# Patient Record
Sex: FEMALE | Race: WHITE | NOT HISPANIC OR LATINO | Employment: FULL TIME | URBAN - METROPOLITAN AREA
[De-identification: names, ages, dates, MRNs, and addresses within clinical notes are randomized per-mention and may not be internally consistent; named-entity substitution may affect disease eponyms.]

---

## 2020-08-11 ENCOUNTER — TRANSCRIBE ORDERS (OUTPATIENT)
Dept: ADMINISTRATIVE | Facility: HOSPITAL | Age: 35
End: 2020-08-11

## 2020-08-11 DIAGNOSIS — Z31.41 FERTILITY TESTING: Primary | ICD-10-CM

## 2021-01-27 ENCOUNTER — OFFICE VISIT (OUTPATIENT)
Dept: URGENT CARE | Facility: CLINIC | Age: 36
End: 2021-01-27
Payer: COMMERCIAL

## 2021-01-27 VITALS
OXYGEN SATURATION: 98 % | HEART RATE: 85 BPM | TEMPERATURE: 98 F | HEIGHT: 67 IN | BODY MASS INDEX: 33.59 KG/M2 | WEIGHT: 214 LBS

## 2021-01-27 DIAGNOSIS — R53.83 FATIGUE, UNSPECIFIED TYPE: Primary | ICD-10-CM

## 2021-01-27 DIAGNOSIS — Z20.822 CLOSE EXPOSURE TO COVID-19 VIRUS: ICD-10-CM

## 2021-01-27 PROCEDURE — U0003 INFECTIOUS AGENT DETECTION BY NUCLEIC ACID (DNA OR RNA); SEVERE ACUTE RESPIRATORY SYNDROME CORONAVIRUS 2 (SARS-COV-2) (CORONAVIRUS DISEASE [COVID-19]), AMPLIFIED PROBE TECHNIQUE, MAKING USE OF HIGH THROUGHPUT TECHNOLOGIES AS DESCRIBED BY CMS-2020-01-R: HCPCS | Performed by: PHYSICIAN ASSISTANT

## 2021-01-27 PROCEDURE — G0382 LEV 3 HOSP TYPE B ED VISIT: HCPCS | Performed by: PHYSICIAN ASSISTANT

## 2021-01-27 PROCEDURE — U0005 INFEC AGEN DETEC AMPLI PROBE: HCPCS | Performed by: PHYSICIAN ASSISTANT

## 2021-01-27 RX ORDER — DIPHENHYDRAMINE HYDROCHLORIDE 25 MG/1
25 CAPSULE ORAL DAILY
COMMUNITY

## 2021-01-27 RX ORDER — SERTRALINE HYDROCHLORIDE 100 MG/1
125 TABLET, FILM COATED ORAL DAILY
COMMUNITY

## 2021-01-27 RX ORDER — ASPIRIN 81 MG/1
162 TABLET, CHEWABLE ORAL DAILY
COMMUNITY
End: 2021-05-07 | Stop reason: HOSPADM

## 2021-01-27 RX ORDER — OMEGA-3S/DHA/EPA/FISH OIL/D3 300MG-1000
CAPSULE ORAL DAILY
COMMUNITY

## 2021-01-27 NOTE — PATIENT INSTRUCTIONS

## 2021-01-27 NOTE — PROGRESS NOTES
[email or web address]        NAME: Lupe Louis is a 37 y o  female  : 1985    MRN: [id number]  DATE: 2021  TIME: 1:28 PM    Assessment and Plan   Fatigue, unspecified type [R53 83]  1  Fatigue, unspecified type  Novel Coronavirus (Covid-19),PCR [facility] - Office Collection   2  Close exposure to COVID-19 virus  Novel Coronavirus (Covid-19),PCR [facility] - Office Collection         Patient Instructions       Follow up with PCP in 3-5 days  Proceed to  ER if symptoms worsen  Chief Complaint     Chief Complaint   Patient presents with    Fatigue    COVID-19      tested postive 2 days ago          History of Present Illness       Patient presents with fatigue, however, she is pregnant  Her  tested positive for COVID-19 and she is here for testing  Review of Systems   Review of Systems   Constitutional: Positive for fatigue  Negative for chills and fever  Respiratory: Positive for cough  Negative for shortness of breath  Gastrointestinal: Negative for diarrhea, nausea and vomiting  Musculoskeletal: Negative for myalgias  Neurological: Positive for headaches  Negative for dizziness  Current Medications       Current Outpatient Medications:     aspirin 81 mg chewable tablet, Chew 162 mg daily, Disp: , Rfl:     cholecalciferol (VITAMIN D3) 400 units tablet, Take by mouth daily, Disp: , Rfl:     sertraline (ZOLOFT) 100 mg tablet, Take 100 mg by mouth daily, Disp: , Rfl:     Pyridoxine HCl (vitamin B-6) 25 MG tablet, Take 25 mg by mouth daily, Disp: , Rfl:     Current Allergies     Allergies as of 2021    (No Known Allergies)            The following portions of the patient's history were reviewed and updated as appropriate: allergies, current medications, past family history, past medical history, past social history, past surgical history and problem list      History reviewed  No pertinent past medical history  History reviewed   No pertinent surgical history  No family history on file  Medications have been verified  Objective   Pulse 85   Temp 98 °F (36 7 °C)   Ht 5' 7" (1 702 m)   Wt 97 1 kg (214 lb)   SpO2 98%   BMI 33 52 kg/m²   No LMP recorded  Patient is pregnant  Physical Exam     Physical Exam  Vitals signs and nursing note reviewed  Constitutional:       Appearance: Normal appearance  HENT:      Head: Normocephalic and atraumatic  Cardiovascular:      Rate and Rhythm: Normal rate and regular rhythm  Heart sounds: Normal heart sounds  Pulmonary:      Effort: Pulmonary effort is normal       Breath sounds: Normal breath sounds  Skin:     General: Skin is warm  Neurological:      Mental Status: She is alert

## 2021-01-28 LAB — SARS-COV-2 RNA RESP QL NAA+PROBE: POSITIVE

## 2021-02-05 ENCOUNTER — TELEMEDICINE (OUTPATIENT)
Dept: PERINATAL CARE | Facility: OTHER | Age: 36
End: 2021-02-05
Payer: COMMERCIAL

## 2021-02-05 ENCOUNTER — TRANSCRIBE ORDERS (OUTPATIENT)
Dept: PERINATAL CARE | Facility: CLINIC | Age: 36
End: 2021-02-05

## 2021-02-05 DIAGNOSIS — O98.512 COVID-19 AFFECTING PREGNANCY IN SECOND TRIMESTER: Primary | ICD-10-CM

## 2021-02-05 DIAGNOSIS — Z3A.26 26 WEEKS GESTATION OF PREGNANCY: ICD-10-CM

## 2021-02-05 DIAGNOSIS — O09.899 SUPERVISION OF OTHER HIGH RISK PREGNANCIES, UNSPECIFIED TRIMESTER: ICD-10-CM

## 2021-02-05 DIAGNOSIS — O09.899 SUPERVISION OF OTHER HIGH RISK PREGNANCIES, UNSPECIFIED TRIMESTER: Primary | ICD-10-CM

## 2021-02-05 DIAGNOSIS — U07.1 COVID-19 AFFECTING PREGNANCY IN SECOND TRIMESTER: Primary | ICD-10-CM

## 2021-02-05 PROCEDURE — 99204 OFFICE O/P NEW MOD 45 MIN: CPT | Performed by: OBSTETRICS & GYNECOLOGY

## 2021-02-05 NOTE — PROGRESS NOTES
Virtual Regular Visit      Assessment/Plan:    Problem List Items Addressed This Visit        Other    COVID-19 affecting pregnancy in second trimester - Primary     COVID-19 in Pregnancy Telemedicine Consult:    Current gestational age: 34w1d    Narrative of symptoms and time course:  Note her father currently intubated in ICU in hospital in [address] - offered emotional support at this difficult time   developed anosmia; she called her OB and was sent for testing; her test collected 21 resulted as positive   developed a wet cough (foamy) and fevers (3 days), maintained SaO2, he is now fever free x2 days, feels much better  Alexandra: no fevers (occ "hot flashes" but no fevers), exhaustion, chest congestion; no mucus, some discomfort on deep inspiration, better though with Vicks vaporub, occasionally takes Tylenol for pain  Poor appetite, not thirsty  It is hard to tell when symptoms developed, probably around 21  Any symptoms today warranting ED-referral? no    Checklist for discontinuation of transmission-based precautions:  1  Has it been 10 days since symptoms first appeared?  no  2  Has it been at least 24h since last fever (off antipyretics)? No fevers  3  Are symptoms improving? yes    Therefore, tentative date of end of quarantine/isolation: 10 days from 21 is 21 return to in-person care  Need to reschedule any ultrasounds?  unclear; I do not have OB records to review; however would advise a third trimester growth assessment  General information on SARS-CoV-2 infection in pregnancy reviewed:  1  The virus is novel and clinical course in pregnancy outcomes are incompletely known  2  There are no known associated fetal malformations; detailed evaluation of fetal anatomy is recommended if not already done  3  Recent SHANTELL publications () suggest no increased risk of  birth or stillbirth    There is however theoretical increased risk of higher rates of low birth weight,  delivery, and  intensive care unit (NICU) admission  4  Third trimester fetal growth assessment is advised  5  The majority of mothers infected experience a mild clinical course  Symptoms often include cough, fever, congestion, ageusia, anosmia, diarrhea, congestion, rhinorrhea and in severe cases may also include pneumonia and respiratory failure  There is potential for worsening symptoms on days 5-10 of infection, and chest pain and/or dyspnea in particular are concerning for worsening disease  6  Recent MMWR data (20) demonstrates an increased risk of severe illness (ICU admission, invasive ventilation, ECMO, and death)  7  Vertical transmission of COVID-19 may be possible, but appears infrequent   infection of the  is also possible  We reviewed option for [facility] ASPIRE registry and she was referred to website: [email or web address]  [facility] edu/    Any further COVID-related MFM followup needed? no       At the conclusion of today's encounter, all questions were answered to her satisfaction  The total time spent on this new patient on the encounter date was 47 minutes  This time included previsit service time of  5 minutes, face-to-face service time of  37 minutes, and post-service time of 5 minutes  This time included preparing to see the patient, obtaining and/or reviewing separately obtained history from her, performing a medically appropriate examination and/or evaluation, counseling and educating her; documenting clinical information in the electronic or other health record  Thank you very much for this kind referral and please do not hesitate to contact me with any further questions or concerns                       Other Visit Diagnoses     Supervision of other high risk pregnancies, unspecified trimester        26 weeks gestation of pregnancy               Reason for visit is   Chief Complaint   Patient presents with   [employer] Virtual Regular Visit        Encounter provider Efren Rodas MD    Provider located at [address] [address]      Recent Visits  No visits were found meeting these conditions  Showing recent visits within past 7 days and meeting all other requirements     Today's Visits  Date Type Provider Dept   21 Telemedicine Efren Rodas MD Dorothy Langston   Showing today's visits and meeting all other requirements     Future Appointments  No visits were found meeting these conditions  Showing future appointments within next 150 days and meeting all other requirements        The patient was identified by name and date of birth  Shola Hernandez was informed that this is a telemedicine visit and that the visit is being conducted through [employer] and patient was informed that this is a secure, HIPAA-compliant platform  She agrees to proceed     My office door was closed  No one else was in the room  She acknowledged consent and understanding of privacy and security of the video platform  The patient has agreed to participate and understands they can discontinue the visit at any time  Patient is aware this is a billable service  Subjective  Shola Hernandez is a 37 y o  female  [facility] patient who is currently COVID-positive  HPI     History reviewed  No pertinent past medical history  Past Surgical History:   Procedure Laterality Date    WISDOM TOOTH EXTRACTION         Current Outpatient Medications   Medication Sig Dispense Refill    aspirin 81 mg chewable tablet Chew 162 mg daily      cholecalciferol (VITAMIN D3) 400 units tablet Take by mouth daily      Pyridoxine HCl (vitamin B-6) 25 MG tablet Take 25 mg by mouth daily      sertraline (ZOLOFT) 100 mg tablet Take 125 mg by mouth daily        No current facility-administered medications for this visit           No Known Allergies    Historical Information   OB History    Para Term  AB Living   1             SAB TAB Ectopic Multiple Live Births                  # Outcome Date GA Lbr Jerman/2nd Weight Sex Delivery Anes PTL Lv   1 Current              Gynecologic history: No LMP recorded  Patient is pregnant  Past Medical History Pertinent Negatives:   Diagnosis Date Noted    Deep vein thrombosis ([facility] ) 2021    Gestational diabetes 2021    Hypertension 2021    Pulmonary embolism ([facility] ) 2021      Past Surgical History Pertinent Negatives:   Procedure Date Noted     SECTION 2021      Social History   Social History     Tobacco Use    Smoking status: Never Smoker    Smokeless tobacco: Never Used   Substance Use Topics    Alcohol use: Not Currently    Drug use: Never      Occupation: [employer]  (work from home)  Spouse/Partner Name: Ashleigh Ramsey; Age 29  Family History   Problem Relation Age of Onset    Hypertension Mother        Review of Systems   Constitutional: Positive for appetite change and fatigue  Negative for chills and fever  HENT: Negative for congestion  Respiratory: Negative for cough  Cardiovascular: Negative for chest pain, palpitations and leg swelling  Genitourinary: Negative for vaginal bleeding  No LOF, no contractions; normal fetal movement  Expecting a boy  Musculoskeletal:        Having hard time comfortably wearing her bra also sleeping on left side; had a sharp left sided pain earlier today which resolved  Video Exam    Home SaO2: 96%, Pulse 103  Physical Exam  Constitutional:       General: She is not in acute distress  Appearance: Normal appearance  She is not ill-appearing  HENT:      Head: Normocephalic  Nose: No congestion  Eyes:      Extraocular Movements: Extraocular movements intact        Comments: Bilateral proptosis vs exophthalmos   Pulmonary:      Effort: Pulmonary effort is normal  No respiratory distress  Abdominal:      Comments: Gravid abdomen   Neurological:      Mental Status: She is alert and oriented to person, place, and time  VIRTUAL VISIT DISCLAIMER    Helen Terri acknowledges that she has consented to an online visit or consultation  She understands that the online visit is based solely on information provided by her, and that, in the absence of a face-to-face physical evaluation by the physician, the diagnosis she receives is both limited and provisional in terms of accuracy and completeness  This is not intended to replace a full medical face-to-face evaluation by the physician  Helen Terri understands and accepts these terms

## 2021-02-05 NOTE — LETTER
February 5, 2021     Prabhakar Cardenas DO  [address] [facility] [place]    Patient: Kajal Turner   YOB: 1985   Date of Visit: 2/5/2021       Dear Dr Yuliya Mei: Thank you for referring Kajal Turner to me for evaluation  Below are my notes for this consultation  We did a consult for her COVID  Would advise TFTs (her eyes appear proptotic) though she denies any history of thyroid conditions  If you have questions, please do not hesitate to call me  Sincerely,        Tim Burns MD        CC: No Recipients  Tim Burns MD  2/5/2021  6:15 PM  Sign when Signing Visit    Virtual Regular Visit      Assessment/Plan:    Problem List Items Addressed This Visit        Other    COVID-19 affecting pregnancy in second trimester - Primary     COVID-19 in Pregnancy Telemedicine Consult:    Current gestational age: 34w1d    Narrative of symptoms and time course:  Note her father currently intubated in ICU in hospital in [address] [place] [address] - offered emotional support at this difficult time   developed anosmia; she called her OB and was sent for testing; her test collected 1/27/21 resulted as positive   developed a wet cough (foamy) and fevers (3 days), maintained SaO2, he is now fever free x2 days, feels much better  Alexandra: no fevers (occ "hot flashes" but no fevers), exhaustion, chest congestion; no mucus, some discomfort on deep inspiration, better though with Vicks vaporub, occasionally takes Tylenol for pain  Poor appetite, not thirsty  It is hard to tell when symptoms developed, probably around 2/1/21  Any symptoms today warranting ED-referral? no    Checklist for discontinuation of transmission-based precautions:  1  Has it been 10 days since symptoms first appeared?  no  2  Has it been at least 24h since last fever (off antipyretics)? No fevers  3  Are symptoms improving?      yes    Therefore, tentative date of end of quarantine/isolation: 10 days from 21 is 21 return to in-person care  Need to reschedule any ultrasounds?  unclear; I do not have OB records to review; however would advise a third trimester growth assessment  General information on SARS-CoV-2 infection in pregnancy reviewed:  1  The virus is novel and clinical course in pregnancy outcomes are incompletely known  2  There are no known associated fetal malformations; detailed evaluation of fetal anatomy is recommended if not already done  3  Recent SHANTELL publications () suggest no increased risk of  birth or stillbirth  There is however theoretical increased risk of higher rates of low birth weight,  delivery, and  intensive care unit (NICU) admission  4  Third trimester fetal growth assessment is advised  5  The majority of mothers infected experience a mild clinical course  Symptoms often include cough, fever, congestion, ageusia, anosmia, diarrhea, congestion, rhinorrhea and in severe cases may also include pneumonia and respiratory failure  There is potential for worsening symptoms on days 5-10 of infection, and chest pain and/or dyspnea in particular are concerning for worsening disease  6  Recent MMWR data (20) demonstrates an increased risk of severe illness (ICU admission, invasive ventilation, ECMO, and death)  7  Vertical transmission of COVID-19 may be possible, but appears infrequent   infection of the  is also possible  We reviewed option for [facility] ASPIRE registry and she was referred to website: [email or web address]  [facility] edu/    Any further COVID-related MFM followup needed? no       At the conclusion of today's encounter, all questions were answered to her satisfaction  The total time spent on this new patient on the encounter date was 47 minutes  This time included previsit service time of  5 minutes, face-to-face service time of  37 minutes, and post-service time of 5 minutes    This time included preparing to see the patient, obtaining and/or reviewing separately obtained history from her, performing a medically appropriate examination and/or evaluation, counseling and educating her; documenting clinical information in the electronic or other health record  Thank you very much for this kind referral and please do not hesitate to contact me with any further questions or concerns  Other Visit Diagnoses     Supervision of other high risk pregnancies, unspecified trimester        26 weeks gestation of pregnancy               Reason for visit is   Chief Complaint   Patient presents with    Virtual Regular Visit        Encounter provider Zina Wolfe MD    Provider located at [address] [id number]      Recent Visits  No visits were found meeting these conditions  Showing recent visits within past 7 days and meeting all other requirements     Today's Visits  Date Type Provider Dept   21 Telemedicine Zina Wolfe MD [facility]   Showing today's visits and meeting all other requirements     Future Appointments  No visits were found meeting these conditions  Showing future appointments within next 150 days and meeting all other requirements        The patient was identified by name and date of birth  Marjorie Awad was informed that this is a telemedicine visit and that the visit is being conducted through [employer] and patient was informed that this is a secure, HIPAA-compliant platform  She agrees to proceed     My office door was closed  No one else was in the room  She acknowledged consent and understanding of privacy and security of the video platform  The patient has agreed to participate and understands they can discontinue the visit at any time  Patient is aware this is a billable service       Subjective  Marjorie Awad is a 37 y o  female  [facility] patient who is currently COVID-positive  HPI     History reviewed  No pertinent past medical history  Past Surgical History:   Procedure Laterality Date    WISDOM TOOTH EXTRACTION         Current Outpatient Medications   Medication Sig Dispense Refill    aspirin 81 mg chewable tablet Chew 162 mg daily      cholecalciferol (VITAMIN D3) 400 units tablet Take by mouth daily      Pyridoxine HCl (vitamin B-6) 25 MG tablet Take 25 mg by mouth daily      sertraline (ZOLOFT) 100 mg tablet Take 125 mg by mouth daily        No current facility-administered medications for this visit  No Known Allergies    Historical Information   OB History    Para Term  AB Living   1             SAB TAB Ectopic Multiple Live Births                  # Outcome Date GA Lbr Jerman/2nd Weight Sex Delivery Anes PTL Lv   1 Current              Gynecologic history: No LMP recorded  Patient is pregnant  Past Medical History Pertinent Negatives:   Diagnosis Date Noted    Deep vein thrombosis ([facility] ) 2021    Gestational diabetes 2021    Hypertension 2021    Pulmonary embolism ([facility] ) 2021      Past Surgical History Pertinent Negatives:   Procedure Date Noted     SECTION 2021      Social History   Social History     Tobacco Use    Smoking status: Never Smoker    Smokeless tobacco: Never Used   Substance Use Topics    Alcohol use: Not Currently    Drug use: Never      Occupation: [employer]  (work from home)  Spouse/Partner Name: Genesis العلي; Age 29  Family History   Problem Relation Age of Onset    Hypertension Mother        Review of Systems   Constitutional: Positive for appetite change and fatigue  Negative for chills and fever  HENT: Negative for congestion  Respiratory: Negative for cough  Cardiovascular: Negative for chest pain, palpitations and leg swelling  Genitourinary: Negative for vaginal bleeding          No LOF, no contractions; normal fetal movement  Expecting a boy  Musculoskeletal:        Having hard time comfortably wearing her bra also sleeping on left side; had a sharp left sided pain earlier today which resolved  Video Exam    Home SaO2: 96%, Pulse 103  Physical Exam  Constitutional:       General: She is not in acute distress  Appearance: Normal appearance  She is not ill-appearing  HENT:      Head: Normocephalic  Nose: No congestion  Eyes:      Extraocular Movements: Extraocular movements intact  Comments: Bilateral proptosis vs exophthalmos   Pulmonary:      Effort: Pulmonary effort is normal  No respiratory distress  Abdominal:      Comments: Gravid abdomen   Neurological:      Mental Status: She is alert and oriented to person, place, and time  VIRTUAL VISIT DISCLAIMER    Bayronravin Boom acknowledges that she has consented to an online visit or consultation  She understands that the online visit is based solely on information provided by her, and that, in the absence of a face-to-face physical evaluation by the physician, the diagnosis she receives is both limited and provisional in terms of accuracy and completeness  This is not intended to replace a full medical face-to-face evaluation by the physician  Rashmi Nur understands and accepts these terms

## 2021-02-05 NOTE — ASSESSMENT & PLAN NOTE
Addended by: WANDA VAUGHN on: 6/22/2017 09:25 AM     Modules accepted: Orders     COVID-19 in Pregnancy Telemedicine Consult:    Current gestational age: 34w1d    Narrative of symptoms and time course:  Note her father currently intubated in ICU in hospital in [address] - offered emotional support at this difficult time   developed anosmia; she called her OB and was sent for testing; her test collected 21 resulted as positive   developed a wet cough (foamy) and fevers (3 days), maintained SaO2, he is now fever free x2 days, feels much better  Alexandra: no fevers (occ "hot flashes" but no fevers), exhaustion, chest congestion; no mucus, some discomfort on deep inspiration, better though with Vicks vaporub, occasionally takes Tylenol for pain  Poor appetite, not thirsty  It is hard to tell when symptoms developed, probably around 21  Any symptoms today warranting ED-referral? no    Checklist for discontinuation of transmission-based precautions:  1  Has it been 10 days since symptoms first appeared?  no  2  Has it been at least 24h since last fever (off antipyretics)? No fevers  3  Are symptoms improving? yes    Therefore, tentative date of end of quarantine/isolation: 10 days from 21 is 21 return to in-person care  Need to reschedule any ultrasounds?  unclear; I do not have OB records to review; however would advise a third trimester growth assessment  General information on SARS-CoV-2 infection in pregnancy reviewed:  1  The virus is novel and clinical course in pregnancy outcomes are incompletely known  2  There are no known associated fetal malformations; detailed evaluation of fetal anatomy is recommended if not already done  3  Recent SHANTELL publications (90) suggest no increased risk of  birth or stillbirth  There is however theoretical increased risk of higher rates of low birth weight,  delivery, and  intensive care unit (NICU) admission  4  Third trimester fetal growth assessment is advised  5   The majority of mothers infected experience a mild clinical course  Symptoms often include cough, fever, congestion, ageusia, anosmia, diarrhea, congestion, rhinorrhea and in severe cases may also include pneumonia and respiratory failure  There is potential for worsening symptoms on days 5-10 of infection, and chest pain and/or dyspnea in particular are concerning for worsening disease  6  Recent MMWR data (20) demonstrates an increased risk of severe illness (ICU admission, invasive ventilation, ECMO, and death)  7  Vertical transmission of COVID-19 may be possible, but appears infrequent   infection of the  is also possible  We reviewed option for [facility] ASPIRE registry and she was referred to website: [email or web address]  [facility]/    Any further COVID-related MFM followup needed? no       At the conclusion of today's encounter, all questions were answered to her satisfaction  The total time spent on this new patient on the encounter date was 47 minutes  This time included previsit service time of  5 minutes, face-to-face service time of  37 minutes, and post-service time of 5 minutes  This time included preparing to see the patient, obtaining and/or reviewing separately obtained history from her, performing a medically appropriate examination and/or evaluation, counseling and educating her; documenting clinical information in the electronic or other health record  Thank you very much for this kind referral and please do not hesitate to contact me with any further questions or concerns

## 2021-02-05 NOTE — PATIENT INSTRUCTIONS
From the CDC:  (http://Instant AV/  html#seek-medical-attention)  When to seek emergency medical attention  Look for emergency warning signs* for COVID-19  If someone is showing any of these signs, seek emergency medical care immediately:  · Trouble breathing  · Persistent pain or pressure in the chest  · New confusion  · Inability to wake or stay awake  · Bluish lips or face  *This list is not all possible symptoms  Please call your medical provider for any other symptoms that are severe or concerning to you  Call 911 or call ahead to your local emergency facility: Notify the  that you are seeking care for someone who has or may have COVID-19  Here is the page with comprehensive instructions on what to do if you are positive:  Search[facility]isoners de  html    There are additional resources from the [address] at  aspx      For other public health guidance, please review CDC guidance at www cdc gov:   1  Mask and quarantine now and follow specific CDC guidance  2  Consult CDC website and local health department regarding close contacts  3  Defer in-person non-urgent (routine) care until discontinuation of transmission based precautions  In interim, use telehealth (if possible) for non-urgent visits  a  Between now and discontinuation of transmission-based precautions, come masked to triage or ED if urgent/emergent, and call before coming if possible  4  If any chest pain, shortness of breath, difficulty breathing, or other serious medical concerns, go to the ED, masked  5  Notify your PCP for remainder of non-obstetric coronavirus specific issues  Here is the contact for the [address] [place] [address] Ave registry website: [email or web address]  [facility]/  Please consider enrolling

## 2021-03-10 ENCOUNTER — TELEPHONE (OUTPATIENT)
Dept: SURGICAL ONCOLOGY | Facility: CLINIC | Age: 36
End: 2021-03-10

## 2021-03-19 ENCOUNTER — CONSULT (OUTPATIENT)
Dept: SURGICAL ONCOLOGY | Facility: CLINIC | Age: 36
End: 2021-03-19
Payer: COMMERCIAL

## 2021-03-19 VITALS
HEIGHT: 67 IN | BODY MASS INDEX: 34.06 KG/M2 | SYSTOLIC BLOOD PRESSURE: 140 MMHG | RESPIRATION RATE: 16 BRPM | HEART RATE: 84 BPM | WEIGHT: 217 LBS | DIASTOLIC BLOOD PRESSURE: 80 MMHG | TEMPERATURE: 97.7 F

## 2021-03-19 DIAGNOSIS — R23.4 BREAST SKIN CHANGES: Primary | ICD-10-CM

## 2021-03-19 PROCEDURE — 99204 OFFICE O/P NEW MOD 45 MIN: CPT | Performed by: NURSE PRACTITIONER

## 2021-03-19 NOTE — PROGRESS NOTES
Surgical Oncology Follow Up       [facility]  [facility] [address]    Charlie Linda  1985  [id number]      Chief Complaint   Patient presents with    Consult       Assessment/Plan:  1  Breast skin changes  - Wear a supportive bra (sports bra) around the clock  - Follow up in 1-2 weeks for another clinical exam  - Call with worsening sympsoms    Discussion/Summary:   Patient is a 82-year-old female that presents today with concerns of bilateral breast skin changes  She is currently 32 weeks pregnant  She noticed these changes of approximately 1 month ago  On today's exam, she does have bilateral pendulous breasts with  skin thickening and mild breast edema primarily at the medial aspects of the breast   I do not appreciate any dominant masses or worrisome findings  I suspect the skin changes are secondary to edema related to hormonal changes of the breasts as well as the size of the breasts and lack of support  She states that she has not been wearing a supportive bra but did start wearing a new bra yesterday  I recommended wearing a supportive bra such as a sports bra around the clock  I will plan to see her back in 1-2 weeks to ensure improvement of her symptoms  I do not believe breast imaging or a punch biopsy is warranted at this time  I reviewed that the likelihood that we are dealing with a bilateral inflammatory malignancy is very low  Patient and her  are in agreement with this plan  All of her questions were answered today  History of Present Illness:     -Interval History:  Patient is a 82-year-old female that presents to the office today for consultation regarding bilateral breast skin changes  She is currently 32 weeks pregnant with her 1st pregnancy  She states that after becoming pregnant she had appreciated breast enlargement    She states that she wore a DD cup size prior to becoming pregnant and quickly needed triple D cup size  She now believes she is a F cup  She states that about 1 month ago she began to notice enlarged pores and skin thickening /swelling of her bilateral breasts, primarily in the medial aspects bilaterally  She does not appreciate any breast lumps  She also appreciates some mild pink discoloration which she needs to come and go  She has attributed this to the bra she was wearing  She states that yesterday she began wearing a different bra  Her gynecologist referred her to us for a clinical exam and recommendations  The patient has never had any breast imaging or biopsies  She has no personal or known family history of cancer  She states that her  paternal aunt had a questionable diagnosis which involved the axillary region but patient is unsure if this was malignant diagnosis  She is a nonsmoker and nondrinker  Menarche age 17, this is her 1st pregnancy  She has used birth control pills in the past and has never used hormone replacement therapy  Referring: Marty Hamman, DO    Review of Systems:  Review of Systems   Constitutional: Negative for activity change, appetite change, chills, fatigue, fever and unexpected weight change  Respiratory: Negative for cough and shortness of breath  Cardiovascular: Negative for chest pain  Gastrointestinal: Negative for abdominal pain, constipation, diarrhea, nausea and vomiting  Genitourinary:        32 weeks pregnant   Musculoskeletal: Negative for arthralgias, back pain, gait problem and myalgias  Skin: Negative for color change and rash  Neurological: Negative for dizziness and headaches  Hematological: Negative for adenopathy  Psychiatric/Behavioral: Negative for agitation and confusion  All other systems reviewed and are negative  Patient Active Problem List   Diagnosis    COVID-19 affecting pregnancy in second trimester    Breast skin changes     History reviewed   No pertinent past medical history    Past Surgical History:   Procedure Laterality Date    WISDOM TOOTH EXTRACTION       Family History   Problem Relation Age of Onset    Hypertension Mother      Social History     Socioeconomic History    Marital status: /Civil Union     Spouse name: Not on file    Number of children: Not on file    Years of education: Not on file    Highest education level: Not on file   Occupational History    Not on file   Social Needs    Financial resource strain: Not on file    Food insecurity     Worry: Not on file     Inability: Not on file   [employer] needs     Medical: Not on file     Non-medical: Not on file   Tobacco Use    Smoking status: Never Smoker    Smokeless tobacco: Never Used   Substance and Sexual Activity    Alcohol use: Not Currently    Drug use: Never    Sexual activity: Not on file   Lifestyle    Physical activity     Days per week: Not on file     Minutes per session: Not on file    Stress: Not on file   Relationships    Social connections     Talks on phone: Not on file     Gets together: Not on file     Attends Tenriism service: Not on file     Active member of club or organization: Not on file     Attends meetings of clubs or organizations: Not on file     Relationship status: Not on file    Intimate partner violence     Fear of current or ex partner: Not on file     Emotionally abused: Not on file     Physically abused: Not on file     Forced sexual activity: Not on file   Other Topics Concern    Not on file   Social History Narrative    Not on file       Current Outpatient Medications:     aspirin 81 mg chewable tablet, Chew 162 mg daily, Disp: , Rfl:     cholecalciferol (VITAMIN D3) 400 units tablet, Take by mouth daily, Disp: , Rfl:     Ferrous Sulfate (SLOW FE PO), Take by mouth, Disp: , Rfl:     Pyridoxine HCl (vitamin B-6) 25 MG tablet, Take 25 mg by mouth daily, Disp: , Rfl:     sertraline (ZOLOFT) 100 mg tablet, Take 125 mg by mouth daily , Disp: , Rfl:   No Known Allergies  Vitals:    03/19/21 0904   BP: 140/80   Pulse: 84   Resp: 16   Temp: 97 7 °F (36 5 °C)       Physical Exam  Constitutional:       Appearance: Normal appearance  HENT:      Head: Normocephalic and atraumatic  Pulmonary:      Effort: Pulmonary effort is normal    Chest:      Breasts:         Right: Swelling and skin change present  No bleeding, inverted nipple, mass, nipple discharge or tenderness  Left: Swelling and skin change present  No bleeding, inverted nipple, mass, nipple discharge or tenderness  Comments: Bilateral pendulous breasts with skin thickening/mild breast edema primarily at the medial aspect of each breast, left slightly greater than right  The pores are prominent on the medial aspects of the breasts  There is a very mild pink discoloration, likely vascular/edematous changes  No masses or suspicious lesions  Dense, nodular tissue noted bilaterally  Genitourinary:     Uterus: Enlarged (pregnant)  Musculoskeletal: Normal range of motion  Lymphadenopathy:      Upper Body:      Right upper body: No supraclavicular or axillary adenopathy  Left upper body: No supraclavicular or axillary adenopathy  Skin:     General: Skin is warm and dry  Neurological:      General: No focal deficit present  Mental Status: She is alert and oriented to person, place, and time  Psychiatric:         Mood and Affect: Mood normal            Advance Care Planning/Advance Directives:  Discussed disease status and treatment goals with the patient

## 2021-03-29 ENCOUNTER — OFFICE VISIT (OUTPATIENT)
Dept: SURGICAL ONCOLOGY | Facility: CLINIC | Age: 36
End: 2021-03-29
Payer: COMMERCIAL

## 2021-03-29 VITALS
DIASTOLIC BLOOD PRESSURE: 80 MMHG | RESPIRATION RATE: 16 BRPM | HEIGHT: 67 IN | TEMPERATURE: 98.1 F | SYSTOLIC BLOOD PRESSURE: 126 MMHG | WEIGHT: 217 LBS | BODY MASS INDEX: 34.06 KG/M2 | HEART RATE: 82 BPM

## 2021-03-29 DIAGNOSIS — R23.4 BREAST SKIN CHANGES: Primary | ICD-10-CM

## 2021-03-29 PROCEDURE — 99213 OFFICE O/P EST LOW 20 MIN: CPT | Performed by: NURSE PRACTITIONER

## 2021-03-29 NOTE — PROGRESS NOTES
Surgical Oncology Follow Up       [facility]  [facility] [address]    William Padron  1985  [id number]      Chief Complaint   Patient presents with    Follow-up     Pt is here for 2 week f/u        Assessment/Plan:  1  Breast skin changes  - Continue to wear a supportive bra and call with worsening symptoms  - 4 week f/u visit    Discussion/Summary:   Patient is a 22-year-old female that presents today for a follow-up visit for bilateral breast skin changes  She is currently 34 weeks pregnant  I had recommended wearing a supportive sports bra and patient feels this has helped with some of the breast edema  On today's exam, she continues to have breast edema, left greater than right  This does not appear suspicious and there are no masses noted on her exam  Again, the breast edema is likely related to hormonal changes and the size of the breasts  Patient also examined by Dr Carlyn Montilla today  I am recommending another short term f/u and instructed her to continue to wear a supportive bra  I instructed her to call with any changes on self exam  She and her  are in agreement with this plan  All of their questions were answered today  History of Present Illness:     -Interval History:  Patient has been wearing a sports bra and feels that some of the edema has improved  She notices no new breast lumps or worsening skin changes  She is now 34 weeks pregnant  Review of Systems:  Review of Systems   Constitutional: Negative for chills and fever  Respiratory: Negative for cough and shortness of breath  Cardiovascular: Negative for chest pain  Skin: Negative for color change and wound  Hematological: Negative for adenopathy  Patient Active Problem List   Diagnosis    COVID-19 affecting pregnancy in second trimester    Breast skin changes     History reviewed  No pertinent past medical history    Past Surgical History:   Procedure Laterality Date    WISDOM TOOTH EXTRACTION       Family History   Problem Relation Age of Onset    Hypertension Mother      Social History     Socioeconomic History    Marital status: /Civil Union     Spouse name: Not on file    Number of children: Not on file    Years of education: Not on file    Highest education level: Not on file   Occupational History    Not on file   Social Needs    Financial resource strain: Not on file    Food insecurity     Worry: Not on file     Inability: Not on file   [employer] needs     Medical: Not on file     Non-medical: Not on file   Tobacco Use    Smoking status: Never Smoker    Smokeless tobacco: Never Used   Substance and Sexual Activity    Alcohol use: Not Currently    Drug use: Never    Sexual activity: Not on file   Lifestyle    Physical activity     Days per week: Not on file     Minutes per session: Not on file    Stress: Not on file   Relationships    Social connections     Talks on phone: Not on file     Gets together: Not on file     Attends Christianity service: Not on file     Active member of club or organization: Not on file     Attends meetings of clubs or organizations: Not on file     Relationship status: Not on file    Intimate partner violence     Fear of current or ex partner: Not on file     Emotionally abused: Not on file     Physically abused: Not on file     Forced sexual activity: Not on file   Other Topics Concern    Not on file   Social History Narrative    Not on file       Current Outpatient Medications:     aspirin 81 mg chewable tablet, Chew 162 mg daily, Disp: , Rfl:     cholecalciferol (VITAMIN D3) 400 units tablet, Take by mouth daily, Disp: , Rfl:     Ferrous Sulfate (SLOW FE PO), Take by mouth, Disp: , Rfl:     Pyridoxine HCl (vitamin B-6) 25 MG tablet, Take 25 mg by mouth daily, Disp: , Rfl:     sertraline (ZOLOFT) 100 mg tablet, Take 125 mg by mouth daily , Disp: , Rfl:   No Known Allergies  Vitals:    03/29/21 0839   BP: 126/80   Pulse: 82   Resp: 16   Temp: 98 1 °F (36 7 °C)       Physical Exam  Constitutional:       Appearance: Normal appearance  HENT:      Head: Normocephalic and atraumatic  Pulmonary:      Effort: Pulmonary effort is normal    Chest:      Breasts:         Right: Swelling and skin change present  No bleeding, inverted nipple, mass, nipple discharge or tenderness  Left: Swelling (L>R) and skin change present  No bleeding, inverted nipple, mass, nipple discharge or tenderness  Comments: Dense, nodular tissue noted bilaterally  There is persistent bilateral skin thickening/edema, primarily of the left medial aspect without erythema, masses  Lymphadenopathy:      Upper Body:      Right upper body: No supraclavicular or axillary adenopathy  Left upper body: No supraclavicular or axillary adenopathy  Skin:     General: Skin is warm  Findings: No bruising, erythema or lesion  Neurological:      General: No focal deficit present  Mental Status: She is alert and oriented to person, place, and time  Psychiatric:         Mood and Affect: Mood normal            Advance Care Planning/Advance Directives:  Discussed disease status and treatment goals with the patient

## 2021-04-21 ENCOUNTER — LAB REQUISITION (OUTPATIENT)
Dept: LAB | Facility: HOSPITAL | Age: 36
End: 2021-04-21
Payer: COMMERCIAL

## 2021-04-21 DIAGNOSIS — O09.93 SUPERVISION OF HIGH RISK PREGNANCY, UNSPECIFIED, THIRD TRIMESTER: ICD-10-CM

## 2021-04-21 PROCEDURE — 87150 DNA/RNA AMPLIFIED PROBE: CPT | Performed by: OBSTETRICS & GYNECOLOGY

## 2021-04-23 LAB — GP B STREP DNA SPEC QL NAA+PROBE: NEGATIVE

## 2021-04-29 ENCOUNTER — TELEPHONE (OUTPATIENT)
Dept: SURGICAL ONCOLOGY | Facility: CLINIC | Age: 36
End: 2021-04-29

## 2021-04-29 NOTE — TELEPHONE ENCOUNTER
Pt called to reschedule appointment with Raymond Orozco due to her pregnancy term being close to its end  She scheduled out for November, and will call if she has any issues in the interim

## 2021-05-05 ENCOUNTER — HOSPITAL ENCOUNTER (INPATIENT)
Facility: HOSPITAL | Age: 36
LOS: 2 days | Discharge: HOME | End: 2021-05-07
Attending: OBSTETRICS & GYNECOLOGY | Admitting: OBSTETRICS & GYNECOLOGY
Payer: COMMERCIAL

## 2021-05-05 PROBLEM — Z3A.39 39 WEEKS GESTATION OF PREGNANCY: Status: ACTIVE | Noted: 2021-05-05

## 2021-05-05 PROBLEM — O99.210 MATERNAL OBESITY AFFECTING PREGNANCY, ANTEPARTUM: Status: ACTIVE | Noted: 2021-05-05

## 2021-05-05 LAB
ABO GROUP BLD: NORMAL
BASE EXCESS BLDCOA CALC-SCNC: -7 MMOL/L (ref 3–11)
BASE EXCESS BLDCOV CALC-SCNC: -5.3 MMOL/L (ref 1–9)
BLD GP AB SCN SERPL QL: NEGATIVE
ERYTHROCYTE [DISTWIDTH] IN BLOOD BY AUTOMATED COUNT: 13.1 % (ref 11.6–15.1)
HCO3 BLDCOA-SCNC: 22.7 MMOL/L (ref 17.3–27.3)
HCO3 BLDCOV-SCNC: 19.5 MMOL/L (ref 12.2–28.6)
HCT VFR BLD AUTO: 36.4 % (ref 34.8–46.1)
HGB BLD-MCNC: 11.9 G/DL (ref 11.5–15.4)
MCH RBC QN AUTO: 30.9 PG (ref 26.8–34.3)
MCHC RBC AUTO-ENTMCNC: 32.7 G/DL (ref 31.4–37.4)
MCV RBC AUTO: 95 FL (ref 82–98)
O2 CT VFR BLDCOA CALC: 10.7 ML/DL
OXYHGB MFR BLDCOA: 46.5 %
OXYHGB MFR BLDCOV: 67.3 %
PCO2 BLDCOA: 63 MM[HG] (ref 30–60)
PCO2 BLDCOV: 36 MM HG (ref 27–43)
PH BLDCOA: 7.17 [PH] (ref 7.23–7.43)
PH BLDCOV: 7.35 [PH] (ref 7.19–7.49)
PLATELET # BLD AUTO: 311 THOUSANDS/UL (ref 149–390)
PMV BLD AUTO: 10.8 FL (ref 8.9–12.7)
PO2 BLDCOA: 24.7 MM HG (ref 5–25)
PO2 BLDCOV: 27.3 MM HG (ref 15–45)
RBC # BLD AUTO: 3.85 MILLION/UL (ref 3.81–5.12)
RH BLD: POSITIVE
SAO2 % BLDCOV: 15.1 ML/DL
SPECIMEN EXPIRATION DATE: NORMAL
WBC # BLD AUTO: 10.98 THOUSAND/UL (ref 4.31–10.16)

## 2021-05-05 PROCEDURE — 85027 COMPLETE CBC AUTOMATED: CPT | Performed by: OBSTETRICS & GYNECOLOGY

## 2021-05-05 PROCEDURE — 82805 BLOOD GASES W/O2 SATURATION: CPT | Performed by: OBSTETRICS & GYNECOLOGY

## 2021-05-05 PROCEDURE — NC001 PR NO CHARGE: Performed by: OBSTETRICS & GYNECOLOGY

## 2021-05-05 PROCEDURE — 4A1HXCZ MONITORING OF PRODUCTS OF CONCEPTION, CARDIAC RATE, EXTERNAL APPROACH: ICD-10-PCS | Performed by: OBSTETRICS & GYNECOLOGY

## 2021-05-05 PROCEDURE — 86850 RBC ANTIBODY SCREEN: CPT | Performed by: OBSTETRICS & GYNECOLOGY

## 2021-05-05 PROCEDURE — 86901 BLOOD TYPING SEROLOGIC RH(D): CPT | Performed by: OBSTETRICS & GYNECOLOGY

## 2021-05-05 PROCEDURE — 86900 BLOOD TYPING SEROLOGIC ABO: CPT | Performed by: OBSTETRICS & GYNECOLOGY

## 2021-05-05 PROCEDURE — 86592 SYPHILIS TEST NON-TREP QUAL: CPT | Performed by: OBSTETRICS & GYNECOLOGY

## 2021-05-05 PROCEDURE — 10907ZC DRAINAGE OF AMNIOTIC FLUID, THERAPEUTIC FROM PRODUCTS OF CONCEPTION, VIA NATURAL OR ARTIFICIAL OPENING: ICD-10-PCS | Performed by: OBSTETRICS & GYNECOLOGY

## 2021-05-05 PROCEDURE — 0KQM0ZZ REPAIR PERINEUM MUSCLE, OPEN APPROACH: ICD-10-PCS | Performed by: OBSTETRICS & GYNECOLOGY

## 2021-05-05 PROCEDURE — 99203 OFFICE O/P NEW LOW 30 MIN: CPT

## 2021-05-05 RX ORDER — DOCUSATE SODIUM 100 MG/1
100 CAPSULE, LIQUID FILLED ORAL 2 TIMES DAILY
Status: DISCONTINUED | OUTPATIENT
Start: 2021-05-06 | End: 2021-05-07 | Stop reason: HOSPADM

## 2021-05-05 RX ORDER — DIAPER,BRIEF,INFANT-TODD,DISP
1 EACH MISCELLANEOUS 4 TIMES DAILY PRN
Status: DISCONTINUED | OUTPATIENT
Start: 2021-05-05 | End: 2021-05-07 | Stop reason: HOSPADM

## 2021-05-05 RX ORDER — OXYTOCIN/RINGER'S LACTATE 30/500 ML
1-30 PLASTIC BAG, INJECTION (ML) INTRAVENOUS
Status: DISCONTINUED | OUTPATIENT
Start: 2021-05-05 | End: 2021-05-05

## 2021-05-05 RX ORDER — ACETAMINOPHEN 325 MG/1
650 TABLET ORAL EVERY 6 HOURS PRN
Status: DISCONTINUED | OUTPATIENT
Start: 2021-05-05 | End: 2021-05-06 | Stop reason: SDUPTHER

## 2021-05-05 RX ORDER — BUTORPHANOL TARTRATE 1 MG/ML
1 INJECTION, SOLUTION INTRAMUSCULAR; INTRAVENOUS
Status: DISCONTINUED | OUTPATIENT
Start: 2021-05-05 | End: 2021-05-06

## 2021-05-05 RX ORDER — METHYLERGONOVINE MALEATE 0.2 MG/ML
INJECTION INTRAVENOUS
Status: DISCONTINUED
Start: 2021-05-05 | End: 2021-05-05 | Stop reason: WASHOUT

## 2021-05-05 RX ORDER — CARBOPROST TROMETHAMINE 250 UG/ML
INJECTION, SOLUTION INTRAMUSCULAR
Status: DISCONTINUED
Start: 2021-05-05 | End: 2021-05-05 | Stop reason: WASHOUT

## 2021-05-05 RX ORDER — IBUPROFEN 600 MG/1
600 TABLET ORAL EVERY 6 HOURS PRN
Status: DISCONTINUED | OUTPATIENT
Start: 2021-05-05 | End: 2021-05-07 | Stop reason: HOSPADM

## 2021-05-05 RX ORDER — ROPIVACAINE HYDROCHLORIDE 2 MG/ML
INJECTION, SOLUTION EPIDURAL; INFILTRATION; PERINEURAL
Status: DISPENSED
Start: 2021-05-05 | End: 2021-05-06

## 2021-05-05 RX ORDER — SODIUM CHLORIDE, SODIUM LACTATE, POTASSIUM CHLORIDE, CALCIUM CHLORIDE 600; 310; 30; 20 MG/100ML; MG/100ML; MG/100ML; MG/100ML
125 INJECTION, SOLUTION INTRAVENOUS CONTINUOUS
Status: DISCONTINUED | OUTPATIENT
Start: 2021-05-05 | End: 2021-05-05

## 2021-05-05 RX ORDER — BUPIVACAINE HYDROCHLORIDE 2.5 MG/ML
INJECTION, SOLUTION EPIDURAL; INFILTRATION; INTRACAUDAL
Status: COMPLETED
Start: 2021-05-05 | End: 2021-05-05

## 2021-05-05 RX ORDER — DIPHENHYDRAMINE HCL 25 MG
25 TABLET ORAL EVERY 6 HOURS PRN
Status: DISCONTINUED | OUTPATIENT
Start: 2021-05-05 | End: 2021-05-07 | Stop reason: HOSPADM

## 2021-05-05 RX ORDER — ACETAMINOPHEN 325 MG/1
650 TABLET ORAL EVERY 4 HOURS PRN
Status: DISCONTINUED | OUTPATIENT
Start: 2021-05-05 | End: 2021-05-07 | Stop reason: HOSPADM

## 2021-05-05 RX ORDER — ONDANSETRON 2 MG/ML
4 INJECTION INTRAMUSCULAR; INTRAVENOUS EVERY 6 HOURS PRN
Status: DISCONTINUED | OUTPATIENT
Start: 2021-05-05 | End: 2021-05-05

## 2021-05-05 RX ORDER — ONDANSETRON 2 MG/ML
4 INJECTION INTRAMUSCULAR; INTRAVENOUS EVERY 8 HOURS PRN
Status: DISCONTINUED | OUTPATIENT
Start: 2021-05-05 | End: 2021-05-07 | Stop reason: HOSPADM

## 2021-05-05 RX ORDER — CALCIUM CARBONATE 200(500)MG
1000 TABLET,CHEWABLE ORAL DAILY PRN
Status: DISCONTINUED | OUTPATIENT
Start: 2021-05-05 | End: 2021-05-07 | Stop reason: HOSPADM

## 2021-05-05 RX ADMIN — Medication 2 MILLI-UNITS/MIN: at 15:34

## 2021-05-05 RX ADMIN — WITCH HAZEL 1 PAD: 500 SOLUTION RECTAL; TOPICAL at 23:10

## 2021-05-05 RX ADMIN — HYDROCORTISONE 1 APPLICATION: 1 CREAM TOPICAL at 23:10

## 2021-05-05 RX ADMIN — BUPIVACAINE HYDROCHLORIDE 30 ML: 2.5 INJECTION, SOLUTION EPIDURAL; INFILTRATION; INTRACAUDAL; PERINEURAL at 21:14

## 2021-05-05 RX ADMIN — ACETAMINOPHEN 650 MG: 325 TABLET ORAL at 15:47

## 2021-05-05 RX ADMIN — BENZOCAINE AND LEVOMENTHOL: 200; 5 SPRAY TOPICAL at 23:10

## 2021-05-05 RX ADMIN — Medication 62.5 MILLI-UNITS/MIN: at 21:42

## 2021-05-05 RX ADMIN — SODIUM CHLORIDE, SODIUM LACTATE, POTASSIUM CHLORIDE, AND CALCIUM CHLORIDE 125 ML/HR: .6; .31; .03; .02 INJECTION, SOLUTION INTRAVENOUS at 15:30

## 2021-05-05 RX ADMIN — SERTRALINE HYDROCHLORIDE 125 MG: 100 TABLET ORAL at 23:09

## 2021-05-05 RX ADMIN — IBUPROFEN 600 MG: 600 TABLET ORAL at 23:55

## 2021-05-05 RX ADMIN — SODIUM CHLORIDE, SODIUM LACTATE, POTASSIUM CHLORIDE, AND CALCIUM CHLORIDE 999 ML/HR: .6; .31; .03; .02 INJECTION, SOLUTION INTRAVENOUS at 19:58

## 2021-05-05 RX ADMIN — BUTORPHANOL TARTRATE 1 MG: 1 INJECTION, SOLUTION INTRAMUSCULAR; INTRAVENOUS at 21:38

## 2021-05-05 NOTE — OB LABOR/OXYTOCIN SAFETY PROGRESS
Oxytocin Safety Progress Check Note - Lena Polk 31 y o  female MRN: [id number]    Unit/Bed#: L&D 324-01 Encounter: [id number]    Dose (poncho-units/min) Oxytocin: 6 poncho-units/min  Contraction Frequency (minutes): 1 5-3  Contraction Quality: Moderate  Tachysystole: No   Cervical Dilation: 5        Cervical Effacement: 70  Fetal Station: -2  Baseline Rate: 140 bpm     FHR Category: Category I               Vital Signs:   Vitals:    05/05/21 1905   BP: 118/63   Pulse: 71   Resp: 18   Temp: 98 7 °F (37 1 °C)   SpO2: 97%           Notes/comments:      Cervix 5/80/-2, AROM'd for large amount of clear fluids  FHT Category I  Patient requesting epidural  Will continue zaire Paiz MD 5/5/2021 7:34 PM

## 2021-05-05 NOTE — PLAN OF CARE
Problem: PAIN - ADULT  Goal: Verbalizes/displays adequate comfort level or baseline comfort level  Description: Interventions:  - Encourage patient to monitor pain and request assistance  - Assess pain using appropriate pain scale  - Administer analgesics based on type and severity of pain and evaluate response  - Implement non-pharmacological measures as appropriate and evaluate response  - Consider cultural and social influences on pain and pain management  - Notify physician/advanced practitioner if interventions unsuccessful or patient reports new pain  Outcome: Progressing     Problem: INFECTION - ADULT  Goal: Absence or prevention of progression during hospitalization  Description: INTERVENTIONS:  - Assess and monitor for signs and symptoms of infection  - Monitor lab/diagnostic results  - Monitor all insertion sites, i e  indwelling lines, tubes, and drains  - Monitor endotracheal if appropriate and nasal secretions for changes in amount and color  - [place] appropriate cooling/warming therapies per order  - Administer medications as ordered  - Instruct and encourage patient and family to use good hand hygiene technique  - Identify and instruct in appropriate isolation precautions for identified infection/condition  Outcome: Progressing  Goal: Absence of fever/infection during neutropenic period  Description: INTERVENTIONS:  - Monitor WBC    Outcome: Progressing     Problem: SAFETY ADULT  Goal: Patient will remain free of falls  Description: INTERVENTIONS:  - Assess patient frequently for physical needs  -  Identify cognitive and physical deficits and behaviors that affect risk of falls    -  [place] fall precautions as indicated by assessment   - Educate patient/family on patient safety including physical limitations  - Instruct patient to call for assistance with activity based on assessment  - Modify environment to reduce risk of injury  - Consider OT/PT consult to assist with strengthening/mobility  Outcome: Progressing  Goal: Maintain or return to baseline ADL function  Description: INTERVENTIONS:  -  Assess patient's ability to carry out ADLs; assess patient's baseline for ADL function and identify physical deficits which impact ability to perform ADLs (bathing, care of mouth/teeth, toileting, grooming, dressing, etc )  - Assess/evaluate cause of self-care deficits   - Assess range of motion  - Assess patient's mobility; develop plan if impaired  - Assess patient's need for assistive devices and provide as appropriate  - Encourage maximum independence but intervene and supervise when necessary  - Involve family in performance of ADLs  - Assess for home care needs following discharge   - Consider OT consult to assist with ADL evaluation and planning for discharge  - Provide patient education as appropriate  Outcome: Progressing  Goal: Maintain or return mobility status to optimal level  Description: INTERVENTIONS:  - Assess patient's baseline mobility status (ambulation, transfers, stairs, etc )    - Identify cognitive and physical deficits and behaviors that affect mobility  - Identify mobility aids required to assist with transfers and/or ambulation (gait belt, sit-to-stand, lift, walker, cane, etc )  - [place] fall precautions as indicated by assessment  - Record patient progress and toleration of activity level on Mobility SBAR; progress patient to next Phase/Stage  - Instruct patient to call for assistance with activity based on assessment  - Consider rehabilitation consult to assist with strengthening/weightbearing, etc   Outcome: Progressing     Problem: Knowledge Deficit  Goal: Patient/family/caregiver demonstrates understanding of disease process, treatment plan, medications, and discharge instructions  Description: Complete learning assessment and assess knowledge base    Interventions:  - Provide teaching at level of understanding  - Provide teaching via preferred learning methods  Outcome: Progressing  Goal: Verbalizes understanding of labor plan  Description: Assess patient/family/caregiver's baseline knowledge level and ability to understand information  Provide education via patient/family/caregiver's preferred learning method at appropriate level of understanding  1  Provide teaching at level of understanding  2  Provide teaching via preferred learning method(s)  Outcome: Progressing     Problem: DISCHARGE PLANNING  Goal: Discharge to home or other facility with appropriate resources  Description: INTERVENTIONS:  - Identify barriers to discharge w/patient and caregiver  - Arrange for needed discharge resources and transportation as appropriate  - Identify discharge learning needs (meds, wound care, etc )  - Arrange for interpretive services to assist at discharge as needed  - Refer to Case Management Department for coordinating discharge planning if the patient needs post-hospital services based on physician/advanced practitioner order or complex needs related to functional status, cognitive ability, or social support system  Outcome: Progressing     Problem: Labor & Delivery  Goal: Manages discomfort  Description: Assess and monitor for signs and symptoms of discomfort  Assess patient's pain level regularly and per hospital policy  Administer medications as ordered  Support use of nonpharmacological methods to help control pain such as distraction, imagery, relaxation, and application of heat and cold  Collaborate with interdisciplinary team and patient to determine appropriate pain management plan  1  Include patient in decisions related to comfort  2  Offer non-pharmacological pain management interventions  3  Report ineffective pain management to physician  Outcome: Progressing  Goal: Patient vital signs are stable  Description: 1  Assess vital signs - vaginal delivery    Outcome: Progressing

## 2021-05-05 NOTE — H&P
H&P Exam - Obstetrics   Farrukh Kaiser 31 y o  female MRN: [id number]  Unit/Bed#: L&D 324-01 Encounter: [id number]    ASSESSMENT:  34yo  at 39w0d weeks gestation who is being admitted for early labor  EFW: 6 5VJM  Cephalic presentation confirmed by transabdominal ultrasound    PLAN:  1) Pregnancy at 39w0d  Admit  Follow up CBC, RPR, Blood Type  Start with Pitocin augmentation  GBS negtaive status  Analgesia and/or epidural at patient request  Anticipate     2) Anxiety/depression   Continue Zoloft 125mg daily    Plan of care discussed with Dr Gabriele Garza  This patient will be an INPATIENT  and I certify the anticipated length of stay is >2 Midnights  History of Present Illness     Chief Complaint: "I'm already 5cm dilated"    HPI:  Farrukh Kaiser is a 31 y o   female with an SHAWN of 2021, by Other Basis at 39w0d weeks gestation who presents for early labor  Patient was noted to be 5cm on SVE at [facility] today  Contractions: yes  Loss of fluid: no  Vaginal bleeding: no  Fetal movement: normal    ROS otherwise negative  She is a [facility] patient  PREGNANCY COMPLICATIONS:   1) Obesity BMI 33    OB History    Para Term  AB Living   1             SAB TAB Ectopic Multiple Live Births                  # Outcome Date GA Lbr Jerman/2nd Weight Sex Delivery Anes PTL Lv   1 Current              Review of Systems   Constitutional: Negative for chills and fever  HENT: Negative for ear pain and sore throat  Eyes: Negative for pain and visual disturbance  Respiratory: Negative for cough and shortness of breath  Cardiovascular: Negative for chest pain and palpitations  Gastrointestinal: Negative for abdominal pain and vomiting  Genitourinary: Negative for dysuria and hematuria  Musculoskeletal: Negative for arthralgias and back pain  Skin: Negative for color change and rash  Neurological: Negative for seizures and syncope     All other systems reviewed and are negative  Historical Information   Past Medical History:   Diagnosis Date    Anemia     Anxiety      Past Surgical History:   Procedure Laterality Date    WISDOM TOOTH EXTRACTION       Social History   Social History     Substance and Sexual Activity   Alcohol Use Not Currently     Social History     Substance and Sexual Activity   Drug Use Never     Social History     Tobacco Use   Smoking Status Never Smoker   Smokeless Tobacco Never Used     Family History: non-contributory    Meds/Allergies      Medications Prior to Admission   Medication    aspirin 81 mg chewable tablet    cholecalciferol (VITAMIN D3) 400 units tablet    Ferrous Sulfate (SLOW FE PO)    Pyridoxine HCl (vitamin B-6) 25 MG tablet    sertraline (ZOLOFT) 100 mg tablet      No Known Allergies    OBJECTIVE:    Vitals: Blood pressure 135/74, pulse 88, temperature 98 9 °F (37 2 °C), temperature source Oral, resp  rate 18, height 5' 7" (1 702 m), weight 98 4 kg (217 lb), SpO2 98 %  Body mass index is 33 99 kg/m²  Physical Exam  Vitals signs reviewed  Constitutional:       General: She is not in acute distress  Appearance: She is well-developed  She is not diaphoretic  HENT:      Head: Normocephalic and atraumatic  Cardiovascular:      Rate and Rhythm: Normal rate and regular rhythm  Heart sounds: Normal heart sounds  No murmur  No friction rub  No gallop  Pulmonary:      Effort: Pulmonary effort is normal  No respiratory distress  Breath sounds: Normal breath sounds  No wheezing or rales  Abdominal:      Palpations: Abdomen is soft  Tenderness: There is no abdominal tenderness  There is no guarding or rebound  Musculoskeletal: Normal range of motion  Skin:     General: Skin is warm and dry  Neurological:      Mental Status: She is alert and oriented to person, place, and time     Psychiatric:         Mood and Affect: Mood normal          Behavior: Behavior normal          Cervix: 5/70/-2     Fetal heart rate:   Baseline Rate: 135 bpm  Variability: Moderate 6-25 bpm  Accelerations: 15 x 15 or greater  Decelerations: None  Erin:   Contraction Frequency (minutes): 2-5  Contraction Duration (seconds):   Contraction Quality: Mild, Moderate  GBS: negative  Prenatal Labs: I have personally reviewed pertinent reports        Invasive Devices     None                     Angelica Greenberg MD  5/5/2021  2:48 PM

## 2021-05-06 LAB
ABO GROUP BLD: NORMAL
ERYTHROCYTE [DISTWIDTH] IN BLOOD BY AUTOMATED COUNT: 13.1 % (ref 11.6–15.1)
HCT VFR BLD AUTO: 28.3 % (ref 34.8–46.1)
HGB BLD-MCNC: 9.5 G/DL (ref 11.5–15.4)
MCH RBC QN AUTO: 31 PG (ref 26.8–34.3)
MCHC RBC AUTO-ENTMCNC: 33.6 G/DL (ref 31.4–37.4)
MCV RBC AUTO: 93 FL (ref 82–98)
PLATELET # BLD AUTO: 264 THOUSANDS/UL (ref 149–390)
PMV BLD AUTO: 10.2 FL (ref 8.9–12.7)
RBC # BLD AUTO: 3.06 MILLION/UL (ref 3.81–5.12)
RH BLD: POSITIVE
RPR SER QL: NORMAL
WBC # BLD AUTO: 15.17 THOUSAND/UL (ref 4.31–10.16)

## 2021-05-06 PROCEDURE — NC001 PR NO CHARGE: Performed by: OBSTETRICS & GYNECOLOGY

## 2021-05-06 PROCEDURE — 85027 COMPLETE CBC AUTOMATED: CPT | Performed by: OBSTETRICS & GYNECOLOGY

## 2021-05-06 RX ORDER — OXYTOCIN/RINGER'S LACTATE 30/500 ML
62.5 PLASTIC BAG, INJECTION (ML) INTRAVENOUS
Status: ACTIVE | OUTPATIENT
Start: 2021-05-06 | End: 2021-05-06

## 2021-05-06 RX ORDER — BUTORPHANOL TARTRATE 1 MG/ML
1 INJECTION, SOLUTION INTRAMUSCULAR; INTRAVENOUS
Status: DISCONTINUED | OUTPATIENT
Start: 2021-05-06 | End: 2021-05-07 | Stop reason: HOSPADM

## 2021-05-06 RX ADMIN — SERTRALINE HYDROCHLORIDE 125 MG: 100 TABLET ORAL at 08:36

## 2021-05-06 RX ADMIN — IBUPROFEN 600 MG: 600 TABLET ORAL at 06:13

## 2021-05-06 RX ADMIN — DOCUSATE SODIUM 100 MG: 100 CAPSULE ORAL at 18:36

## 2021-05-06 RX ADMIN — DOCUSATE SODIUM 100 MG: 100 CAPSULE ORAL at 08:36

## 2021-05-06 RX ADMIN — ACETAMINOPHEN 650 MG: 325 TABLET ORAL at 08:36

## 2021-05-06 RX ADMIN — IBUPROFEN 600 MG: 600 TABLET ORAL at 19:25

## 2021-05-06 RX ADMIN — ACETAMINOPHEN 650 MG: 325 TABLET ORAL at 02:08

## 2021-05-06 RX ADMIN — IBUPROFEN 600 MG: 600 TABLET ORAL at 13:20

## 2021-05-06 NOTE — PROGRESS NOTES
Progress Note - OB/GYN   Terry Seay 31 y o  female MRN: [id number]  Unit/Bed#: L&D 306-01 Encounter: [id number]    Terry Seay is a patient of [facility]    Subjective/Objective     Chief Complaint: Postpartum State        Subjective:   Patient is PPD# 1  Patient had a PPH at time of delivery  QBL was 1107cc due to deep sulcal  Lac  She received an extra bag of pitocin  Starting Hgb was 11 9 and dropped to 9 5  VSS  Bleeding is stable  She is recovering well and is stable  Pain: no  Tolerating Oral Intake: yes  Voiding: yes  Flatus: yes  Bowel Movement: no  Ambulating: yes  Breastfeeding: Breastfeeding  Chest Pain: no  Shortness of Breath: no  Leg Pain/Discomfort: no  Lochia: moderate    Objective:   Vitals:   /69 (BP Location: Left arm)   Pulse 86   Temp 98 2 °F (36 8 °C) (Oral)   Resp 18   Ht 5' 7" (1 702 m)   Wt 98 4 kg (217 lb)   SpO2 99%   Breastfeeding Yes   BMI 33 99 kg/m²   Body mass index is 33 99 kg/m²    I/O       05/04 0701 - 05/05 0700 05/05 0701 - 05/06 0700    I V  (mL/kg)  1785 1 (18 1)    Total Intake(mL/kg)  1785 1 (18 1)    Urine (mL/kg/hr)  375    Blood  1107    Total Output  1482    Net  +303 1          Unmeasured Urine Occurrence  2 x        Lab Results   Component Value Date    WBC 15 17 (H) 05/06/2021    HGB 9 5 (L) 05/06/2021    HCT 28 3 (L) 05/06/2021    MCV 93 05/06/2021     05/06/2021       Meds/Allergies   Current Facility-Administered Medications   Medication Dose Route Frequency    acetaminophen (TYLENOL) tablet 650 mg  650 mg Oral Q6H PRN    acetaminophen (TYLENOL) tablet 650 mg  650 mg Oral Q4H PRN    benzocaine-menthol-lanolin-aloe (DERMOPLAST) 20-0 5 % topical spray   Topical 4x Daily PRN    butorphanol (STADOL) injection 1 mg  1 mg Intravenous Q3H PRN    calcium carbonate (TUMS) chewable tablet 1,000 mg  1,000 mg Oral Daily PRN    diphenhydrAMINE (BENADRYL) tablet 25 mg  25 mg Oral Q6H PRN    docusate sodium (COLACE) capsule 100 mg  100 mg Oral BID  hydrocortisone 1 % cream 1 application  1 application Topical 4x Daily PRN    ibuprofen (MOTRIN) tablet 600 mg  600 mg Oral Q6H PRN    ondansetron (ZOFRAN) injection 4 mg  4 mg Intravenous Q8H PRN    oxytocin (PITOCIN) 30 Units in lactated ringers 500 mL infusion  62 5 poncho-units/min Intravenous Titrated    ROPivacaine (NAROPIN) 0 2 % epidural infusion **ADS Override Pull**        sertraline (ZOLOFT) tablet 125 mg  125 mg Oral Daily    witch hazel-glycerin (TUCKS) topical pad 1 pad  1 pad Topical Q2H PRN       Physical Exam:  General: in no apparent distress  Cardiovascular: Cor RRR  Lungs: clear to auscultation bilaterally  Abdomen: abdomen is soft without significant tenderness, masses, organomegaly or guarding  Fundus: Firm, 1 cm below the umbilicus  Lower extremeties: nontender    Assessment:  Post partum day #1 s/p , stable, and doing well    Plan:    PPD# 1  - Continue routine post partum care   - Encourage ambulation   - Encourage breastfeeding  - Continue current meds     Postpartum Hemorrhage    - QBL 1107, Hgb 11 9-->9 5   - Extra bag of pit    - Fundus is firm, bleeding is stable    - Continue to monitor bleeding     Acute Blood Loss Anemia    - Hgb 11 8--> 9 5   - VSS    - Asymptomatic      Anxiety/Depression    - Zoloft 125mg daily     Disposition    - Anticipate discharge home [address] Rufino Graves MD  OB/GYN PGY-1  2021   5:59 AM

## 2021-05-06 NOTE — PLAN OF CARE
Problem: PAIN - ADULT  Goal: Verbalizes/displays adequate comfort level or baseline comfort level  Description: Interventions:  - Encourage patient to monitor pain and request assistance  - Assess pain using appropriate pain scale  - Administer analgesics based on type and severity of pain and evaluate response  - Implement non-pharmacological measures as appropriate and evaluate response  - Consider cultural and social influences on pain and pain management  - Notify physician/advanced practitioner if interventions unsuccessful or patient reports new pain  Outcome: Progressing     Problem: INFECTION - ADULT  Goal: Absence or prevention of progression during hospitalization  Description: INTERVENTIONS:  - Assess and monitor for signs and symptoms of infection  - Monitor lab/diagnostic results  - Monitor all insertion sites, i e  indwelling lines, tubes, and drains  - Monitor endotracheal if appropriate and nasal secretions for changes in amount and color  - [place] appropriate cooling/warming therapies per order  - Administer medications as ordered  - Instruct and encourage patient and family to use good hand hygiene technique  - Identify and instruct in appropriate isolation precautions for identified infection/condition  Outcome: Progressing  Goal: Absence of fever/infection during neutropenic period  Description: INTERVENTIONS:  - Monitor WBC    Outcome: Progressing     Problem: SAFETY ADULT  Goal: Patient will remain free of falls  Description: INTERVENTIONS:  - Assess patient frequently for physical needs  -  Identify cognitive and physical deficits and behaviors that affect risk of falls    -  [place] fall precautions as indicated by assessment   - Educate patient/family on patient safety including physical limitations  - Instruct patient to call for assistance with activity based on assessment  - Modify environment to reduce risk of injury  - Consider OT/PT consult to assist with strengthening/mobility  Outcome: Progressing  Goal: Maintain or return to baseline ADL function  Description: INTERVENTIONS:  -  Assess patient's ability to carry out ADLs; assess patient's baseline for ADL function and identify physical deficits which impact ability to perform ADLs (bathing, care of mouth/teeth, toileting, grooming, dressing, etc )  - Assess/evaluate cause of self-care deficits   - Assess range of motion  - Assess patient's mobility; develop plan if impaired  - Assess patient's need for assistive devices and provide as appropriate  - Encourage maximum independence but intervene and supervise when necessary  - Involve family in performance of ADLs  - Assess for home care needs following discharge   - Consider OT consult to assist with ADL evaluation and planning for discharge  - Provide patient education as appropriate  Outcome: Progressing  Goal: Maintain or return mobility status to optimal level  Description: INTERVENTIONS:  - Assess patient's baseline mobility status (ambulation, transfers, stairs, etc )    - Identify cognitive and physical deficits and behaviors that affect mobility  - Identify mobility aids required to assist with transfers and/or ambulation (gait belt, sit-to-stand, lift, walker, cane, etc )  - [place] fall precautions as indicated by assessment  - Record patient progress and toleration of activity level on Mobility SBAR; progress patient to next Phase/Stage  - Instruct patient to call for assistance with activity based on assessment  - Consider rehabilitation consult to assist with strengthening/weightbearing, etc   Outcome: Progressing     Problem: DISCHARGE PLANNING  Goal: Discharge to home or other facility with appropriate resources  Description: INTERVENTIONS:  - Identify barriers to discharge w/patient and caregiver  - Arrange for needed discharge resources and transportation as appropriate  - Identify discharge learning needs (meds, wound care, etc )  - Arrange for interpretive services to assist at discharge as needed  - Refer to Case Management Department for coordinating discharge planning if the patient needs post-hospital services based on physician/advanced practitioner order or complex needs related to functional status, cognitive ability, or social support system  Outcome: Progressing     Problem: POSTPARTUM  Goal: Experiences normal postpartum course  Description: INTERVENTIONS:  - Monitor maternal vital signs  - Assess uterine involution and lochia  Outcome: Progressing  Goal: Appropriate maternal -  bonding  Description: INTERVENTIONS:  - Identify family support  - Assess for appropriate maternal/infant bonding   -Encourage maternal/infant bonding opportunities  - Referral to  or  as needed  Outcome: Progressing  Goal: Establishment of infant feeding pattern  Description: INTERVENTIONS:  - Assess breast/bottle feeding  - Refer to lactation as needed  Outcome: Progressing  Goal: Incision(s), wounds(s) or drain site(s) healing without S/S of infection  Description: INTERVENTIONS  - Assess and document risk factors for skin impairment   - Assess and document dressing, incision, wound bed, drain sites and surrounding tissue  - Consider nutrition services referral as needed  - Oral mucous membranes remain intact  - Provide patient/ family education  Outcome: Progressing     Problem: Potential for Falls  Goal: Patient will remain free of falls  Description: INTERVENTIONS:  - Assess patient frequently for physical needs  -  Identify cognitive and physical deficits and behaviors that affect risk of falls    -  [place] fall precautions as indicated by assessment   - Educate patient/family on patient safety including physical limitations  - Instruct patient to call for assistance with activity based on assessment  - Modify environment to reduce risk of injury  - Consider OT/PT consult to assist with strengthening/mobility  Outcome: Progressing

## 2021-05-06 NOTE — L&D DELIVERY NOTE
Vaginal Delivery Summary - OB/GYN   Maday Ramon 31 y o  female MRN: [id number]  Unit/Bed#: L&D 324-01 Encounter: [id number]          Predelivery Diagnosis:  1  Pregnancy at 39w1d  2  Anxiety/depression  3  Obesity BMI 33    Postdelivery Diagnosis:  1  Same as above  2  Delivery of term   3  Postpartum hemorrhage    Procedure: Spontaneous Vaginal Delivery, repair of left sulcal and second-degree perineal lacerations    Attending: Dr Karina Sterling    Assistant: Dr Eloisa Epps     Anesthesia: None     QBL: 1107 cc  Admission H 9  Admission platelets: [phone]    Complications: none apparent    Specimens: cord blood, arterial and venous cord blood gasses, placenta to storage    Findings:   1  Viable male at 2103, with APGARS of 8 and 9 at 1 and 5 minutes respectively,  2  Spontaneous delivery of intact placenta   3  Left sulcal, second degree perineal, and right labial lacerations    4  Blood gases:    Umbilical Cord Venous Blood Gas:  Results from last 7 days   Lab Units 21   PH COV  7 351   PCO2 COV mm HG 36 0   HCO3 COV mmol/L 19 5   BASE EXC COV mmol/L -5 3*   O2 CT CD VB mL/dL 15 1   O2 HGB, VENOUS CORD % 74 7     Umbilical Cord Arterial Blood Gas:  Results from last 7 days   Lab Units 21   PH COA  7 175*   PCO2 COA  63 0*   PO2 COA mm HG 24 7   HCO3 COA mmol/L 22 7   BASE EXC COA mmol/L -7 0*   O2 CONTENT CORD ART ml/dl 10 7   O2 HGB, ARTERIAL CORD % 46 5       Disposition:  Patient tolerated the procedure well and was recovering in labor and delivery room     Brief history and labor course:  Ms Maday Ramon is a 31 y o   at 40wk3d  She presented to labor and delivery in early labor with a cervical exam of 5/70/-2  She was started on a Pitocin titration for augmentation and was artificially ruptured for a large amount of clear fluids  She quickly progressed to complete cervical dilation prior to pushing      Description of procedure    After pushing for 16 minutes, at 2103 patient delivered a viable male , wt 7lbs 8 3oz, apgars of 8 (1 min) and 9 (5 min)  The fetal vertex delivered spontaneously  Baby was checked for nuchal, which was not present  The anterior shoulder delivered atraumatically with maternal expulsive forces and the assistance of downward traction  The posterior shoulder delivered with maternal expulsive forces and the assistance of upward traction  The remainder of the fetus delivered spontaneously  Upon delivery, the infant was placed on the mothers abdomen and the cord was clamped and cut  Delayed cord clamping was performed  The infant was noted to cry spontaneously and was moving all extremities appropriately  There was no evidence for injury  Awaiting nurse resuscitators evaluated the   Arterial and venous cord blood gases and cord blood was collected for analysis  These were promptly sent to the lab  In the immediate post-partum, 30 units of IV pitocin was administered, and the uterus was noted to contract down well with massage and pitocin  The placenta delivered spontaneously at 2110 and was noted to have a centrally inserted 3 vessel cord  The vagina, cervix, perineum, and rectum were inspected and there was noted to be a deep left sulcal laceration  Marcaine was injected for local anesthetic  Several medium-sized clots were evacuated from the uterus following a bimanual exam  A Abel retractor was used for better visualization of the apex and the laceration was then repaired with a running lock suture using 3-0 Vicryl  There was also noted to be a small second degree laceration that was repaired in typical fashion using 3-0 Vicryl  A right labial laceration was noted and not repaired since hemostatic  Following repair, all sites were hemostatic and the uterus was noted to be firm  At the conclusion of the procedure, all needle, sponge, and instrument counts were noted to be correct   Patient tolerated the procedure well and was allowed to recover in labor and delivery room with family and  before being transferred to the post-partum floor  Dr Smith Abreu was present and participated in all key portions of the case          Jenna Payne MD  2021  10:19 PM

## 2021-05-06 NOTE — PLAN OF CARE
Problem: PAIN - ADULT  Goal: Verbalizes/displays adequate comfort level or baseline comfort level  Description: Interventions:  - Encourage patient to monitor pain and request assistance  - Assess pain using appropriate pain scale  - Administer analgesics based on type and severity of pain and evaluate response  - Implement non-pharmacological measures as appropriate and evaluate response  - Consider cultural and social influences on pain and pain management  - Notify physician/advanced practitioner if interventions unsuccessful or patient reports new pain  Outcome: Progressing     Problem: INFECTION - ADULT  Goal: Absence or prevention of progression during hospitalization  Description: INTERVENTIONS:  - Assess and monitor for signs and symptoms of infection  - Monitor lab/diagnostic results  - Monitor all insertion sites, i e  indwelling lines, tubes, and drains  - Monitor endotracheal if appropriate and nasal secretions for changes in amount and color  - [place] appropriate cooling/warming therapies per order  - Administer medications as ordered  - Instruct and encourage patient and family to use good hand hygiene technique  - Identify and instruct in appropriate isolation precautions for identified infection/condition  Outcome: Progressing  Goal: Absence of fever/infection during neutropenic period  Description: INTERVENTIONS:  - Monitor WBC    Outcome: Progressing     Problem: SAFETY ADULT  Goal: Patient will remain free of falls  Description: INTERVENTIONS:  - Assess patient frequently for physical needs  -  Identify cognitive and physical deficits and behaviors that affect risk of falls    -  [place] fall precautions as indicated by assessment   - Educate patient/family on patient safety including physical limitations  - Instruct patient to call for assistance with activity based on assessment  - Modify environment to reduce risk of injury  - Consider OT/PT consult to assist with strengthening/mobility  Outcome: Progressing  Goal: Maintain or return to baseline ADL function  Description: INTERVENTIONS:  -  Assess patient's ability to carry out ADLs; assess patient's baseline for ADL function and identify physical deficits which impact ability to perform ADLs (bathing, care of mouth/teeth, toileting, grooming, dressing, etc )  - Assess/evaluate cause of self-care deficits   - Assess range of motion  - Assess patient's mobility; develop plan if impaired  - Assess patient's need for assistive devices and provide as appropriate  - Encourage maximum independence but intervene and supervise when necessary  - Involve family in performance of ADLs  - Assess for home care needs following discharge   - Consider OT consult to assist with ADL evaluation and planning for discharge  - Provide patient education as appropriate  Outcome: Progressing  Goal: Maintain or return mobility status to optimal level  Description: INTERVENTIONS:  - Assess patient's baseline mobility status (ambulation, transfers, stairs, etc )    - Identify cognitive and physical deficits and behaviors that affect mobility  - Identify mobility aids required to assist with transfers and/or ambulation (gait belt, sit-to-stand, lift, walker, cane, etc )  - [place] fall precautions as indicated by assessment  - Record patient progress and toleration of activity level on Mobility SBAR; progress patient to next Phase/Stage  - Instruct patient to call for assistance with activity based on assessment  - Consider rehabilitation consult to assist with strengthening/weightbearing, etc   Outcome: Progressing     Problem: DISCHARGE PLANNING  Goal: Discharge to home or other facility with appropriate resources  Description: INTERVENTIONS:  - Identify barriers to discharge w/patient and caregiver  - Arrange for needed discharge resources and transportation as appropriate  - Identify discharge learning needs (meds, wound care, etc )  - Arrange for interpretive services to assist at discharge as needed  - Refer to Case Management Department for coordinating discharge planning if the patient needs post-hospital services based on physician/advanced practitioner order or complex needs related to functional status, cognitive ability, or social support system  Outcome: Progressing     Problem: POSTPARTUM  Goal: Experiences normal postpartum course  Description: INTERVENTIONS:  - Monitor maternal vital signs  - Assess uterine involution and lochia  Outcome: Progressing  Goal: Appropriate maternal -  bonding  Description: INTERVENTIONS:  - Identify family support  - Assess for appropriate maternal/infant bonding   -Encourage maternal/infant bonding opportunities  - Referral to  or  as needed  Outcome: Progressing  Goal: Establishment of infant feeding pattern  Description: INTERVENTIONS:  - Assess breast/bottle feeding  - Refer to lactation as needed  Outcome: Progressing  Goal: Incision(s), wounds(s) or drain site(s) healing without S/S of infection  Description: INTERVENTIONS  - Assess and document risk factors for skin impairment   - Assess and document dressing, incision, wound bed, drain sites and surrounding tissue  - Consider nutrition services referral as needed  - Oral mucous membranes remain intact  - Provide patient/ family education  Outcome: Progressing

## 2021-05-06 NOTE — LACTATION NOTE
This note was copied from a baby's chart  Assisted mom with breastfeeding  Baby sleepy  I demo  ways to awaken him  I demo  football hold, how to hand express and how to get a deep latch  Baby would not wake to latch  Mom hand expressed some colostrum as we attempted  Mom had many questions  She was debating about just pumping and bottle feeding, but thinks she may breastfeed for 3 weeks before starting that   Enc to call for asst as needed,phone # given

## 2021-05-06 NOTE — LACTATION NOTE
This note was copied from a baby's chart  Met with mother  Provided mother with Ready, Set, Baby booklet  Discussed Skin to Skin contact an benefits to mom and baby  Talked about the delay of the first bath until baby has adjusted  Spoke about the benefits of rooming in  Feeding on cue and what that means for recognizing infant's hunger  Avoidance of pacifiers for the first month discussed  Talked about exclusive breastfeeding for the first 6 months  Positioning and latch reviewed as well as showing images of other feeding positions  Discussed the properties of a good latch in any position  Reviewed hand/manual expression  Discussed s/s that baby is getting enough milk and some s/s that breastfeeding dyad may need further help  Gave information on common concerns, what to expect the first few weeks after delivery, preparing for other caregivers, and how partners can help  Resources for support also provided  Mother verbalized breastfeeding is going well  But she also feels like the baby is not hungry  She verb everyone keeps telling her to feed baby, but he's not hungry  I reassured he may not be too hungry the first 24-48 hours, but I enc her to try to feed baby if it gets to be 3 hours and he hasn't shown any feeding cues yet  Enc to call for assistance as needed,phone # given

## 2021-05-06 NOTE — DISCHARGE SUMMARY
Discharge Summary - OB/GYN   Farrukh Kaiser 31 y o  female MRN: [id number]  Unit/Bed#: L&D 324-01 Encounter: [id number]      Admission Date: 2021     Discharge Date: 2021    Admitting Diagnosis:   1  Pregnancy at 39w1d  2  Anxiety/depression  3  Obesity BMI 33    Discharge Diagnosis:   Same, delivered  Postpartum hemorrhage    Procedures: spontaneous vaginal delivery    Admitting attending:  Dr Fallon Elise Attending: Tory Suazo DO   Discharge attending:  Dr Orlando Kelly Course:   Ms Farrukh Kaiser is a 31 y o   at 40wk3d  She presented to labor and delivery in early labor with a cervical exam of /-2  She was started on a Pitocin titration for augmentation and was artificially ruptured for a large amount of clear fluids  She quickly progressed to complete cervical dilation prior to pushing  She delivered a viable male  on 21 at 2103  Weight 7lbs 8 3oz via spontaneous vaginal delivery, complicated by a postpartum hemorrhage of 1 1L  Apgars were 8 (1 min) and 9 (5 min)   stayed in the delivery room after birth  Patient tolerated the procedure well and was transferred to recovery in stable condition  Her post-partum course was uncomplicated  Her post-partum pain was well controlled with oral analgesics  On day of discharge, she was ambulating and able to reasonably perform all ADLs  She was voiding and had appropriate bowel function  Pain was well controlled  She was discharged home on post-partum day #2 without complications  Patient was instructed to follow up with her OB as an outpatient and was given appropriate warnings to call provider if she develops signs of infection or uncontrolled pain  Complications: none apparent    Condition at discharge: good     Discharge instructions/Information to patient and family:   See after visit summary for information provided to patient and family        Provisions for Follow-Up Care:  See after visit summary for information related to follow-up care and any pertinent home health orders  Disposition: Home    Planned Readmission: No    Discharge Medications: For a complete list of the patient's medications, please refer to her med rec  I have participated in the care of this patient during this hospitalization and agree with the discharge summary      Mendez Wolf DO  5/7/2021  8:39 AM

## 2021-05-07 VITALS
RESPIRATION RATE: 18 BRPM | TEMPERATURE: 98 F | WEIGHT: 217 LBS | DIASTOLIC BLOOD PRESSURE: 60 MMHG | SYSTOLIC BLOOD PRESSURE: 92 MMHG | OXYGEN SATURATION: 97 % | HEART RATE: 76 BPM | HEIGHT: 67 IN | BODY MASS INDEX: 34.06 KG/M2

## 2021-05-07 PROCEDURE — NC001 PR NO CHARGE: Performed by: OBSTETRICS & GYNECOLOGY

## 2021-05-07 RX ORDER — DIAPER,BRIEF,INFANT-TODD,DISP
1 EACH MISCELLANEOUS 4 TIMES DAILY PRN
Qty: 30 G | Refills: 0
Start: 2021-05-07

## 2021-05-07 RX ORDER — ACETAMINOPHEN 325 MG/1
650 TABLET ORAL EVERY 4 HOURS PRN
Refills: 0
Start: 2021-05-07

## 2021-05-07 RX ORDER — DOCUSATE SODIUM 100 MG/1
100 CAPSULE, LIQUID FILLED ORAL 2 TIMES DAILY
Qty: 10 CAPSULE | Refills: 0 | Status: SHIPPED | OUTPATIENT
Start: 2021-05-07

## 2021-05-07 RX ORDER — IBUPROFEN 600 MG/1
600 TABLET ORAL EVERY 6 HOURS PRN
Qty: 30 TABLET | Refills: 0 | Status: SHIPPED | OUTPATIENT
Start: 2021-05-07

## 2021-05-07 RX ADMIN — ACETAMINOPHEN 650 MG: 325 TABLET ORAL at 02:12

## 2021-05-07 RX ADMIN — DOCUSATE SODIUM 100 MG: 100 CAPSULE ORAL at 08:34

## 2021-05-07 RX ADMIN — IBUPROFEN 600 MG: 600 TABLET ORAL at 14:10

## 2021-05-07 RX ADMIN — SERTRALINE HYDROCHLORIDE 125 MG: 100 TABLET ORAL at 08:34

## 2021-05-07 RX ADMIN — IBUPROFEN 600 MG: 600 TABLET ORAL at 06:13

## 2021-05-07 RX ADMIN — BENZOCAINE AND LEVOMENTHOL: 200; 5 SPRAY TOPICAL at 14:10

## 2021-05-07 NOTE — DISCHARGE INSTRUCTIONS
Self Care After Delivery   AMBULATORY CARE:   The postpartum period  is the period of time from delivery to about 6 weeks  During this time you may experience many physical and emotional changes  It is important to understand what is normal and when you need to call your healthcare provider  It is also important to know how to care for yourself during this time  Call your local emergency number (911 in the [address] [facility]) for any of the following:   · You see or hear things that are not there, or have thoughts of harming yourself or your baby  · You soak through 1 pad in 15 minutes, have blurry vision, clammy or pale skin, and feel faint  · You faint or lose consciousness  · You have trouble breathing  · You cough up blood  · Your  incision comes apart  Seek care immediately if:   · Your heart is beating faster than usual     · You have a bad headache or changes in your vision  · Your episiotomy or  incision is red, swollen, bleeding, or draining pus  · You have severe abdominal pain  Call your doctor or obstetrician if:   · Your leg is painful, red, and larger than usual     · You soak through 1 or more pads in an hour, or pass blood clots larger than a quarter from your vagina  · You have a fever  · You have new or worsening pain in your abdomen or vagina  · You continue to have depression 1 to 2 weeks after you deliver  · You have trouble sleeping  · You have foul-smelling discharge from your vagina  · You have pain or burning when you urinate  · You do not have a bowel movement for 3 days or more  · You have nausea or are vomiting  · You have hard lumps or red streaks over your breasts  · You have cracked nipples or bleed from your nipples  · You have questions or concerns about your condition or care  Physical changes:   The following are normal changes after you give birth:  · Pain in the area between your anus and vagina    · Breast pain    · Constipation or hemorrhoids    · Hot or cold flashes    · Vaginal bleeding or discharge    · Mild to moderate abdominal cramping    · Difficulty controlling bowel movements or urine    Emotional changes:  A drop in hormone levels after you deliver may cause changes in your emotions  You may feel irritable, sad, or anxious  You may cry easily or for no reason  You may also feel depressed  Depression that continues can be a sign of postpartum depression, a condition that can be treated  Treatment may include talk therapy, medicines, or both  Healthcare providers will ask how you are feeling and if you have any depression  These talks can happen during appointments for your medical care and for your baby's care, such as well child visits  Providers can help you find ways to care for yourself and your baby  Talk to your providers about the following:  · When emotional changes or depression started, and if it is getting worse over time    · Problems you are having with daily activities, sleep, or caring for your baby    · If anything makes you feel worse, or makes you feel better    · Feeling that you are not bonding with your baby the way you want    · Any problems your baby has with sleeping or feeding    · Your baby is fussy or cries a lot    · Support you have from friends, family, or others    Breast care for breastfeeding mothers: You may have sore breasts for 3 to 6 days after you give birth  This happens as your milk begins to fill your breasts  You may also have sore breasts if you do not breastfeed frequently  Do the following to care for your breasts:  · Apply a moist, warm, compress to your breast as directed  This may help soothe your breasts  Make sure the washcloth is not too hot before you apply it to your breast     · Nurse your baby or pump your milk frequently  This may prevent clogged milk ducts  Ask your healthcare provider how often to nurse or pump      · Massage your breasts as directed  This may help increase your milk flow  Gently rub your breasts in a circular motion before you breastfeed  You may need to gently squeeze your breast or nipple to help release milk  You can also use a breast pump to help release milk from your breast     · Wash your breasts with warm water only  Do not put soap on your nipples  Soap may cause your nipples to become dry  · Apply lanolin cream to your nipples as directed  Lanolin cream may add moisture to your skin and prevent nipple dryness  Always  wash off lanolin cream with warm water before you breastfeed  · Place pads in your bra  Your nipples may leak milk when you are not breastfeeding  You can place pads inside of your bra to help prevent leaking onto your clothing  Ask your healthcare provider where to purchase bra pads  · Get breastfeeding support if needed  Healthcare providers can answer questions about breastfeeding and provide you with support  Ask your healthcare provider who you can contact if you need breastfeeding support  Breast care for non-breastfeeding mothers:  Milk will fill your breasts even if you bottle feed your baby  Do the following to help stop your milk from filling your breasts and causing pain:  · Wear a bra with support at all times  A sports bra or a tight-fitting bra will help stop your milk from coming in  · Apply ice on each breast for 15 to 20 minutes every hour or as directed  Use an ice pack, or put crushed ice in a plastic bag  Cover it with a towel before you apply it to your breast  Ice helps your milk ducts shrink  · Keep your breasts away from warm water  Warm water will make it easier for milk to fill your breasts  Stand with your breasts away from warm water in the shower  · Limit how much you touch your breasts  This will prevent them from filling with milk  Perineum care: Your perineum is the area between your rectum and vagina   It is normal to have swelling and pain in this area after you give birth  If you had an episiotomy, your healthcare provider may give you special instructions  · Clean your perineum after you use the bathroom  This may prevent infection and help with healing  Use a spray bottle with warm water to clean your perineum  You may also gently spray warm water against your perineum when you urinate  Always wipe front to back  · Take a sitz bath as directed  A sitz bath may help relieve swelling and pain  Fill your bath tub or bucket with water up to your hips and sit in the water  Use cold water for 2 days after you deliver  Then use warm water  Ask your healthcare provider for more information about a sitz bath  · Apply ice packs for the first 24 hours or as directed  Use a plastic glove filled with ice or buy an ice pack  Wrap the ice pack or plastic glove in a small towel or wash cloth  Place the ice pack on your perineum for 20 minutes at a time  · Sit on a donut-shaped pillow  This may relieve pressure on your perineum when you sit  · Use wipes that contain medicine or take pills as directed  Your healthcare provider may tell you to use witch hazel pads  You can place witch hazel pads in the refrigerator before you apply them to your perineum  Your provider may also tell you to take NSAIDs  Ask him or her how often to take pills or use the wipes  · Do not go swimming or take tub baths for 4 to 6 weeks or as directed  This will help prevent an infection in your vagina or uterus  Bowel and bladder care: It may take 3 to 5 days to have a bowel movement after you deliver your baby  You can do the following to prevent or manage constipation, and get control of your bowel or bladder:  · Take stool softeners as directed  A stool softener is medicine that will make your bowel movements softer  This may prevent or relieve constipation  A stool softener may also make bowel movements less painful  · Drink plenty of liquids    Ask how much liquid to drink each day and which liquids are best for you  Liquids may help prevent constipation  · Eat foods high in fiber  Examples include fruits, vegetables, grains, beans, and lentils  Ask your healthcare provider how much fiber you need each day  Fiber may prevent constipation  · Do Kegel exercises as directed  Kegel exercises will help strengthen the muscles that control bowel movements and urination  Ask your healthcare provider for more information on Kegel exercises  · Apply cold compresses or medicine to hemorrhoids as directed  This may relieve swelling and pain  Your healthcare provider may tell you to apply ice or wipes that contain medicine to your hemorrhoids  He or she may also tell you to use a sitz bath  Ask your provider for more information on how to manage hemorrhoids  Nutrition:  Good nutrition is important in the postpartum period  It will help you return to a healthy weight, increase your energy levels, and prevent constipation  It will also help you get enough nutrients and calories if you are going to breastfeed your baby  · Eat a variety of healthy foods  Healthy foods include fruits, vegetables, whole-grain breads, low-fat dairy products, beans, lean meats, and fish  You may need 500 to 700 extra calories each day if you breastfeed your baby  You may also need extra protein  · Limit foods with added sugar and high amounts of fat  These foods are high in calories and low in healthy nutrients  Read food labels so you know how much sugar and fat is in the food you want to eat  · Drink 8 to 10 glasses of water per day  Water will help you make plenty of milk for your baby  It will also help prevent constipation  Drink a glass of water every time you breastfeed your baby  · Take vitamins as directed  Ask your healthcare provider what vitamins you need  · Limit caffeine and alcohol if you are breastfeeding    Caffeine and alcohol can get into your breast milk  Caffeine and alcohol can make your baby fussy  They can also interfere with your baby's sleep  Ask your healthcare provider if you can drink alcohol or caffeine  Rest and sleep: You may feel very tired in the postpartum period  Enough sleep will help you heal and give you energy to care for your baby  The following may help you get sleep and rest:  · Nap when your baby naps  Your baby may nap several times during the day  Get rest during this time  · Limit visitors  Many people may want to see you and your baby  Ask friends or family to visit on different days  This will give you time to rest     · Do not plan too much for one day  Put off household chores so that you have time to rest  Gradually do more each day  · Ask for help from family, friends, or neighbors  Ask them to help you with laundry, cleaning, or errands  Also ask someone to watch the baby while you take a nap or relax  Ask your partner to help with the care of your baby  Pump some of your breast milk so your partner can feed your baby during the night  Exercise after delivery:  Wait until your healthcare provider says it is okay to exercise  Exercise can help you lose weight, increase your energy levels, and manage your mood  It can also prevent constipation and blood clots  Start with gentle exercises such as walking  Do more as you have more energy  You may need to avoid abdominal exercises for 1 to 2 weeks after you deliver  Talk to your healthcare provider about an exercise plan that is right for you  Sexual activity after delivery:   · Do not have sex until your healthcare provider says it is okay  You may need to wait 4 to 6 weeks before you have sex  This may prevent infection and allow time to heal     · Your menstrual cycle may begin as soon as 3 weeks after you deliver  Your period may be delayed if you breastfeed your baby  You can become pregnant before you get your first postpartum period   Talk to your healthcare provider about birth control that is right for you  Some types of birth control are not safe during breastfeeding  For support and more information:  Join a support group for new mothers  Ask for help from family and friends with chores, errands, and care of your baby  · Office of Women's Health,  Department of Health and Human Services  [address], [address] [place] [place]  [place] , [address]  [address], [address] [facility] [place]  [place] , [address]  Phone: [phone]  Web Address: www womenshealth gov  · March of [facility] Postpartum [address] [facility] , [address]  [address] [facility] , [address]  Web Address: [email or web address]  Follow up with your doctor or obstetrician as directed: You will need to follow up within 2 to 6 weeks of delivery  Write down your questions so you remember to ask them at your visits  © Copyright [facility] [address] Information is for End User's use only and may not be sold, redistributed or otherwise used for commercial purposes  All illustrations and images included in CareNotes® are the copyrighted property of A D A M , Inc  or [address] [place] [address] [facility]  The above information is an  only  It is not intended as medical advice for individual conditions or treatments  Talk to your doctor, nurse or pharmacist before following any medical regimen to see if it is safe and effective for you  COVID-19 (Coronavirus Disease 2019)   WHAT YOU NEED TO KNOW:   What do I need to know about coronavirus disease 2019 (COVID-19)? COVID-19 is the disease caused by the novel (new) coronavirus first discovered in December 2019  Coronaviruses generally cause upper respiratory (nose, throat, and lung) infections, such as a cold  The new virus can also cause serious lower respiratory conditions, such as pneumonia or acute respiratory distress syndrome (ARDS)   Anyone can develop serious problems from the new virus, but your risk is higher if you are 65 or older  A weak immune system, diabetes, or a heart or lung condition can also increase your risk  What are the signs and symptoms of COVID-19? You may not develop any signs or symptoms  Signs and symptoms that do develop usually start about 5 days after infection but can take 2 to 14 days  Signs and symptoms range from mild to severe  You may feel like you have the flu or a bad cold  Information on COVID-19 is still being learned  Tell your healthcare provider if you think you were infected but develop signs or symptoms not listed below:  · A cough    · Shortness of breath or trouble breathing that may become severe    · A fever of at least 100 4°F, or 38°C (may be lower in adults 65 or older)    · Chills that might include shaking    · Muscle pain, body aches, or a headache    · A sore throat    · Suddenly not being able to taste or smell anything    · Feeling mentally and physically tired (fatigue)    · Congestion (stuffy head and nose), or a runny nose    · Diarrhea, nausea, or vomiting    How is COVID-19 diagnosed? If you think you have COVID-19, call your healthcare provider  In some areas, testing is only done if a person has severe symptoms or is hospitalized  Testing is done more widely in other places  Your provider will tell you what to do based on your symptoms and the rules in your area  In general, the following may be used:  · A viral test  shows if you have a current infection  Samples are taken from your nose and throat, usually with swabs  You may need to wait several days to get the test results  Your healthcare provider will tell you how to get your results  You will need to quarantine (stay physically away from others) until you get your results  If results show you have COVID-19, you will need to quarantine until you are well  Your provider or other health official may give you more directions   You will also need to prevent another infection until it is known if you can get COVID-19 again  · An antibody test  shows if you had a past infection  Blood samples are used for this test  Antibodies are made by your immune system to attack the virus that causes COVID-19  Antibodies will form 1 to 3 weeks after you are infected  It is not known if antibodies prevent a second infection, or for how long a person might be protected  If you have antibodies, you will still need to be careful around others until more is known  · CT scans or x-rays  may be used to check for signs of pneumonia  The 2019 coronavirus causes a specific kind of pneumonia, usually in both lungs  How is COVID-19 treated? No medicine or specific treatment is currently approved for COVID-19  The following may be used to manage your symptoms or treat the effects of COVID-19:  · Mild symptoms  may get better on their own  If you do not need to be treated in a hospital, you will be given instructions to use at home  Your condition will be closely monitored  You will need to watch for worsening symptoms and seek immediate care if needed  Talk to your healthcare provider about the following:    ? Relieve your symptoms  To soothe a sore throat, gargle with warm salt water, or use throat lozenges or a throat spray  Your healthcare provider may recommend a cough medicine  Drink more liquids to thin and loosen mucus and to prevent dehydration  Use decongestants or saline drops as directed for nasal congestion  ? NSAIDs or acetaminophen  can help lower a fever and relieve body aches or a headache  Follow directions  If not taken correctly, NSAIDs can cause kidney damage and acetaminophen can cause liver damage  · Severe or life-threatening symptoms  are treated in the hospital  You may need a combination of the following:    ? Medicines  may be given to reduce inflammation or to fight the virus  You may also need blood thinners to prevent or treat blood clots  If you have a deep vein thrombosis (DVT) or pulmonary embolism (PE), you may need to keep using blood thinners for 3 months  ? Extra oxygen  may be given if you have respiratory failure  This means your lungs cannot get enough oxygen into your blood and out to your organs  Extra oxygen can help prevent organ failure  ? A ventilator  may be used to help you breathe  ? Convalescent plasma (part of blood)  from a patient who has recovered from COVID-19 may be used  The plasma contains antibodies that can help your body fight the infection  Convalescent plasma is only given to patients who have severe signs and symptoms  How does the 2019 coronavirus spread? The virus spreads quickly and easily  You can become infected if you are in contact with a large amount of the virus, even for a short time  You can also become infected by being around a small amount of virus for a long time  The following are ways the virus is thought to spread, but more information may be coming:  · Droplets are the most common way all coronaviruses spread  The virus can travel in droplets that form when a person talks, coughs, or sneezes  Anyone who breathes in the droplets or gets them in his or her eyes can become infected with the virus  Close personal contact with an infected person is thought to be the main way the virus spreads  Close personal contact means you are within 6 feet (2 meters) of the person  · Person-to-person contact can spread the virus  For example, a person with the virus on his or her hands can spread it by shaking hands with someone  At this time, it does not appear that the virus can be passed to a baby during pregnancy or delivery  The baby can be infected after he or she is born through person-to-person contact  The virus also does not appear to spread in breast milk  If you are pregnant or breastfeeding, talk to your healthcare provider or obstetrician about any concerns you have      · The virus can stay on objects and surfaces  A person can get the virus on his or her hands by touching the object or surface  Infection happens if the person then touches his or her eyes or mouth with unwashed hands  It is not yet known how long the virus can stay on an object or surface  That is why it is important to clean all surfaces that are used regularly  · An infected animal may be able to infect a person who touches it  This may happen at live markets or on a farm  How can everyone lower the risk for COVID-19? The best way to prevent infection is to avoid anyone who is infected, but this can be hard to do  An infected person can spread the virus before signs or symptoms begin, or even if signs or symptoms never develop  The following can help lower the risk for infection:      · Wash your hands often throughout the day  Use soap and water  Rub your soapy hands together, lacing your fingers  Wash the front and back of each hand, and in between your fingers  Use the fingers of one hand to scrub under the fingernails of the other hand  Wash for at least 20 seconds  Rinse with warm, running water for several seconds  Then dry your hands with a clean towel or paper towel  Use hand  that contains alcohol if soap and water are not available  Do not touch your eyes, nose, or mouth without washing your hands first  Teach children how to wash their hands and use hand   · Cover a sneeze or cough  This prevents droplets from traveling from you to others  Turn your face away and cover your mouth and nose with a tissue  Throw the tissue away  Use the bend of your arm if a tissue is not available  Then wash your hands well with soap and water or use hand   Turn and cover your face if you are around someone who is sneezing or coughing  Teach children how to cover a cough or sneeze  · Follow worldwide, national, and local social distancing guidelines    Social distancing means people avoid close physical contact so the virus cannot spread from one person to another  Keep at least 6 feet (2 meters) between you and others  Also keep this distance from anyone who comes to your home, such as someone making a delivery  · Make a habit of not touching your face  It is not known how long the virus can stay on objects and surfaces  If you get the virus on your hands, you can transfer it to your eyes, nose, or mouth and become infected  You can also transfer it to objects, surfaces, or people  Be aware of what you touch when you go out  Examples include handrails and elevator buttons  Try not to touch anything with bare hands unless it is necessary  Wash your hands before you leave your home and when you return  · Clean and disinfect high-touch surfaces and objects often  Use a disinfecting solution or wipes  You can make a solution by diluting 4 teaspoons of bleach in 1 quart (4 cups) of water  Clean and disinfect even if you think no one living in or coming to your home is infected with the virus  You can wipe items with a disinfecting cloth before you bring them into your home  Wash your hands after you handle anything you bring into your home  · Make your immune system as healthy as possible  A weakened immune system makes you more vulnerable to the new coronavirus  No COVID-19 vaccine is available yet  Vaccines such as the flu and pneumonia vaccines can help your immune system  Your healthcare provider can tell you which vaccines to get, and when to get them  Keep your immune system as strong as possible  Do not smoke  Eat healthy foods, exercise regularly, and try to manage stress  Go to bed and wake up at the same times each day  How do I follow social distancing guidelines to help lower the risk for COVID-19? National and local social distancing rules vary  Rules may change over time as restrictions are lifted  Restrictions may return if an outbreak happens where you live   It is important to know and follow all current social distancing rules in your area  The following are general guidelines:  · Limit trips out of your home  You may be able to have food, medicines, and other supplies delivered  If possible, have delivered items left at your door or other area  Try not to have someone hand you an item  You will be so close to the person that the virus can spread between you  · Do not have close physical contact with anyone who does not live in your home  Do not shake hands with, hug, or kiss a person as a greeting  Stand or walk as far from others as possible  If you must use public transportation (such as a bus or subway), try to sit or stand away from others  You can stay safely connected with others through phone calls, e-mail messages, social media websites, and video chats  Check in on anyone who may be having a hard time socially distancing, or who lives alone  Ask administrators at nursing homes or long-term care facilities how you can safely communicate with someone living there  · Wear a cloth face covering around others who do not live in your home  Face coverings help prevent the virus from spreading to others in droplets  You can use a clear face covering if someone needs to read your lips  This is a cloth covering that has plastic over the mouth area so your lips can be seen  Do not use coverings that have breathing valves or vents  The virus can travel out of the valve or vent and be spread to others  Do not take your covering off to talk, cough, or sneeze  Do not use coverings on children younger than 2 years or on anyone who has breathing problems or cannot remove it  · Only allow medical or other necessary professionals into your home  Wear your face covering, and remind professionals to wear a face covering  Remind them to wash their hands when they arrive and before they leave   Do not  let anyone who does not live in your home in, even if the person is not sick  A person can pass the virus to others before symptoms of COVID-19 begin  Some people never even develop symptoms  Children commonly have mild symptoms or no symptoms  It may be hard to tell a child not to hug or kiss you  Explain that this is how he or she can help you stay healthy  · Do not go to someone else's home unless it is necessary  Do not go over to visit, even if the person is lonely  Only go if you need to help him or her  Make sure you both wear face coverings while you are there  · Avoid large gatherings and crowds  Gatherings or crowds of 10 or more individuals can cause the virus to spread  Examples of gatherings include parties, sporting events, Scientologist services, and conferences  Crowds may form at beaches, andrews, and tourist attractions  Protect yourself by staying away from large gatherings and crowds  · Ask your healthcare provider for other ways to have appointments  You may be able to have appointments without having to go into the provider's office  Some providers offer phone, video, or other types of appointments  You may also be able to get prescriptions for a few months of your medicines at a time  · Stay safe if you must go out to work  You may have a job that can only be done outside your home  Keep physical distance between you and other workers as much as possible  Follow your employer's rules so everyone stays safe  What should I do if I have COVID-19 and am recovering at home? Healthcare providers will give you specific instructions to follow  The following are general guidelines to remind you how to keep others safe until you are well:  · Wash your hands often  Use soap and water as much as possible  You can use hand  that contains alcohol if soap and water are not available  Do not share towels with anyone  If you use paper towels, throw them away in a lined trash can kept in your room or area  Use a covered trash can, if possible      · Do not go out of your home unless it is necessary  You may have to go to your healthcare provider's office for check-ups or to get prescription refills  Do not arrive at the provider's office without an appointment  Providers have to make their offices safe for staff and other patients  · Do not have close physical contact with anyone unless it is necessary  Only have close physical contact with a person giving direct care, or a baby or child you must care for  Family members and friends should not visit you  If possible, stay in a separate area or room of your home if you live with others  No one should go into the area or room except to give you care  You can visit with others by phone, video chat, e-mail, or similar systems  It is important to stay connected with others in your life while you recover  · Wear a face covering while others are near you  This can help prevent droplets from spreading the virus when you talk, sneeze, or cough  Put the covering on before anyone comes into your room or area  Remind the person to cover his or her nose and mouth before going in to provide care for you  · Do not share items  Do not share dishes, towels, or other items with anyone  Items need to be washed after you use them  · Protect your baby  Wash your hands with soap and water often throughout the day  Wear a clean face covering while you breastfeed, or while you express or pump breast milk  If possible, ask someone who is well to care for your baby  You can put breast milk in bottles for the person to use, if needed  Talk to your healthcare provider if you have any questions or concerns about caring for or bonding with your baby  He or she will tell you when to bring your baby in for check-ups and vaccines  He or she will also tell you what to do if you think your baby was infected with the new virus  · Do not handle live animals    Until more is known, it is best not to touch, play with, or handle live animals  Some animals, including pets, have been infected with the new coronavirus  Do not handle or care for animals until you are well  Care includes feeding, petting, and cuddling your pet  Do not let your pet lick you or share your food  Ask someone who is not infected to take care of your pet, if possible  If you must care for a pet, wear a face covering  Wash your hands before and after you give care  · Follow directions from your healthcare provider for being around others after you recover  You will need to wait at least 10 days after symptoms first appeared  Then you will need to have no fever for 24 hours without fever medicine, and no other symptoms  A loss of taste or smell may continue for several months  It is considered okay to be around others if this is your only symptom  It is not known for sure if or for how long a recovered person can pass the virus to others  Your provider may give you instructions, such as continuing social distancing or wearing a face covering around others  How should I take care of someone who has COVID-19? If the person lives in another home, arrange for a time to give care  Remember to bring a few pairs of disposable gloves and a cloth face covering  The following are general guidelines to help you safely care for anyone who has COVID-19:  · Wash your hands often  Wash before and after you go into the person's home, area, or room  Throw paper towels away in a lined trash can that has a lid, if possible  · Do not allow others to go near the person  No one should come into the person's home unless it is necessary  If possible, the person should be in a separate area or room if he or she lives with others  Keep the room's door shut unless you need to go in or out  Have others call, video chat, or e-mail the person if he or she is feeling well enough  The person may feel lonely if he or she is kept separate for a long period of time   Safe communication can help him or her stay connected to family and friends  · Make sure the person's room has good air flow  You may be able to open the window if the weather allows  An air conditioner can also be turned on to help air move  · Contact the person before you go in to give care  Make sure the person is wearing a face covering  Remind him or her to wash his or her hands with soap and water  He or she can use hand  that contains alcohol if soap and water are not available  Put on a face covering before you go in to give care  · Wear gloves while you give care and clean  Clean items the person uses often  Clean countertops, cooking surfaces, and the fronts and insides of the microwave and refrigerator  Clean the shower, toilet, the area around the toilet, the sink, the area around the sink, and faucets  Gather used laundry or bedding  Wash and dry items on the warmest settings the fabric allows  Wash dishes and silverware in hot, soapy water or in a   · Anything you throw away needs to go into a lined trash can  When you need to empty the trash, close the open end of the lining and tie it closed  This helps prevent items the virus is on from spilling out of the trash  Remove your gloves and throw them away  Wash your hands  Where can I find more information? · Centers for Disease Control and Prevention  [address] , [address]  Phone: 6- 531 - [phone]  Web Address: [employer] com     What should I do if I think I or someone I know may be infected? Do the following to protect others:  · If emergency care is needed,  tell the  about the possible infection, or call ahead and tell the emergency department  · Call a healthcare provider  for instructions if symptoms are mild  Anyone who may be infected should not  arrive without calling first  The provider will need to protect staff members and other patients      · The person who may be infected needs to wear a face covering  while getting medical care  This will help lower the risk of infecting others  Coverings are not used for anyone who is younger than 2 years, has breathing problems, or cannot remove it  The provider can give you instructions for anyone who cannot wear a covering  Call your local emergency number (911 in the [address]) or an emergency department if:   · You have trouble breathing or shortness of breath at rest     · You have chest pain or pressure that lasts longer than 5 minutes  · You become confused or hard to wake  · Your lips or face are blue  · You have a fever of 104°F (40°C) or higher  When should I call my doctor? · You do not  have symptoms of COVID-19 but had close physical contact within 14 days with someone who tested positive  · You have questions or concerns about your condition or care  CARE AGREEMENT:   You have the right to help plan your care  Learn about your health condition and how it may be treated  Discuss treatment options with your healthcare providers to decide what care you want to receive  You always have the right to refuse treatment  The above information is an  only  It is not intended as medical advice for individual conditions or treatments  Talk to your doctor, nurse or pharmacist before following any medical regimen to see if it is safe and effective for you  © Copyright [address] Information is for End User's use only and may not be sold, redistributed or otherwise used for commercial purposes   All illustrations and images included in CareNotes® are the copyrighted property of [employer]  or [address] [place] [address] [facility]

## 2021-05-07 NOTE — LACTATION NOTE
This note was copied from a baby's chart  Met with mother to go over discharge breastfeeding booklet including the feeding log  Emphasized 8 or more (12) feedings in a 24 hour period, what to expect for the number of diapers per day of life and the progression of properties of the  stooling pattern  Reviewed breastfeeding and your lifestyle, storage and preparation of breast milk, how to keep you breast pump clean, the employed breastfeeding mother and paced bottle feeding handouts  Booklet included Breastfeeding Resources for after discharge including access to the number for the [address]  No family at bedside at this time  [facility]  to call for assistance, questions and concerns  Extension number for inpatient lactation support provided

## 2021-05-07 NOTE — PROGRESS NOTES
All belongings accounted for by patient and S O  before leaving  Discharge instructions given and reviewed, questions answered  Mother chose to walk off unit escorted by S O  carrying infant and Tommy Ring RN

## 2021-05-07 NOTE — PLAN OF CARE
Problem: PAIN - ADULT  Goal: Verbalizes/displays adequate comfort level or baseline comfort level  Description: Interventions:  - Encourage patient to monitor pain and request assistance  - Assess pain using appropriate pain scale  - Administer analgesics based on type and severity of pain and evaluate response  - Implement non-pharmacological measures as appropriate and evaluate response  - Consider cultural and social influences on pain and pain management  - Notify physician/advanced practitioner if interventions unsuccessful or patient reports new pain  Outcome: Progressing     Problem: INFECTION - ADULT  Goal: Absence or prevention of progression during hospitalization  Description: INTERVENTIONS:  - Assess and monitor for signs and symptoms of infection  - Monitor lab/diagnostic results  - Monitor all insertion sites, i e  indwelling lines, tubes, and drains  - Monitor endotracheal if appropriate and nasal secretions for changes in amount and color  - Wells appropriate cooling/warming therapies per order  - Administer medications as ordered  - Instruct and encourage patient and family to use good hand hygiene technique  - Identify and instruct in appropriate isolation precautions for identified infection/condition  Outcome: Progressing  Goal: Absence of fever/infection during neutropenic period  Description: INTERVENTIONS:  - Monitor WBC    Outcome: Progressing     Problem: SAFETY ADULT  Goal: Patient will remain free of falls  Description: INTERVENTIONS:  - Assess patient frequently for physical needs  -  Identify cognitive and physical deficits and behaviors that affect risk of falls    -  Wells fall precautions as indicated by assessment   - Educate patient/family on patient safety including physical limitations  - Instruct patient to call for assistance with activity based on assessment  - Modify environment to reduce risk of injury  - Consider OT/PT consult to assist with strengthening/mobility  Outcome: Progressing  Goal: Maintain or return to baseline ADL function  Description: INTERVENTIONS:  -  Assess patient's ability to carry out ADLs; assess patient's baseline for ADL function and identify physical deficits which impact ability to perform ADLs (bathing, care of mouth/teeth, toileting, grooming, dressing, etc )  - Assess/evaluate cause of self-care deficits   - Assess range of motion  - Assess patient's mobility; develop plan if impaired  - Assess patient's need for assistive devices and provide as appropriate  - Encourage maximum independence but intervene and supervise when necessary  - Involve family in performance of ADLs  - Assess for home care needs following discharge   - Consider OT consult to assist with ADL evaluation and planning for discharge  - Provide patient education as appropriate  Outcome: Progressing  Goal: Maintain or return mobility status to optimal level  Description: INTERVENTIONS:  - Assess patient's baseline mobility status (ambulation, transfers, stairs, etc )    - Identify cognitive and physical deficits and behaviors that affect mobility  - Identify mobility aids required to assist with transfers and/or ambulation (gait belt, sit-to-stand, lift, walker, cane, etc )  - Proctor fall precautions as indicated by assessment  - Record patient progress and toleration of activity level on Mobility SBAR; progress patient to next Phase/Stage  - Instruct patient to call for assistance with activity based on assessment  - Consider rehabilitation consult to assist with strengthening/weightbearing, etc   Outcome: Progressing     Problem: DISCHARGE PLANNING  Goal: Discharge to home or other facility with appropriate resources  Description: INTERVENTIONS:  - Identify barriers to discharge w/patient and caregiver  - Arrange for needed discharge resources and transportation as appropriate  - Identify discharge learning needs (meds, wound care, etc )  - Arrange for interpretive services to assist at discharge as needed  - Refer to Case Management Department for coordinating discharge planning if the patient needs post-hospital services based on physician/advanced practitioner order or complex needs related to functional status, cognitive ability, or social support system  Outcome: Progressing     Problem: POSTPARTUM  Goal: Experiences normal postpartum course  Description: INTERVENTIONS:  - Monitor maternal vital signs  - Assess uterine involution and lochia  Outcome: Progressing  Goal: Appropriate maternal -  bonding  Description: INTERVENTIONS:  - Identify family support  - Assess for appropriate maternal/infant bonding   -Encourage maternal/infant bonding opportunities  - Referral to  or  as needed  Outcome: Progressing  Goal: Establishment of infant feeding pattern  Description: INTERVENTIONS:  - Assess breast/bottle feeding  - Refer to lactation as needed  Outcome: Progressing  Goal: Incision(s), wounds(s) or drain site(s) healing without S/S of infection  Description: INTERVENTIONS  - Assess and document risk factors for skin impairment   - Assess and document dressing, incision, wound bed, drain sites and surrounding tissue  - Consider nutrition services referral as needed  - Oral mucous membranes remain intact  - Provide patient/ family education  Outcome: Progressing     Problem: Potential for Falls  Goal: Patient will remain free of falls  Description: INTERVENTIONS:  - Assess patient frequently for physical needs  -  Identify cognitive and physical deficits and behaviors that affect risk of falls    -  [place]mar fall precautions as indicated by assessment   - Educate patient/family on patient safety including physical limitations  - Instruct patient to call for assistance with activity based on assessment  - Modify environment to reduce risk of injury  - Consider OT/PT consult to assist with strengthening/mobility  Outcome: Progressing

## 2021-05-07 NOTE — PROGRESS NOTES
Progress Note - OB/GYN   Terry Seay 31 y o  female MRN: [id number]  Unit/Bed#: L&D 306-01 Encounter: [id number]    Assessment:  Post partum Day #2 s/p  complicated by PPH, stable, baby in room with mother, with high-intermediate bilirubin    Plan:  1  Post partum  - Continue routine post partum   - Encourage ambulation  - Encourage breastfeeding    2  Acute blood loss anemia with PPH  - QBL 1100, Hgb 11 9 -> 9 5    3  Anxiety/Depression  - Continue home zoloft 125mg qD    4  Discharge planning  - Anticipate discharge today        Subjective/Objective   Subjective: This morning, she has no complaints       Pain: yes, some soreness and cramping, improved with meds  Tolerating PO: yes  Voiding: yes  Flatus: yes  BM: no  Ambulating: yes  Breastfeeding:  yes  Chest pain: no  Shortness of breath: no  Leg pain: no  Lochia: minimal    Objective:     Vitals: /58 (BP Location: Right arm)   Pulse 89   Temp 98 2 °F (36 8 °C) (Temporal)   Resp 18   Ht 5' 7" (1 702 m)   Wt 98 4 kg (217 lb)   SpO2 97%   Breastfeeding Yes   BMI 33 99 kg/m²       Intake/Output Summary (Last 24 hours) at 2021 0612  Last data filed at 2021 1445  Gross per 24 hour   Intake --   Output 400 ml   Net -400 ml       Lab Results   Component Value Date    WBC 15 17 (H) 2021    HGB 9 5 (L) 2021    HCT 28 3 (L) 2021    MCV 93 2021     2021       Physical Exam:   Physical Exam  NAD  Breathing comfortably on room air  Abdomen soft, nontender, nondistended  Uterine fundus firm, nontender, at 2cm below the umbilicus  WWP, intact distal pulses    Myrla Leyden, MD  2021  6:12 AM

## 2021-05-13 LAB — PLACENTA IN STORAGE: NORMAL

## 2021-11-08 ENCOUNTER — TELEPHONE (OUTPATIENT)
Dept: HEMATOLOGY ONCOLOGY | Facility: CLINIC | Age: 36
End: 2021-11-08

## 2022-09-06 LAB
EXTERNAL CHLAMYDIA SCREEN: NEGATIVE
EXTERNAL GONORRHEA SCREEN: NEGATIVE

## 2022-09-30 LAB
EXTERNAL ABO GROUPING: NORMAL
EXTERNAL ANTIBODY SCREEN: NORMAL
EXTERNAL HEPATITIS B SURFACE ANTIGEN: NEGATIVE
EXTERNAL HIV-1/2 AB-AG: NORMAL
EXTERNAL RH FACTOR: POSITIVE
EXTERNAL RUBELLA IGG QUANTITATION: NORMAL
EXTERNAL SYPHILIS RPR SCREEN: NORMAL

## 2022-12-20 LAB
EXTERNAL HEMATOCRIT: 31.5 %
EXTERNAL HEMOGLOBIN: 10.3 G/DL
EXTERNAL PLATELET COUNT: 404 K/ÂΜL
GLUCOSE 1H P 50 G GLC PO SERPL-MCNC: 144 MG/DL (ref 70–183)

## 2023-02-14 ENCOUNTER — LAB REQUISITION (OUTPATIENT)
Dept: LAB | Facility: HOSPITAL | Age: 38
End: 2023-02-14

## 2023-02-14 DIAGNOSIS — Z36.85 ENCOUNTER FOR ANTENATAL SCREENING FOR STREPTOCOCCUS B: ICD-10-CM

## 2023-02-15 LAB — GP B STREP DNA SPEC QL NAA+PROBE: NEGATIVE

## 2023-02-17 LAB
GLUCOSE 1H P GLC SERPL-MCNC: 180 MG/DL (ref 70–179)
GLUCOSE 2H P 75 G GLC PO SERPL-MCNC: 157 MG/DL (ref 70–154)
GLUCOSE 3H P 100 G GLC PO SERPL-MCNC: 89 MG/DL (ref 70–139)
GLUCOSE P FAST SERPL-MCNC: 81 MG/DL (ref 70–94)

## 2023-02-21 ENCOUNTER — TRANSCRIBE ORDERS (OUTPATIENT)
Facility: HOSPITAL | Age: 38
End: 2023-02-21

## 2023-02-21 DIAGNOSIS — O99.810 ABNORMAL MATERNAL GLUCOSE TOLERANCE, ANTEPARTUM: Primary | ICD-10-CM

## 2023-02-23 ENCOUNTER — TELEPHONE (OUTPATIENT)
Facility: HOSPITAL | Age: 38
End: 2023-02-23

## 2023-02-23 NOTE — TELEPHONE ENCOUNTER
LVM to PT 2/23 regarding r/s her diabetic education and a DT U/S per referrals from OB  PT had diabetic education scheduled, but was scheduled incorrectly as PT is 37 weeks   Left office number for PT to call back when available to r/s diabetic education and schedule DT U/S

## 2023-02-27 ENCOUNTER — HOSPITAL ENCOUNTER (OUTPATIENT)
Facility: HOSPITAL | Age: 38
Discharge: HOME | End: 2023-02-27
Attending: OBSTETRICS & GYNECOLOGY | Admitting: OBSTETRICS & GYNECOLOGY

## 2023-02-27 VITALS
TEMPERATURE: 98.6 F | HEART RATE: 91 BPM | DIASTOLIC BLOOD PRESSURE: 75 MMHG | RESPIRATION RATE: 16 BRPM | SYSTOLIC BLOOD PRESSURE: 124 MMHG

## 2023-02-27 PROBLEM — O24.419 GESTATIONAL DIABETES: Status: ACTIVE | Noted: 2023-02-27

## 2023-02-27 PROBLEM — Z3A.37 37 WEEKS GESTATION OF PREGNANCY: Status: ACTIVE | Noted: 2023-02-27

## 2023-02-27 LAB
ABO GROUP BLD: NORMAL
BILIRUB UR QL STRIP: NEGATIVE
BLD GP AB SCN SERPL QL: NEGATIVE
CLARITY UR: CLEAR
COLOR UR: YELLOW
GLUCOSE UR STRIP-MCNC: NEGATIVE MG/DL
HGB UR QL STRIP.AUTO: NEGATIVE
KETONES UR STRIP-MCNC: ABNORMAL MG/DL
LEUKOCYTE ESTERASE UR QL STRIP: NEGATIVE
NITRITE UR QL STRIP: NEGATIVE
PH UR STRIP.AUTO: 7.5 [PH]
PROT UR STRIP-MCNC: NEGATIVE MG/DL
RH BLD: POSITIVE
SP GR UR STRIP.AUTO: 1.02 (ref 1–1.03)
SPECIMEN EXPIRATION DATE: NORMAL
UROBILINOGEN UR QL STRIP.AUTO: 0.2 E.U./DL

## 2023-02-27 RX ORDER — SODIUM CHLORIDE 9 MG/ML
125 INJECTION, SOLUTION INTRAVENOUS CONTINUOUS
Status: DISCONTINUED | OUTPATIENT
Start: 2023-02-27 | End: 2023-02-27 | Stop reason: HOSPADM

## 2023-02-27 RX ADMIN — SODIUM CHLORIDE 125 ML/HR: 0.9 INJECTION, SOLUTION INTRAVENOUS at 12:36

## 2023-02-27 NOTE — H&P
[address] [place] [address] 30 y o  female MRN: [id number]  Unit/Bed#: L&D 322-01 Encounter: [id number]    Assessment: 30 y o   at 37w5d admitted for labor   SVE: 5 /-2  Clinical EFW: 73rd percentile  ; Cephalic confirmed by ***  GBS status: negative   Postpartum contraception plan: desires bilateral salpingectomy     Plan:   No new Assessment & Plan notes have been filed under this hospital service since the last note was generated  Service: OB/GYN        Discussed case and plan w/ Dr Robert Daniels      Chief Complaint: contractions    HPI: Shannon Toney is a 30 y o   with an SHAWN of 3/15/2023, by Patient Reported at 37w5d who is being admitted for labor   She complains of uterine contractions, occurring every 2-5 minutes, has no LOF, and reports no VB  She states she has felt good FM  Patient Active Problem List   Diagnosis   • COVID-19 affecting pregnancy in second trimester   • Breast skin changes   • Maternal obesity affecting pregnancy, antepartum   • 37 weeks gestation of pregnancy       Baby complications/comments: none    Review of Systems   Constitutional: Negative for chills and fever  Respiratory: Negative for cough, shortness of breath and wheezing  Cardiovascular: Negative for chest pain and leg swelling  Gastrointestinal: Negative for abdominal pain, diarrhea, nausea and vomiting  Genitourinary: Negative for pelvic pain, vaginal bleeding and vaginal discharge  Musculoskeletal: Negative for back pain  Neurological: Negative for weakness, light-headedness and headaches         OB Hx:  OB History    Para Term  AB Living   2 1 1     1   SAB IAB Ectopic Multiple Live Births         0 1      # Outcome Date GA Lbr Jerman/2nd Weight Sex Delivery Anes PTL Lv   2 Current            1 Term 21 39w0d / 00:29 3410 g (7 lb 8 3 oz) M Vag-Spont Local N QUAN       Past Medical Hx:  Past Medical History:   Diagnosis Date   • Anemia    • Anxiety        Past Surgical hx:  Past Surgical History:   Procedure Laterality Date   • WISDOM TOOTH EXTRACTION         Social Hx:  Alcohol use: denies  Tobacco use: denies  Other substance use: denies    No Known Allergies    Medications Prior to Admission   Medication   • acetaminophen (TYLENOL) 325 mg tablet   • cholecalciferol (VITAMIN D3) 400 units tablet   • docusate sodium (COLACE) 100 mg capsule   • Ferrous Sulfate (SLOW FE PO)   • hydrocortisone 1 % cream   • ibuprofen (MOTRIN) 600 mg tablet   • Pyridoxine HCl (vitamin B-6) 25 MG tablet   • sertraline (ZOLOFT) 100 mg tablet   • witch hazel-glycerin (TUCKS) topical pad       Objective:  Temp:  [98 6 °F (37 °C)-98 9 °F (37 2 °C)] 98 6 °F (37 °C)  HR:  [91] 91  Resp:  [16-18] 16  BP: (124)/(75) 124/75  There is no height or weight on file to calculate BMI  Physical Exam:  Physical Exam  Constitutional:       Appearance: Normal appearance  Cardiovascular:      Rate and Rhythm: Normal rate and regular rhythm  Pulmonary:      Effort: Pulmonary effort is normal  No respiratory distress  Abdominal:      Palpations: Abdomen is soft  Tenderness: There is no abdominal tenderness  Musculoskeletal:         General: No swelling or tenderness  Neurological:      General: No focal deficit present  Mental Status: She is alert and oriented to person, place, and time  Skin:     General: Skin is warm and dry  Vitals reviewed              FHT:  Baseline Rate: 135 bpm  Variability: Moderate 6-25 bpm  Accelerations: 15 x 15 or greater  Decelerations: None    TOCO:   Contraction Frequency (minutes): irregular  Contraction Duration (seconds):   Contraction Quality: Mild    Lab Results   Component Value Date    WBC 15 17 (H) 05/06/2021    HGB 9 5 (L) 05/06/2021    HCT 28 3 (L) 05/06/2021     05/06/2021     No results found for: NA, K, CL, CO2, BUN, CREATININE, GLUCOSE, AST, ALT  Prenatal Labs: Reviewed      Blood type: A+  Antibody: Negative   GBS: Negative HIV: Non-reactive  Rubella: Immune  VDRL/RPR: Non reactive  HBsAg: Negative  Chlamydia: Negative  Gonorrhea: Negative  Diabetes 1 hour screen: 144  3 hour glucose: 180,157,89  Platelets: 525  Hgb: 10 3  >2 Midnights  INPATIENT     Signature/Title: Vannesa Johnson MD  Date: 2/27/2023  Time: 5:40 PM

## 2023-02-27 NOTE — PROGRESS NOTES
L&D Triage Note - OB/GYN  Karol Valdivia 30 y o  female MRN: [id number]  Unit/Bed#: L&D 322-01 Encounter: [id number]      ASSESSMENT:    Karol Valdivia is a 30 y o   at 37w5d was evaluated today in triage for labor  She made minimal change throughout the day and continuous to endorse contractions every 2-5 minutes  She has been given IV and oral hydration  PLAN:    1) SVE  2) Hydration  3) Fetal monitoring  4) Final disposition per night team     SUBJECTIVE:    Karol Valdivia 30 y o  Keiko Hernandez at 37w5d with an Estimated Date of Delivery: 3/15/23  who presents today with a chief complaint of contractions that began overnight  She denies any vaginal bleeding or loss of fluid  She endorses good fetal movement  She says that the contractions occur every 2-5 minutes and have gotten worse throughout the course of her time being evaluated  She says that she had a similar presentation in her first pregnancy and she delivered her first child within 20 minutes of AROM  Her first delivery was also complicated by sulcal laceration and postpartum hemorrhage  This pregnancy has been complicated by a recent diagnosis of gestational diabetes           OBJECTIVE:    Vitals:    23 1044   BP: 124/75   Pulse: 91   Resp: 18   Temp: 98 9 °F (37 2 °C)       ROS:  Constitutional: Negative  Respiratory: Negative  Cardiovascular: Negative    Gastrointestinal: Negative    General Physical Exam:  General: in no apparent distress  Cardiovascular: Cor irreg, irreg RRR  Lungs: non-labored breathing  Abdomen: abdomen is soft without significant tenderness, masses, organomegaly or guarding  Lower extremeties: nontender    Cervical Exam  5 5/80/-1, some blood noted on glove following cervical exam   Fetal monitoring:  FHT:  130 bpm/ Moderate 6 - 25 bpm / 15 x 15 accelerations present, no decelerations  Metzger: contractions noted, difficult to trace, when tracing well they are noted every 2-5 minutes     Urine Dip    - wnl            Luis Samuels MD,  OBGYN PGY-1  2/27/2023 1:39 PM

## 2023-02-28 ENCOUNTER — TELEMEDICINE (OUTPATIENT)
Dept: PERINATAL CARE | Facility: CLINIC | Age: 38
End: 2023-02-28

## 2023-02-28 ENCOUNTER — PATIENT MESSAGE (OUTPATIENT)
Dept: PERINATAL CARE | Facility: CLINIC | Age: 38
End: 2023-02-28

## 2023-02-28 DIAGNOSIS — O24.410 DIET CONTROLLED GESTATIONAL DIABETES MELLITUS (GDM) IN THIRD TRIMESTER: Primary | ICD-10-CM

## 2023-02-28 DIAGNOSIS — Z3A.37 37 WEEKS GESTATION OF PREGNANCY: ICD-10-CM

## 2023-02-28 DIAGNOSIS — O99.810 ABNORMAL MATERNAL GLUCOSE TOLERANCE, ANTEPARTUM: ICD-10-CM

## 2023-02-28 DIAGNOSIS — O99.213 OBESITY COMPLICATING PREGNANCY, THIRD TRIMESTER: ICD-10-CM

## 2023-02-28 NOTE — PROGRESS NOTES
Virtual Regular Visit    Verification of patient location: [place]    Patient is located in the following state in which I hold an active license [place]      Assessment/Plan:    Problem List Items Addressed This Visit    None  Visit Diagnoses     Abnormal maternal glucose tolerance, antepartum                   Reason for visit is   Chief Complaint   Patient presents with   • Virtual Regular Visit        Encounter provider Rustam Morillo    Provider located at [address] [place] [id number] [phone]      Recent Visits  No visits were found meeting these conditions  Showing recent visits within past 7 days and meeting all other requirements  Today's Visits  Date Type Provider Dept   02/28/23 1501 [facility]   Showing today's visits and meeting all other requirements  Future Appointments  No visits were found meeting these conditions  Showing future appointments within next 150 days and meeting all other requirements       The patient was identified by name and date of birth  Madeleine Sullivan was informed that this is a telemedicine visit and that the visit is being conducted through the [employer]  She agrees to proceed     My office door was closed  No one else was in the room  She acknowledged consent and understanding of privacy and security of the video platform  The patient has agreed to participate and understands they can discontinue the visit at any time  Patient is aware this is a billable service  Subjective  Patys Ruelas is a 30 y o  female pregnant patient  Virgilio Harps       HPI     Past Medical History:   Diagnosis Date   • Anemia    • Anxiety    • Gestational diabetes 2/27/2023       Past Surgical History:   Procedure Laterality Date   • WISDOM TOOTH EXTRACTION         Current Outpatient Medications   Medication Sig Dispense Refill   • acetaminophen (TYLENOL) 325 mg tablet Take 2 tablets (650 mg total) by mouth every 4 (four) hours as needed for mild pain, moderate pain, severe pain, headaches or fever  0   • cholecalciferol (VITAMIN D3) 400 units tablet Take by mouth daily     • docusate sodium (COLACE) 100 mg capsule Take 1 capsule (100 mg total) by mouth 2 (two) times a day 10 capsule 0   • Ferrous Sulfate (SLOW FE PO) Take by mouth     • hydrocortisone 1 % cream Apply 1 application topically 4 (four) times a day as needed for irritation or rash 30 g 0   • ibuprofen (MOTRIN) 600 mg tablet Take 1 tablet (600 mg total) by mouth every 6 (six) hours as needed for mild pain, moderate pain, fever or headaches 30 tablet 0   • Pyridoxine HCl (vitamin B-6) 25 MG tablet Take 25 mg by mouth daily     • sertraline (ZOLOFT) 100 mg tablet Take 125 mg by mouth daily      • witch hazel-glycerin (TUCKS) topical pad Apply 1 pad topically every 2 (two) hours as needed for irritation, hemorrhoids or blanquita-anal irritation  0     No current facility-administered medications for this visit  No Known Allergies    Review of Systems    Video Exam    There were no vitals filed for this visit  Physical Exam --not performed  I spent 60 minutes directly with the patient during this visit       Thank you for referring your patient to [facility] Maternal Fetal Medicine Diabetes in Pregnancy Program      Augustine Chandler is a  30 y o  female who presents today at 41w10d gestation, Estimated Date of Delivery: 3/15/23  As the patient is > 36 weeks GA, her primary treatment for gestational diabetes will be blood glucose monitoring, no concentrated sweets diet, and insulin therapy, if needed  Had connection problems & only had sound availabl & not video  Reviewed and updated the following from patients medical record: PMH, Problem List, Allergies, and Current Medications      Visit Diagnosis:  Diet controlled GDM    Discussed with patient pathophysiology of GDM, untreated hyperglycemia in pregnancy and maternal fetal complications including fetal macrosomia,  hypoglycemia, polyhydramnios, increased incidence of  section,  labor, and in severe cases fetal demise and still birth   Discussed importance of blood glucose monitoring, nutrition, and medication if necessary in achieving BG goals  Additional Pregnancy Complications:  Obesity    Labs:    22 1 hour GTT-144    23 3 hour GTT-81, 186, 1657, 89    No components found for: HGA1C; ordered HbA1c today  Encouraged patient to complete soon  Medications:  No diabetes related medications    Anthropometrics:  Ht Readings from Last 3 Encounters:   21 5' 7" (1 702 m)   21 5' 7" (1 702 m)   21 5' 7" (1 702 m)     Wt Readings from Last 3 Encounters:   21 98 4 kg (217 lb)   21 98 4 kg (217 lb)   21 98 4 kg (217 lb)     Pre-gravid weight: 212 pounds  Pre-gravid BMI: 33 2  Weight Change: Lost 8 pounds   Weight gain recommendations: BMI (> 30) 11-20 lbs  Comments:Pateint needs to maintain her weight for the reminder of the pregnancy  Recent Ultra Sound Results:  Next US Date: to be rescheduled  Patient cancelled today's ultrasound due to snow  Blood Glucose Monitoring:   Glucose Meter: OneTouch Verio Flex  Instructed on testing blood sugars: 4 x per day (Fasting, 2 hour after start of each meal)    Gave instruction on site selection, skin preparation, loading strips and lancet device, meter activation, obtaining blood sample, test strip and lancet disposal and storage, and recording log book entries  Patient has good understanding of material covered and was able to test their own blood sugar in office today       Instruction for reporting blood sugar results weekly via:  Phone: [phone]   OR  My Chart (Message with image attachment, or Glucose Flowsheet)    Goal Blood Sugar Ranges:   Fastin-90 mg/dL  1 hour after the start of each meal: 140 mg/dL or <  2 hours after start of each meal: 120 mg/dL or <    Meal Plan:  No concentrated sweets    Type of Diet:Regular  Additional Nutrition Concerns:  cooks meals  Reported she is dining out less than in the past      24 hr Diet Recall:  Provided at this appointment    Diet Review: Eats 3 meals & only fruit for all 3 snacks  Advised to add protein to all her snacks  Meal Plan Tips:  1  Patient was provided with diet tips following a no concentrated sweets diet  2  Encouraged meal pattern of 3 small meals and 3 snacks daily, eating every 2-3 5 hours while awake  3  Encouraged patient to go no longer than 8-10 hours fasting overnight  4  Discussed importance of consuming protein at every meal and snack      Physical Activity:  Discussed benefits of physical activity to optimize blood glucose control, encouraged activity at patient is physically able  Always consult a physician prior to starting an exercise program  Recommend 20-30 minutes daily  Maternal-Fetal Testing:  Discussed maternal-fetal surveillance for diabetes care during pregnancy  *Per OB/MFM physician recommendations      Patient Stated Goal: "I will check my blood sugar 4 times each day, as directed by diabetes and pregnancy team"    Diabetes Self Management Support Plan outside of ongoing care: Spouse/Family    Learner/s Present:Learners Present: Patient   Barriers to Learning/Change: No Barriers  Expected Compliance: good    Date to report blood sugars: Weekly  F/U date: As Needed    Begin Time: 1:30 PM  End Time: 2:30 PM    It was a pleasure working with them today  Please feel free to call with any questions or concerns      Rudi Nguyen  Diabetes Educator  [facility] Maternal Fetal Medicine  Diabetes in Pregnancy Program  [address]  [address]

## 2023-03-01 RX ORDER — BLOOD-GLUCOSE METER
EACH MISCELLANEOUS
Qty: 1 KIT | Refills: 0 | Status: SHIPPED | OUTPATIENT
Start: 2023-03-01 | End: 2023-03-15

## 2023-03-01 RX ORDER — BLOOD SUGAR DIAGNOSTIC
STRIP MISCELLANEOUS
Qty: 100 STRIP | Refills: 1 | Status: SHIPPED | OUTPATIENT
Start: 2023-03-01 | End: 2023-03-15

## 2023-03-01 RX ORDER — LANCETS 33 GAUGE
EACH MISCELLANEOUS
Qty: 100 EACH | Refills: 1 | Status: SHIPPED | OUTPATIENT
Start: 2023-03-01 | End: 2023-03-15

## 2023-03-03 ENCOUNTER — ANESTHESIA (INPATIENT)
Dept: ANESTHESIOLOGY | Facility: HOSPITAL | Age: 38
End: 2023-03-03

## 2023-03-03 ENCOUNTER — ANESTHESIA EVENT (INPATIENT)
Dept: ANESTHESIOLOGY | Facility: HOSPITAL | Age: 38
End: 2023-03-03

## 2023-03-03 ENCOUNTER — HOSPITAL ENCOUNTER (INPATIENT)
Facility: HOSPITAL | Age: 38
LOS: 1 days | Discharge: HOME | End: 2023-03-04
Attending: OBSTETRICS & GYNECOLOGY | Admitting: OBSTETRICS & GYNECOLOGY

## 2023-03-03 DIAGNOSIS — Z3A.38 38 WEEKS GESTATION OF PREGNANCY: Primary | ICD-10-CM

## 2023-03-03 LAB
ABO GROUP BLD: NORMAL
BASE EXCESS BLDCOA CALC-SCNC: -7.6 MMOL/L (ref 3–11)
BASE EXCESS BLDCOV CALC-SCNC: -4.5 MMOL/L (ref 1–9)
BLD GP AB SCN SERPL QL: NEGATIVE
ERYTHROCYTE [DISTWIDTH] IN BLOOD BY AUTOMATED COUNT: 14 % (ref 11.6–15.1)
ERYTHROCYTE [DISTWIDTH] IN BLOOD BY AUTOMATED COUNT: 14 % (ref 11.6–15.1)
GLUCOSE SERPL-MCNC: 83 MG/DL (ref 65–140)
HCO3 BLDCOA-SCNC: 20.3 MMOL/L (ref 17.3–27.3)
HCO3 BLDCOV-SCNC: 18.9 MMOL/L (ref 12.2–28.6)
HCT VFR BLD AUTO: 25.1 % (ref 34.8–46.1)
HCT VFR BLD AUTO: 30.9 % (ref 34.8–46.1)
HGB BLD-MCNC: 8 G/DL (ref 11.5–15.4)
HGB BLD-MCNC: 9.8 G/DL (ref 11.5–15.4)
MCH RBC QN AUTO: 27.1 PG (ref 26.8–34.3)
MCH RBC QN AUTO: 27.5 PG (ref 26.8–34.3)
MCHC RBC AUTO-ENTMCNC: 31.7 G/DL (ref 31.4–37.4)
MCHC RBC AUTO-ENTMCNC: 31.9 G/DL (ref 31.4–37.4)
MCV RBC AUTO: 85 FL (ref 82–98)
MCV RBC AUTO: 86 FL (ref 82–98)
O2 CT VFR BLDCOA CALC: 6.7 ML/DL
OXYHGB MFR BLDCOA: 29.1 %
OXYHGB MFR BLDCOV: 86.5 %
PCO2 BLDCOA: 49.9 MM[HG] (ref 30–60)
PCO2 BLDCOV: 31.3 MM HG (ref 27–43)
PH BLDCOA: 7.23 [PH] (ref 7.23–7.43)
PH BLDCOV: 7.4 [PH] (ref 7.19–7.49)
PLATELET # BLD AUTO: 340 THOUSANDS/UL (ref 149–390)
PLATELET # BLD AUTO: 346 THOUSANDS/UL (ref 149–390)
PMV BLD AUTO: 10.4 FL (ref 8.9–12.7)
PMV BLD AUTO: 10.5 FL (ref 8.9–12.7)
PO2 BLDCOA: 17.9 MM HG (ref 5–25)
PO2 BLDCOV: 41.4 MM HG (ref 15–45)
RBC # BLD AUTO: 2.91 MILLION/UL (ref 3.81–5.12)
RBC # BLD AUTO: 3.62 MILLION/UL (ref 3.81–5.12)
RH BLD: POSITIVE
SAO2 % BLDCOV: 20.4 ML/DL
SPECIMEN EXPIRATION DATE: NORMAL
TREPONEMA PALLIDUM IGG+IGM AB [PRESENCE] IN SERUM OR PLASMA BY IMMUNOASSAY: NORMAL
WBC # BLD AUTO: 11.65 THOUSAND/UL (ref 4.31–10.16)
WBC # BLD AUTO: 8.08 THOUSAND/UL (ref 4.31–10.16)

## 2023-03-03 RX ORDER — ACETAMINOPHEN 325 MG/1
650 TABLET ORAL EVERY 4 HOURS PRN
Status: DISCONTINUED | OUTPATIENT
Start: 2023-03-03 | End: 2023-03-04 | Stop reason: HOSPADM

## 2023-03-03 RX ORDER — BUPIVACAINE HYDROCHLORIDE 2.5 MG/ML
30 INJECTION, SOLUTION EPIDURAL; INFILTRATION; INTRACAUDAL ONCE AS NEEDED
Status: DISCONTINUED | OUTPATIENT
Start: 2023-03-03 | End: 2023-03-03

## 2023-03-03 RX ORDER — OXYTOCIN/RINGER'S LACTATE 30/500 ML
PLASTIC BAG, INJECTION (ML) INTRAVENOUS
Status: DISCONTINUED
Start: 2023-03-03 | End: 2023-03-03 | Stop reason: WASHOUT

## 2023-03-03 RX ORDER — OXYTOCIN/RINGER'S LACTATE 30/500 ML
PLASTIC BAG, INJECTION (ML) INTRAVENOUS
Status: COMPLETED
Start: 2023-03-03 | End: 2023-03-03

## 2023-03-03 RX ORDER — DIAPER,BRIEF,INFANT-TODD,DISP
1 EACH MISCELLANEOUS DAILY PRN
Status: DISCONTINUED | OUTPATIENT
Start: 2023-03-03 | End: 2023-03-04 | Stop reason: HOSPADM

## 2023-03-03 RX ORDER — SERTRALINE HYDROCHLORIDE 100 MG/1
100 TABLET, FILM COATED ORAL DAILY
Status: DISCONTINUED | OUTPATIENT
Start: 2023-03-03 | End: 2023-03-04 | Stop reason: HOSPADM

## 2023-03-03 RX ORDER — METHYLERGONOVINE MALEATE 0.2 MG/ML
INJECTION INTRAVENOUS
Status: COMPLETED
Start: 2023-03-03 | End: 2023-03-03

## 2023-03-03 RX ORDER — CALCIUM CARBONATE 200(500)MG
1000 TABLET,CHEWABLE ORAL DAILY PRN
Status: DISCONTINUED | OUTPATIENT
Start: 2023-03-03 | End: 2023-03-04 | Stop reason: HOSPADM

## 2023-03-03 RX ORDER — DOCUSATE SODIUM 100 MG/1
100 CAPSULE, LIQUID FILLED ORAL 2 TIMES DAILY
Status: DISCONTINUED | OUTPATIENT
Start: 2023-03-03 | End: 2023-03-04 | Stop reason: HOSPADM

## 2023-03-03 RX ORDER — DIPHENHYDRAMINE HCL 25 MG
25 TABLET ORAL EVERY 6 HOURS PRN
Status: DISCONTINUED | OUTPATIENT
Start: 2023-03-03 | End: 2023-03-04 | Stop reason: HOSPADM

## 2023-03-03 RX ORDER — SODIUM CHLORIDE 9 MG/ML
125 INJECTION, SOLUTION INTRAVENOUS CONTINUOUS
Status: DISCONTINUED | OUTPATIENT
Start: 2023-03-03 | End: 2023-03-03

## 2023-03-03 RX ORDER — METHYLERGONOVINE MALEATE 0.2 MG/ML
0.2 INJECTION INTRAVENOUS ONCE
Status: DISCONTINUED | OUTPATIENT
Start: 2023-03-03 | End: 2023-03-04 | Stop reason: HOSPADM

## 2023-03-03 RX ORDER — SODIUM CHLORIDE, SODIUM LACTATE, POTASSIUM CHLORIDE, CALCIUM CHLORIDE 600; 310; 30; 20 MG/100ML; MG/100ML; MG/100ML; MG/100ML
125 INJECTION, SOLUTION INTRAVENOUS CONTINUOUS
Status: DISCONTINUED | OUTPATIENT
Start: 2023-03-03 | End: 2023-03-03

## 2023-03-03 RX ORDER — OXYTOCIN/RINGER'S LACTATE 30/500 ML
250 PLASTIC BAG, INJECTION (ML) INTRAVENOUS ONCE
Status: DISCONTINUED | OUTPATIENT
Start: 2023-03-03 | End: 2023-03-03 | Stop reason: SDUPTHER

## 2023-03-03 RX ORDER — CARBOPROST TROMETHAMINE 250 UG/ML
INJECTION, SOLUTION INTRAMUSCULAR
Status: DISCONTINUED
Start: 2023-03-03 | End: 2023-03-03 | Stop reason: WASHOUT

## 2023-03-03 RX ORDER — OXYTOCIN/RINGER'S LACTATE 30/500 ML
250 PLASTIC BAG, INJECTION (ML) INTRAVENOUS ONCE
Status: COMPLETED | OUTPATIENT
Start: 2023-03-03 | End: 2023-03-03

## 2023-03-03 RX ORDER — ROPIVACAINE HYDROCHLORIDE 2 MG/ML
INJECTION, SOLUTION EPIDURAL; INFILTRATION; PERINEURAL AS NEEDED
Status: DISCONTINUED | OUTPATIENT
Start: 2023-03-03 | End: 2023-03-03 | Stop reason: HOSPADM

## 2023-03-03 RX ORDER — IBUPROFEN 600 MG/1
600 TABLET ORAL EVERY 6 HOURS
Status: DISCONTINUED | OUTPATIENT
Start: 2023-03-03 | End: 2023-03-04 | Stop reason: HOSPADM

## 2023-03-03 RX ORDER — ROPIVACAINE HYDROCHLORIDE 2 MG/ML
INJECTION, SOLUTION EPIDURAL; INFILTRATION; PERINEURAL CONTINUOUS PRN
Status: DISCONTINUED | OUTPATIENT
Start: 2023-03-03 | End: 2023-03-03 | Stop reason: HOSPADM

## 2023-03-03 RX ORDER — ONDANSETRON 2 MG/ML
4 INJECTION INTRAMUSCULAR; INTRAVENOUS EVERY 8 HOURS PRN
Status: DISCONTINUED | OUTPATIENT
Start: 2023-03-03 | End: 2023-03-04 | Stop reason: HOSPADM

## 2023-03-03 RX ADMIN — ROPIVACAINE HYDROCHLORIDE 8 ML/HR: 2 INJECTION, SOLUTION EPIDURAL; INFILTRATION; PERINEURAL at 06:46

## 2023-03-03 RX ADMIN — BENZOCAINE AND LEVOMENTHOL 1 APPLICATION.: 200; 5 SPRAY TOPICAL at 12:41

## 2023-03-03 RX ADMIN — IBUPROFEN 600 MG: 600 TABLET, FILM COATED ORAL at 16:10

## 2023-03-03 RX ADMIN — DOCUSATE SODIUM 100 MG: 100 CAPSULE, LIQUID FILLED ORAL at 18:18

## 2023-03-03 RX ADMIN — WITCH HAZEL 1 PAD.: 500 SOLUTION RECTAL; TOPICAL at 10:28

## 2023-03-03 RX ADMIN — METHYLERGONOVINE MALEATE 0.2 MG: 0.2 INJECTION, SOLUTION INTRAMUSCULAR; INTRAVENOUS at 07:24

## 2023-03-03 RX ADMIN — ACETAMINOPHEN 325MG 650 MG: 325 TABLET ORAL at 21:11

## 2023-03-03 RX ADMIN — ROPIVACAINE HYDROCHLORIDE 5 ML: 2 INJECTION, SOLUTION EPIDURAL; INFILTRATION at 06:44

## 2023-03-03 RX ADMIN — IBUPROFEN 600 MG: 600 TABLET, FILM COATED ORAL at 09:25

## 2023-03-03 RX ADMIN — DOCUSATE SODIUM 100 MG: 100 CAPSULE, LIQUID FILLED ORAL at 09:24

## 2023-03-03 RX ADMIN — IRON SUCROSE 200 MG: 20 INJECTION, SOLUTION INTRAVENOUS at 21:19

## 2023-03-03 RX ADMIN — SODIUM CHLORIDE 1000 ML: 0.9 INJECTION, SOLUTION INTRAVENOUS at 06:02

## 2023-03-03 RX ADMIN — Medication 62.5 MILLI-UNITS/MIN: at 08:18

## 2023-03-03 RX ADMIN — Medication 30 UNITS: at 07:20

## 2023-03-03 RX ADMIN — SODIUM CHLORIDE 999 ML/HR: 0.9 INJECTION, SOLUTION INTRAVENOUS at 06:22

## 2023-03-03 RX ADMIN — ROPIVACAINE HYDROCHLORIDE: 2 INJECTION, SOLUTION EPIDURAL; INFILTRATION at 06:52

## 2023-03-03 RX ADMIN — ROPIVACAINE HYDROCHLORIDE 5 ML: 2 INJECTION, SOLUTION EPIDURAL; INFILTRATION at 06:40

## 2023-03-03 RX ADMIN — IBUPROFEN 600 MG: 600 TABLET, FILM COATED ORAL at 22:16

## 2023-03-03 RX ADMIN — ACETAMINOPHEN 325MG 650 MG: 325 TABLET ORAL at 12:37

## 2023-03-03 RX ADMIN — BENZOCAINE AND LEVOMENTHOL 1 APPLICATION.: 200; 5 SPRAY TOPICAL at 10:28

## 2023-03-03 NOTE — ANESTHESIA PREPROCEDURE EVALUATION
Procedure:  LABOR ANALGESIA    Relevant Problems   GYN   (+) 37 weeks gestation of pregnancy      Endocrine   (+) Gestational diabetes        Physical Exam    Airway      TM Distance: >3 FB  Neck ROM: full     Dental       Cardiovascular  Cardiovascular exam normal    Pulmonary  Pulmonary exam normal     Other Findings        Anesthesia Plan  ASA Score- 2     Anesthesia Type- epidural with ASA Monitors  Additional Monitors:   Airway Plan:           Plan Factors-    Chart reviewed  Existing labs reviewed  Patient summary reviewed  Induction-     Postoperative Plan-     Informed Consent- Anesthetic plan and risks discussed with patient

## 2023-03-03 NOTE — L&D DELIVERY NOTE
DELIVERY NOTE  Jacobo Mendez 30 y o  female MRN: [id number]  Unit/Bed#: L&D 325-01 Encounter: [id number]    Obstetrician:    Dr Foster Lab  Assistant:   Dr Peter Yap MD    Pre-Delivery Diagnosis:   Patient Active Problem List   Diagnosis   • COVID-19 affecting pregnancy in second trimester   • Breast skin changes   • Maternal obesity affecting pregnancy, antepartum   • 38 weeks gestation of pregnancy   • Gestational diabetes         Post-Delivery Diagnosis:   Same as above - Delivered      Procedure:  Spontaneous vaginal delivery      Anesthesia:  epidural    Specimens:   Cord blood obtained   Placenta; normal appearing, central insertion, intact   Arterial and venous blood gases (below)     Gases:  Umbilical Cord Venous Blood Gas:  Results from last 7 days   Lab Units 23  0722   PH COV  7 399   PCO2 COV mm HG 31 3   HCO3 COV mmol/L 18 9   BASE EXC COV mmol/L -4 5*   O2 CT CD VB mL/dL 20 4   O2 HGB, VENOUS CORD % 03 0     Umbilical Cord Arterial Blood Gas:  Results from last 7 days   Lab Units 23  0722   PH COA  7 227*   PCO2 COA  49 9   PO2 COA mm HG 17 9   HCO3 COA mmol/L 20 3   BASE EXC COA mmol/L -7 6*   O2 CONTENT CORD ART ml/dl 6 7   O2 HGB, ARTERIAL CORD % 29 1       Quantitative Blood Loss:   785 mL           Complications:    Postpartum hemorrhage    Brief Description of Labor Course:  Jacobo Mendez is a 30 y o   female at 36w4d who was admitted to L&D for SROM  On initial cervical exam she was 5/80/-1 and thu every 5 minutes  She received an epidrual for analgesia and quickly progressed to complete cervical dilation  She progressed to complete at 0709, pushed for 9 min, and delivered a healthy  at 5  Description of Delivery:   With  the assistance of maternal expulsive forces, the fetal vertex delivered spontaneously  A nuchal cord was noted and delivered though   The anterior left shoulder was delivered atraumatically with gentle downward traction  The contralateral arm was delivered with gentle upward traction  The remainder of the fetus delivered spontaneously at 5, resulting in a viable male   Upon delivery, the infant was placed on the mothers abdomen and the cord was doubly clamped and cut after 30 seconds  The  was noted to have good tone and cry spontaneously  There was no evidence of injury  The  was passed off to  staff for evaluation  Umbilical cord blood and umbilical artery and venous gases were collected and sent to the lab  An intact placenta was delivered spontaneously at 0722 using fundal massage and gentle cord traction and was noted to have a centrally-inserted 3-vessel cord  Active management of the third stage of labor was undertaken with IV pitocin at 250milliunits/min  Inspection of the perineum, vagina, labia, cervix, and urethra revealed a none laceration  Bleeding was noted to be brisk  Pit was opened wide  A bimanual exam was performed and atony of the lower uterine segment was noted  Methergine was given  Bleeding was noted to slow with improve of uterine tone   Outcome:  Living  with APGARS 7 (1 min) and 8 (5 min)   weight: pending    At the conclusion of the delivery, all needle, sponge, and instrument counts were noted to be correct  Patient tolerated the procedure well and was allowed to recover in labor and delivery room with family and  before being transferred to the post-partum floor  Conclusion:  Mother and baby are currently recovering nicely in stable condition  Attending Supervision:   Dr Kiley Garcia was present for the entire procedure   Dr Shayne Fine was available in the immediate     Budcarol ann lAmaraz MD  OB/GYN PGY-1   3/3/2023 7:46 AM

## 2023-03-03 NOTE — ANESTHESIA PROCEDURE NOTES
Epidural Block    Patient location during procedure: OB  Start time: 3/3/2023 6:39 AM  Reason for block: at surgeon's request and primary anesthetic  Staffing  Performed: Anesthesiologist   Anesthesiologist: Carlo Martinez MD  Preanesthetic Checklist  Completed: patient identified, IV checked, site marked, risks and benefits discussed, surgical consent, monitors and equipment checked, pre-op evaluation and timeout performed  Epidural  Patient position: sitting  Prep: Betadine  Patient monitoring: frequent blood pressure checks  Approach: midline  Location: lumbar  Injection technique: THIERRY saline  Needle  Needle type: Tuohy   Needle gauge: 18 G  Catheter type: side hole  Catheter size: 20 G  Catheter securement method: clear occlusive dressing  Test dose: negative  Assessment  Sensory level: T10  Number of attempts: 1negative aspiration for CSF, negative aspiration for heme and no paresthesia on injection  patient tolerated the procedure well with no immediate complications

## 2023-03-03 NOTE — DISCHARGE SUMMARY
Discharge Summary - Myrna Bobo 30 y o  female MRN: [id number]    Unit/Bed#: L&D 325-01 Encounter: [id number]    Admission Date: 3/3/2023     Discharge Date: 3/4/2023    Patient Active Problem List   Diagnosis   • COVID-19 affecting pregnancy in second trimester   • Breast skin changes   • Maternal obesity affecting pregnancy, antepartum   • 37 weeks gestation of pregnancy   • Gestational diabetes         OBGYN Practice:  [facility] Course:     Myrna Bobo is a 30 y o  Risa Venegas who was admitted at 38w2d for SROM  On initial cervical exam she was 5/80/-1 and thu every 5 minutes  She received an epidrual for analgesia and quickly progressed to complete cervical dilation  She delivered a viable male  on 3/3/2023 at 0719  Weight 8lbs 3 2oz via spontaneous vaginal delivery  Apgars were 7 (1 min) and 8 (5 min)   was transferred to  nursery  Delivery was complicated by lower uterine segment atony without post partum hemmohrage  She received methergine IM and an additional 8 hour bag of pitocin  Patient tolerated the procedure well and was transferred to recovery in stable condition  She had a CBC drawn 12 hours post partum and her hemoglobin was found to be 8 0 (9 7 on admission) and so she received a dose of venfoer  The remainder of her post-partum course was uncomplicated  Her post-partum pain was well controlled with oral analgesics  On day of discharge, she was ambulating and able to reasonably perform all ADLs  She was voiding and had appropriate bowel function  Pain was well controlled  She was discharged home on postpartum day #1 without complications  Patient was instructed to follow up with her OB as an outpatient and was given appropriate warnings to call doctor or provider if she develops signs of infection or uncontrolled pain      On day of discharge she was ambulating, voiding spontaneously, tolerating oral intake and hemodynamically stable  Mom's blood type is A positive and  Rhogam was not given  Disposition: Home    Planned Readmission: No    Discharge Medications:   Please see AVS    Discharge instructions :   -Do not place anything (no partner, tampons or douche) in your vagina for 6 weeks  -You may walk for exercise for the first 6 weeks then gradually return to your usual activities    -Please do not drive for 1 week if you have no stitches and for 2 weeks if you have stitches or underwent a  delivery     -You may take baths or shower per your preference    -Please look at your bust (breasts) in the mirror daily and call your doctor for redness or tenderness or increased warmth  - If you have had a  section please look at your incision daily as well and call provider for increasing redness or steady drainage from the incision    -Please call your doctor's office if temperature > 100 4*F or 38* C, worsening pain or a foul discharge      Follow Up:  - Follow up in 3 weeks for postpartum visit    Maureen Barrientos MD

## 2023-03-03 NOTE — H&P
[address] [place] [address] 30 y o  female MRN: [id number]  Unit/Bed#: L&D 305-01 Encounter: [id number]    Assessment: 30 y o  Ruma Apa at 38w2d admitted for SROM vs PROM, pending 2h recheck  SVE: /-1  FHT: 130s, reactive  Clinical EFW: 7lb ; Vertex confirmed by US  GBS status: negative   Postpartum contraception plan: undecided    Plan:   · Admit  · CBC, RPR, Type & Screen  · Analgesia at maternal request  · Expectant management   · Plan for 2h recheck and start pitocin if unchanged    Dr Tyesha Bolaños aware      SUBJECTIVE:    Chief Complaint: leaking fluid    HPI: Dilip Oconnell is a 30 y o  Ruma Apa with an SHAWN of 3/15/2023, by Patient Reported at 38w2d who is being admitted for SROM vs PROM  She broke her water at 0410 am and it was clear  She complains of uterine contractions, occurring every few minutes, has moderate LOF, and reports no VB  She states she has felt good   Shelly Silva     Pregnancy complications: AMA, F0HXD    Baby complications/comments: none    Patient Active Problem List   Diagnosis   • COVID-19 affecting pregnancy in second trimester   • Breast skin changes   • Maternal obesity affecting pregnancy, antepartum   •  (spontaneous vaginal delivery)   • Gestational diabetes       OB History    Para Term  AB Living   2 2 2     2   SAB IAB Ectopic Multiple Live Births         0 2      # Outcome Date GA Lbr Jerman/2nd Weight Sex Delivery Anes PTL Lv   2 Term 23 38w2d / 00:10 3720 g (8 lb 3 2 oz) M Vag-Spont EPI  QUAN   1 Term 21 39w0d / 00:29 3410 g (7 lb 8 3 oz) M Vag-Spont Local N QUAN       Past Medical History:   Diagnosis Date   • Anemia    • Anxiety    • Gestational diabetes 2023       Past Surgical History:   Procedure Laterality Date   • WISDOM TOOTH EXTRACTION         Social History     Tobacco Use   • Smoking status: Never   • Smokeless tobacco: Never   Substance Use Topics   • Alcohol use: Not Currently       No Known Allergies    No medications prior to admission  OBJECTIVE:  Vitals:    76  Pulse: 75  RR: 16  Body mass index is 32 42 kg/m²  Physical Exam:  Physical Exam  Constitutional:       Appearance: She is well-developed  HENT:      Head: Normocephalic and atraumatic  Eyes:      Extraocular Movements: Extraocular movements intact  Conjunctiva/sclera: Conjunctivae normal    Cardiovascular:      Rate and Rhythm: Normal rate and regular rhythm  Heart sounds: Normal heart sounds  Pulmonary:      Effort: Pulmonary effort is normal       Breath sounds: Normal breath sounds  Abdominal:      Comments: Gravid   Neurological:      Mental Status: She is alert and oriented to person, place, and time  Skin:     General: Skin is warm     Psychiatric:         Mood and Affect: Mood normal          Behavior: Behavior normal               Lab Results   Component Value Date    WBC 9 23 03/04/2023    HGB 7 7 (L) 03/04/2023    HCT 24 6 (L) 03/04/2023     03/04/2023     No results found for: NA, K, CL, CO2, BUN, CREATININE, GLUCOSE, AST, ALT    Prenatal Labs   Blood type: A+  Antibody: negative  Group B strep: negative  HIV: negative  Hepatitis B: negative  RPR: non-reactive  Rubella: Immune  Varicella: Immune  1 hour Glucose: 178  3 hour glucose: 55      >2 Midnights  INPATIENT       Siddharth Perea MD  PGY-3 OB/GYN   3/5/2023 7:33 PM

## 2023-03-03 NOTE — LACTATION NOTE
This note was copied from a baby's chart  CONSULT - LACTATION  Baby Boy Elissa Mazariegos) Kaminer 0 days male MRN: [id number]    [address] [facility] NURSERY Room / Bed: L&D 325(N)/L&D 325(N) Encounter: [id number]    Maternal Information     MOTHER:  Marilia Ruvalcaba  Maternal Age: 30 y o    OB History: # 1 - Date: 21, Sex: Male, Weight: 3410 g (7 lb 8 3 oz), GA: 39w0d, Delivery: Vaginal, Spontaneous, Apgar1: 8, Apgar5: 9, Living: Living, Birth Comments: None    # 2 - Date: None, Sex: None, Weight: None, GA: None, Delivery: None, Apgar1: None, Apgar5: None, Living: None, Birth Comments: None   Previouse breast reduction surgery? No    Lactation history:   Has patient previously breast fed: Yes   How long had patient previously breast fed: "not too long"   Previous breast feeding complications: Other (Comment) (Latching baby)     Past Surgical History:   Procedure Laterality Date   • WISDOM TOOTH EXTRACTION          Birth information:  YOB: 2021   Time of birth: 7:19 AM   Sex: male   Delivery type:     Birth Weight: No birth weight on file  Percent of Weight Change: Birth weight not on file     Gestational Age: 36w4d   [unfilled]    Assessment     Breast and nipple assessment: large breast    [place] Assessment: normal assessment    Feeding assessment: feeding well with guidance     LATCH:  Latch: Grasps breast, tongue down, lips flanged, rhythmic sucking   Audible Swallowing: A few with stimulation   Type of Nipple: Everted (After stimulation)   Comfort (Breast/Nipple): Soft/non-tender   Hold (Positioning): Partial assist, teach one side, mother does other, staff holds   [facility] Score: 8          Feeding recommendations:  breast feed on demand     Met with mother  Provided with Ready, Set, Baby booklet which contained information on:  Hand expression with access to QR codes to review hand expression    Positioning and latch reviewed as well as showing images of other feeding positions  Discussed the properties of a good latch in any position  Baby just finished nursing on left breast as per Mom  Baby remains with hunger cues with shallow latch on right using cradle hold  Helped position baby in football hold to support hand dominance and deeper latch  Baby quickly guided to deep latch  Stimulated to suck converting to rocker suckling  Mom is able to maintain latch  Mom seems pleaseed with latch assistance and booklets  Feeding on cue and what that means for recognizing infant's hunger, s/s that baby is getting enough milk and some s/s that breastfeeding dyad may need further help No family at bedside at this time  Skin to Skin contact an benefits to mom and baby  Avoidance of pacifiers for the first month discussed  Gave information on common concerns, what to expect the first few weeks after delivery, preparing for other caregivers, and how partners can help  Resources for support also provided  Also reviewed discharge breastfeeding booklet including the feeding log  Emphasized 8 or more (12) feedings in a 24 hour period, what to expect for the number of diapers per day of life and the progression of properties of the  stooling pattern  Reviewed breastfeeding and your lifestyle, storage and preparation of breast milk, how to keep you breast pump clean, the employed breastfeeding mother and paced bottle feeding handouts  Booklet included Breastfeeding Resources for after discharge including access to the number for the [address]  [place]  to call for assistance, questions and concerns  Extension number for inpatient lactation support provided            Mu Townsend RN 3/3/2023 9:28 AM

## 2023-03-03 NOTE — ANESTHESIA POSTPROCEDURE EVALUATION
Post-Op Assessment Note    CV Status:  Stable    Pain management: adequate     Mental Status:  Awake   Hydration Status:  Stable   PONV Controlled:  Controlled   Airway Patency:  Patent      Post Op Vitals Reviewed: Yes      Staff: Anesthesiologist     Post-op block assessment: catheter intact and no complications      No notable events documented      BP      Temp      Pulse     Resp      SpO2      /63   Pulse 68   Temp 98 4 °F (36 9 °C) (Oral)   Resp 16   Ht 5' 7" (1 702 m)   BMI 33 99 kg/m²

## 2023-03-04 VITALS
HEART RATE: 69 BPM | HEIGHT: 67 IN | BODY MASS INDEX: 32.49 KG/M2 | DIASTOLIC BLOOD PRESSURE: 65 MMHG | TEMPERATURE: 97.9 F | OXYGEN SATURATION: 98 % | SYSTOLIC BLOOD PRESSURE: 112 MMHG | WEIGHT: 207 LBS | RESPIRATION RATE: 16 BRPM

## 2023-03-04 LAB
ERYTHROCYTE [DISTWIDTH] IN BLOOD BY AUTOMATED COUNT: 14.1 % (ref 11.6–15.1)
HCT VFR BLD AUTO: 24.6 % (ref 34.8–46.1)
HGB BLD-MCNC: 7.7 G/DL (ref 11.5–15.4)
MCH RBC QN AUTO: 27.6 PG (ref 26.8–34.3)
MCHC RBC AUTO-ENTMCNC: 31.3 G/DL (ref 31.4–37.4)
MCV RBC AUTO: 88 FL (ref 82–98)
PLATELET # BLD AUTO: 312 THOUSANDS/UL (ref 149–390)
PMV BLD AUTO: 10.3 FL (ref 8.9–12.7)
RBC # BLD AUTO: 2.79 MILLION/UL (ref 3.81–5.12)
WBC # BLD AUTO: 9.23 THOUSAND/UL (ref 4.31–10.16)

## 2023-03-04 RX ORDER — DIAPER,BRIEF,INFANT-TODD,DISP
1 EACH MISCELLANEOUS DAILY PRN
Qty: 30 G | Refills: 0 | Status: CANCELLED
Start: 2023-03-04

## 2023-03-04 RX ORDER — IBUPROFEN 600 MG/1
600 TABLET ORAL EVERY 6 HOURS
Qty: 30 TABLET | Refills: 0 | Status: SHIPPED | OUTPATIENT
Start: 2023-03-04

## 2023-03-04 RX ORDER — ACETAMINOPHEN AND CODEINE PHOSPHATE 120; 12 MG/5ML; MG/5ML
1 SOLUTION ORAL DAILY
Qty: 28 TABLET | Refills: 0 | Status: SHIPPED | OUTPATIENT
Start: 2023-03-04

## 2023-03-04 RX ADMIN — ACETAMINOPHEN 325MG 650 MG: 325 TABLET ORAL at 03:09

## 2023-03-04 RX ADMIN — DOCUSATE SODIUM 100 MG: 100 CAPSULE, LIQUID FILLED ORAL at 08:38

## 2023-03-04 RX ADMIN — IBUPROFEN 600 MG: 600 TABLET, FILM COATED ORAL at 10:41

## 2023-03-04 RX ADMIN — IBUPROFEN 600 MG: 600 TABLET, FILM COATED ORAL at 04:07

## 2023-03-04 RX ADMIN — ACETAMINOPHEN 325MG 650 MG: 325 TABLET ORAL at 07:33

## 2023-03-04 NOTE — ASSESSMENT & PLAN NOTE
Recovering well   Encourage Ambulation  Encourage breastfeeding  GBS -  Rh +   Lower Uterine segment atony at time of delivery  12 hour Hb 8 0, s/p venofer, f/u AM CBC

## 2023-03-04 NOTE — CASE MANAGEMENT
Case Management Assessment    Patient name Jose Cee  Location L&D 305/L&D 688-58 MRN [id number]  : 1985 Date 3/4/2023       Current Admission Date: 3/3/2023  Current Admission Diagnosis: (spontaneous vaginal delivery)   Patient Active Problem List    Diagnosis Date Noted   •  (spontaneous vaginal delivery) 2023   • Gestational diabetes 2023   • Maternal obesity affecting pregnancy, antepartum 2021   • Breast skin changes 2021   • COVID-19 affecting pregnancy in second trimester 2021      LOS (days): 1  Geometric Mean LOS (GMLOS) (days):   Days to GMLOS:     OBJECTIVE:    Risk of Unplanned Readmission Score: 4 33         Current admission status: Inpatient       Preferred Pharmacy:   [facility]/pharmacy #[id number]  [address] [facility] [address]  Phone: [phone] Fax: [address] [place] [address] 60 ,  [facility] [address] [facility] [address]  Phone: [phone] Fax: [phone] [address] [place] [address], [address] [facility] [address] [place] [address]  Phone: [phone] Fax: [phone]    Primary Care Provider: KINJAL Holbrook    Primary Insurance: Abbie Romero  Secondary Insurance: [address] [place] [address]    ASSESSMENT:  Active Health Care Proxies    There are no active Health Care Proxies on file  CM consulted for PPD score of 14  CM met w/ MOB who provided the following information:    1  Current mental health diagnosis: Anxiety  2  SI/HI: none  3  Currently receiving mental health treatment: Yes  4  Currently taking any medication: Zoloft  a  If so, who is the prescribing provider: Therapist   5  History of PPD: None  6  OBGYN: n/a  7  Support system:, family, friends, ect  8  Interested in resources: no    No other needs identified at this time   CM encouraged MOB to reach out with any further questions or concerns

## 2023-03-04 NOTE — PLAN OF CARE
Problem: POSTPARTUM  Goal: Experiences normal postpartum course  Description: INTERVENTIONS:  - Monitor maternal vital signs  - Assess uterine involution and lochia  3/4/2023 0742 by Vikki Leger RN  Outcome: Progressing  3/3/2023 1758 by Vikki Leger RN  Outcome: Progressing  Goal: Appropriate maternal -  bonding  Description: INTERVENTIONS:  - Identify family support  - Assess for appropriate maternal/infant bonding   -Encourage maternal/infant bonding opportunities  - Referral to  or  as needed  3/4/2023 [phone] by Vikki Leger RN  Outcome: Progressing  3/3/2023 1758 by Vikki Leger RN  Outcome: Progressing  Goal: Establishment of infant feeding pattern  Description: INTERVENTIONS:  - Assess breast/bottle feeding  - Refer to lactation as needed  3/4/2023 0742 by Vikki Leger RN  Outcome: Progressing  3/3/2023 1758 by Vikki Leger RN  Outcome: Progressing  Goal: Incision(s), wounds(s) or drain site(s) healing without S/S of infection  Description: INTERVENTIONS  - Assess and document dressing, incision, wound bed, drain sites and surrounding tissue  - Provide patient and family education  3/4/2023 0742 by Vikki Leger RN  Outcome: Progressing  3/3/2023 1758 by Vikki Leger RN  Outcome: Progressing

## 2023-03-04 NOTE — LACTATION NOTE
This note was copied from a baby's chart  Mother verbalized breastfeeding is going better  C/O sore nipples  Information given about sore nipples and how to correct with positioning techniques  Discussed maneuvers to latch infant on properly to avoid nipple pain and promote healing  Discussed treatments that could be utilized to promote healing  Hydro gel dressings given with instruction and verbalization of understanding of cleaning and duration of use  Worked on positioning infant up at chest level and starting to feed infant with nose arriving at the nipple  Then, using areolar compression to achieve a deep latch that is comfortable and exchanges optimum amounts of milk  Encouraged to call for latch assistance  Dad is supportive at bedside  Enc to call for assistance as needed,phone # given

## 2023-03-04 NOTE — PROGRESS NOTES
Progress Note - OB/GYN  Myrna Bobo 30 y o  female MRN: [id number]  Unit/Bed#: L&D 305-01 Encounter: [id number]    Assessment and Plan     Myrna Bobo is a patient of: [facility]  She is PPD# 1 s/p  spontaneous vaginal delivery  Recovering well and is stable       *  (spontaneous vaginal delivery)  Assessment & Plan  Recovering well   Encourage Ambulation  Encourage breastfeeding  GBS -  Rh +   Lower Uterine segment atony at time of delivery  12 hour Hb 8 0, s/p venofer, f/u AM CBC      Disposition    - Anticipate discharge home on PPD# 1      Subjective/Objective     Chief Complaint: Postpartum State     Subjective:    Myrna Score is PPD#1 s/p  spontaneous vaginal delivery  She has no current complaints  Pain is well controlled  Patient is currently voiding  She is ambulating  Patient is currently passing flatus and has had no bowel movement  She is tolerating PO, and denies nausea or vomitting  Patient denies fever, chills, chest pain, shortness of breath, or calf tenderness  Lochia is normal  She is  Breastfeeding  She is recovering well and is stable  She desires discharge today if she and baby are both cleared  Vitals:   /59 (BP Location: Right arm)   Pulse 71   Temp 98 2 °F (36 8 °C) (Temporal)   Resp 16   Ht 5' 7" (1 702 m)   Wt 93 9 kg (207 lb)   SpO2 98%   Breastfeeding Yes   BMI 32 42 kg/m²       Intake/Output Summary (Last 24 hours) at 3/4/2023 0530  Last data filed at 3/3/2023 1915  Gross per 24 hour   Intake --   Output 1185 ml   Net -1185 ml       Invasive Devices     Peripheral Intravenous Line  Duration           Peripheral IV 23 Dorsal (posterior); Left Hand <1 day                Physical Exam:   GEN: Myrna Bobo appears well, alert and oriented x 3, pleasant and cooperative   CARDIO: RRR, no murmurs or rubs  RESP:  CTAB, no wheezes or rales  ABDOMEN: soft, no tenderness, no distention, fundus @ U-2  EXTREMITIES: non tender, no erythema  Labs:     Hemoglobin   Date Value Ref Range Status   03/03/2023 8 0 (L) 11 5 - 15 4 g/dL Final   03/03/2023 9 8 (L) 11 5 - 15 4 g/dL Final     WBC   Date Value Ref Range Status   03/03/2023 11 65 (H) 4 31 - 10 16 Thousand/uL Final   03/03/2023 8 08 4 31 - 10 16 Thousand/uL Final     Platelets   Date Value Ref Range Status   03/03/2023 340 149 - 390 Thousands/uL Final   03/03/2023 346 149 - 390 Thousands/uL Final          Hyacinth Baker MD  3/4/2023  5:30 AM

## 2023-03-11 LAB — PLACENTA IN STORAGE: NORMAL

## 2023-03-14 ENCOUNTER — TELEPHONE (OUTPATIENT)
Dept: PERINATAL CARE | Facility: CLINIC | Age: 38
End: 2023-03-14

## 2023-03-14 DIAGNOSIS — Z13.1 DIABETES MELLITUS SCREENING: Primary | ICD-10-CM

## 2023-07-28 ENCOUNTER — OFFICE VISIT (OUTPATIENT)
Dept: URGENT CARE | Facility: CLINIC | Age: 38
End: 2023-07-28
Payer: COMMERCIAL

## 2023-07-28 VITALS
DIASTOLIC BLOOD PRESSURE: 83 MMHG | RESPIRATION RATE: 18 BRPM | TEMPERATURE: 99.4 F | SYSTOLIC BLOOD PRESSURE: 134 MMHG | OXYGEN SATURATION: 97 % | HEART RATE: 78 BPM

## 2023-07-28 DIAGNOSIS — R09.82 PND (POST-NASAL DRIP): Primary | ICD-10-CM

## 2023-07-28 PROCEDURE — G0382 LEV 3 HOSP TYPE B ED VISIT: HCPCS | Performed by: FAMILY MEDICINE

## 2023-07-28 RX ORDER — NORGESTIMATE AND ETHINYL ESTRADIOL 0.25-0.035
KIT ORAL
COMMUNITY
Start: 2023-07-12

## 2023-07-28 NOTE — PROGRESS NOTES
[facility]    NAME: Celeste Fernandez is a 41 y.o. female  : 1985    MRN: [id number]  DATE: 2023  TIME: 6:59 PM    Assessment and Plan   PND (post-nasal drip) [R09.82]  1. PND (post-nasal drip)          Advised the patient to start sinus rinse in addition to Zyrtec once daily  May consider Flonase if needed  Provided patient with precautionary measures    Patient Instructions   There are no Patient Instructions on file for this visit. Follow up with PCP in 3-5 days. Proceed to ER if symptoms worsen. Chief Complaint     Chief Complaint   Patient presents with   • Earache     Patient here with right ear pain and dry throat, more-so on the right side of her throat. Patient states last time she felt this way she had a bad ear infection. History of Present Illness   Earache   There is pain in the right ear. This is a new problem. The current episode started yesterday. The problem has been unchanged. There has been no fever. The pain is mild. Associated symptoms include rhinorrhea and a sore throat. Pertinent negatives include no coughing, ear discharge, headaches, hearing loss or rash. She has tried nothing for the symptoms. There is no history of a chronic ear infection or hearing loss. Review of Systems   Review of Systems   HENT: Positive for ear pain, rhinorrhea and sore throat. Negative for ear discharge and hearing loss. Respiratory: Negative for cough. Skin: Negative for rash. Neurological: Negative for headaches. All other systems reviewed and are negative. The following portions of the patient's history were reviewed and updated as appropriate: allergies, current medications, past family history, past medical history, past social history, past surgical history and problem list.     Medications have been verified. Objective   /83   Pulse 78   Temp 99.4 °F (37.4 °C)   Resp 18   SpO2 97%     Physical Exam  Vitals reviewed.    Constitutional: General: She is not in acute distress. Appearance: Normal appearance. She is well-developed. She is not ill-appearing, toxic-appearing or diaphoretic. HENT:      Head: Normocephalic and atraumatic. Right Ear: Ear canal and external ear normal. No middle ear effusion. There is no impacted cerumen. Tympanic membrane is bulging. Tympanic membrane is not erythematous. Left Ear: Ear canal and external ear normal.  No middle ear effusion. There is no impacted cerumen. Tympanic membrane is bulging. Tympanic membrane is not erythematous. Nose: Rhinorrhea present. No congestion. Comments: Bilaterally inflamed nasal turbinates     Mouth/Throat:      Mouth: Mucous membranes are moist.      Pharynx: Posterior oropharyngeal erythema present. No oropharyngeal exudate. Comments: Cobblestoning noted  Eyes:      General:         Right eye: No discharge. Left eye: No discharge. Extraocular Movements: Extraocular movements intact. Conjunctiva/sclera: Conjunctivae normal.      Pupils: Pupils are equal, round, and reactive to light. Cardiovascular:      Rate and Rhythm: Normal rate. Pulmonary:      Effort: Pulmonary effort is normal. No respiratory distress. Breath sounds: Normal breath sounds. No wheezing or rales. Musculoskeletal:      Cervical back: Normal range of motion. Lymphadenopathy:      Cervical: No cervical adenopathy. Neurological:      Mental Status: She is alert.        Britni Askew MD

## 2023-11-14 ENCOUNTER — OFFICE VISIT (OUTPATIENT)
Dept: FAMILY MEDICINE CLINIC | Facility: CLINIC | Age: 38
End: 2023-11-14
Payer: COMMERCIAL

## 2023-11-14 VITALS
SYSTOLIC BLOOD PRESSURE: 112 MMHG | WEIGHT: 190.4 LBS | OXYGEN SATURATION: 100 % | HEART RATE: 85 BPM | TEMPERATURE: 97.2 F | HEIGHT: 67 IN | DIASTOLIC BLOOD PRESSURE: 82 MMHG | BODY MASS INDEX: 29.88 KG/M2

## 2023-11-14 DIAGNOSIS — Z13.29 SCREENING FOR THYROID DISORDER: ICD-10-CM

## 2023-11-14 DIAGNOSIS — E78.2 MIXED HYPERLIPIDEMIA: ICD-10-CM

## 2023-11-14 DIAGNOSIS — Z76.89 ESTABLISHING CARE WITH NEW DOCTOR, ENCOUNTER FOR: Primary | ICD-10-CM

## 2023-11-14 DIAGNOSIS — Z86.2 HISTORY OF ANEMIA: ICD-10-CM

## 2023-11-14 DIAGNOSIS — Z12.4 SCREENING FOR CERVICAL CANCER: ICD-10-CM

## 2023-11-14 DIAGNOSIS — Z30.41 SURVEILLANCE FOR BIRTH CONTROL, ORAL CONTRACEPTIVES: ICD-10-CM

## 2023-11-14 DIAGNOSIS — E53.8 VITAMIN B 12 DEFICIENCY: ICD-10-CM

## 2023-11-14 DIAGNOSIS — R09.81 NASAL CONGESTION: ICD-10-CM

## 2023-11-14 DIAGNOSIS — Z86.32 HISTORY OF GESTATIONAL DIABETES: ICD-10-CM

## 2023-11-14 DIAGNOSIS — E55.9 VITAMIN D DEFICIENCY DISEASE: ICD-10-CM

## 2023-11-14 PROBLEM — F41.9 ANXIETY AND DEPRESSION: Status: ACTIVE | Noted: 2018-05-07

## 2023-11-14 PROBLEM — F32.A ANXIETY AND DEPRESSION: Status: ACTIVE | Noted: 2018-05-07

## 2023-11-14 PROCEDURE — 99204 OFFICE O/P NEW MOD 45 MIN: CPT | Performed by: NURSE PRACTITIONER

## 2023-11-14 RX ORDER — NORETHINDRONE ACETATE AND ETHINYL ESTRADIOL, ETHINYL ESTRADIOL AND FERROUS FUMARATE 1MG-10(24)
1 KIT ORAL DAILY
COMMUNITY
Start: 2023-10-09 | End: 2023-11-14 | Stop reason: SDUPTHER

## 2023-11-14 RX ORDER — NORETHINDRONE ACETATE AND ETHINYL ESTRADIOL, ETHINYL ESTRADIOL AND FERROUS FUMARATE 1MG-10(24)
1 KIT ORAL DAILY
Qty: 28 TABLET | Refills: 0 | Status: SHIPPED | OUTPATIENT
Start: 2023-11-14

## 2023-11-14 RX ORDER — FLUTICASONE PROPIONATE 50 MCG
1 SPRAY, SUSPENSION (ML) NASAL DAILY
Qty: 18.2 ML | Refills: 1 | Status: SHIPPED | OUTPATIENT
Start: 2023-11-14

## 2023-11-14 NOTE — ASSESSMENT & PLAN NOTE
Patient with history of high lipids. Was not able to be managed previously due to pregnancy. Advised to obtain blood work as ordered.

## 2023-11-14 NOTE — PATIENT INSTRUCTIONS
Allergic Rhinitis   AMBULATORY CARE:   Allergic rhinitis , or hay fever, is swelling inside your nose caused by an allergen. An allergen can be anything that causes an allergic reaction. Allergies to weeds, grass, trees, or mold often cause seasonal allergic rhinitis. Indoor dust mites or pet dander can also cause allergic rhinitis. Common signs and symptoms:   Sneezing, coughing, or clearing your throat often    Runny, stuffy, or itchy nose    A sore or scratchy throat    Red, itchy, watery eyes    Severe tiredness    Dark circles under your eyes    Rash or hives    Headache    Postnasal drip (nasal drainage down the back of your throat)    Call your local emergency number (911 in the [address]) or have someone call if:  You have any of the following signs or symptoms of an anaphylactic reaction:  You feel itchy, or you have a rash, or hives that have spread over your body. You have trouble breathing, swelling in your mouth or throat, or wheezing. You feel you are going to faint. Call your doctor if:   You have a fever. Your symptoms get worse, even after treatment. You have trouble sleeping because of your symptoms. You have questions or concerns about your condition or care. Treatment:  You may need any of the following:  Medicines  may help decrease your symptoms. Examples include antihistamines, decongestants, and certain steroids or asthma medicines. These may come as a pill, eye drops, nasal spray, or a tablet to put under your tongue. Allergy shots , or immunotherapy, may be needed if your symptoms are severe or other treatments do not work. At first, tiny amounts of an allergen are injected into your skin. The amount of allergen is slowly increased over time. This may help your body be less sensitive to the allergen and stop reacting to it. You may need allergy shots for weeks or longer. Manage allergic rhinitis:   Rinse your nose and sinuses  with a salt water spray or solution. This will help thin the mucus and decrease swelling in your nose. This will also rinse away pollen and dirt. Ask your provider how often to rinse your nose. Use over-the-counter eye drops if you have red, itchy eyes. Ask what eye drops you should use and how often you should use them. Do not smoke and avoid secondhand smoke. Nicotine and other chemicals in cigarettes and cigars can cause lung damage. Ask your healthcare provider for information if you currently smoke and need help to quit. E-cigarettes or smokeless tobacco still contain nicotine. Talk to your healthcare provider before you use these products. Prevent an allergic reaction: The best way to prevent an allergic reaction is to avoid allergens as much as possible. Any of the following may help decrease your symptoms:  Decrease exposure to dust mites. Wash sheets, towels, and blankets in hot water regularly. Cover your pillows and mattresses with allergen-free covers. Store clothes in closets with doors or closed drawers. Vacuum often. Remove carpets and curtains if possible. These collect dust and dust mites. Decrease exposure to pollen. Stay inside when air pollution or the pollen count is high. Use an air conditioner and keep windows and doors closed. Add a filter designed for allergies if possible. Wash your hair before bed every night to rinse away pollen. Decrease exposure to pet dander. If you have pets, try to keep them out of bedrooms and carpeted areas. Bathe pets often, if appropriate. Decrease exposure to mold. Limit the time you spend in basements. Do not have standing water in your home or yard. Follow up with your doctor as directed: You may need to see an allergy specialist if you need help to control your symptoms. Write down your questions so you remember to ask them during your visits.    © Copyright [employer] 2023 Information is for End User's use only and may not be sold, redistributed or otherwise used for commercial purposes. The above information is an  only. It is not intended as medical advice for individual conditions or treatments. Talk to your doctor, nurse or pharmacist before following any medical regimen to see if it is safe and effective for you.

## 2023-11-14 NOTE — PROGRESS NOTES
Name: Rohit Mackey      : 1985      MRN: [id number]  Encounter Provider: KINJAL Henriquez  Encounter Date: 2023   Encounter department: [address] [place] [address]     1. Establishing care with new doctor, encounter for    2. Nasal congestion  Comments:  Advised increase hydration, saline rinses twice daily, steam, humidified air, Vicks at night, Flonase as prescribed  Orders:  -     fluticasone (FLONASE) 50 mcg/act nasal spray; 1 spray into each nostril daily    3. Mixed hyperlipidemia  Assessment & Plan:  Patient with history of high lipids. Was not able to be managed previously due to pregnancy. Advised to obtain blood work as ordered. Orders:  -     Lipid panel; Future    4. Vitamin B 12 deficiency  Assessment & Plan: To recheck levels as ordered. Orders:  -     Vitamin B12; Future    5. Vitamin D deficiency disease  Assessment & Plan: To recheck levels as ordered. Orders:  -     Vitamin D 25 hydroxy; Future    6. History of gestational diabetes  Comments:  Was diagnosed a few weeks prior to delivery. Denies symptoms related to elevated blood sugars, to obtain blood work as ordered. Orders:  -     CBC and differential; Future  -     Comprehensive metabolic panel; Future  -     Hemoglobin A1C; Future; Expected date: 2023    7. Screening for thyroid disorder  -     TSH, 3rd generation with Free T4 reflex; Future    8. History of anemia  -     CBC and differential; Future  -     Iron Panel (Includes Ferritin, Iron Sat%, Iron, and TIBC); Future  -     Folate; Future    9. Surveillance for birth control, oral contraceptives  Comments:  Advised will provide courtesy refill until she gets established with local OB/GYN. Orders:  -     Lo Loestrin Fe 1 MG-10 MCG / 10 MCG TABS; Take 1 tablet by mouth daily    10. Screening for cervical cancer  -     Ambulatory Referral to Obstetrics / Gynecology;  Future      BMI Counseling: Body mass index is 29.82 kg/m². The BMI is above normal. Nutrition recommendations include decreasing portion sizes, encouraging healthy choices of fruits and vegetables, consuming healthier snacks, limiting drinks that contain sugar, moderation in carbohydrate intake, increasing intake of lean protein, reducing intake of saturated and trans fat and reducing intake of cholesterol. Exercise recommendations include exercising 3-5 times per week and strength training exercises. No pharmacotherapy was ordered. Rationale for BMI follow-up plan is due to patient being overweight or obese. Depression Screening and Follow-up Plan: Patient was screened for depression during today's encounter. They screened negative with a PHQ-2 score of 0. Subjective      Patient presents to the office for establishment of care. Patient previously seen by [facility] family practice and OB/GYN in [facility]. Patient is now in [place] and is seeking care to local providers. Patient with history of gestational diabetes, hyperlipidemia, anxiety and depression. Stational diabetes and hyperlipidemia were being monitored and were not treated with medication management. Patient currently on sertraline for anxiety and depression. Patient delivered 3/3/2023 and has not had follow-up blood work since delivery. Patient currently denies chest pain, shortness of breath, palpitations. Review of Systems   Constitutional:  Negative for chills, fatigue and fever. HENT:  Positive for congestion and postnasal drip. Negative for ear pain, rhinorrhea, sinus pressure, sinus pain, sore throat and trouble swallowing. Eyes:  Negative for photophobia and visual disturbance. Respiratory:  Negative for cough and shortness of breath. Cardiovascular:  Negative for chest pain and palpitations. Gastrointestinal:  Negative for nausea and vomiting. Endocrine: Negative for polydipsia, polyphagia and polyuria. Genitourinary:  Negative for decreased urine volume. Musculoskeletal:  Negative for arthralgias and myalgias. Skin:  Negative for color change and rash. Neurological:  Negative for dizziness, weakness, light-headedness and headaches. Psychiatric/Behavioral:  Negative for confusion and dysphoric mood. The patient is not nervous/anxious.         Current Outpatient Medications on File Prior to Visit   Medication Sig    sertraline (ZOLOFT) 100 mg tablet Take 50 mg by mouth daily    [DISCONTINUED] Lo Loestrin Fe 1 MG-10 MCG / 10 MCG TABS Take 1 tablet by mouth daily    [DISCONTINUED] acetaminophen (TYLENOL) 325 mg tablet Take 2 tablets (650 mg total) by mouth every 4 (four) hours as needed for mild pain, moderate pain, severe pain, headaches or fever (Patient not taking: Reported on 7/28/2023)    [DISCONTINUED] Blood Glucose Monitoring Suppl (OneTouch Verio Flex System) w/Device KIT Test 4 times daily    [DISCONTINUED] docusate sodium (COLACE) 100 mg capsule Take 1 capsule (100 mg total) by mouth 2 (two) times a day (Patient not taking: Reported on 7/28/2023)    [DISCONTINUED] ibuprofen (MOTRIN) 600 mg tablet Take 1 tablet (600 mg total) by mouth every 6 (six) hours (Patient not taking: Reported on 7/28/2023)    [DISCONTINUED] Maggie 0.25-35 MG-MCG per tablet TAKE 1 TABLET BY MOUTH EVERY DAY FOR 84 DAYS    [DISCONTINUED] norethindrone (Jacy) 0.35 MG tablet Take 1 tablet (0.35 mg total) by mouth daily (Patient not taking: Reported on 7/28/2023)    [DISCONTINUED] witch hazel-glycerin (TUCKS) topical pad Apply 1 pad topically every 4 (four) hours as needed for irritation (Patient not taking: Reported on 7/28/2023)       Objective     /82 (BP Location: Left arm, Patient Position: Sitting, Cuff Size: Standard)   Pulse 85   Temp (!) 97.2 °F (36.2 °C) (Tympanic)   Ht 5' 7" (1.702 m)   Wt 86.4 kg (190 lb 6.4 oz)   LMP 10/30/2023 (Exact Date)   SpO2 100%   Breastfeeding No   BMI 29.82 kg/m²     Physical Exam  Vitals reviewed. Constitutional:       General: She is not in acute distress. Appearance: Normal appearance. She is not ill-appearing. HENT:      Head: Normocephalic and atraumatic. Right Ear: Tympanic membrane, ear canal and external ear normal.      Left Ear: Tympanic membrane, ear canal and external ear normal.      Nose: Congestion present. Right Turbinates: Enlarged. Left Turbinates: Not enlarged. Right Sinus: No maxillary sinus tenderness or frontal sinus tenderness. Left Sinus: No maxillary sinus tenderness or frontal sinus tenderness. Mouth/Throat:      Mouth: Mucous membranes are moist.      Pharynx: Oropharynx is clear. Eyes:      Conjunctiva/sclera: Conjunctivae normal.      Pupils: Pupils are equal, round, and reactive to light. Cardiovascular:      Rate and Rhythm: Normal rate and regular rhythm. Pulses: Normal pulses. Heart sounds: Normal heart sounds. Pulmonary:      Effort: Pulmonary effort is normal.      Breath sounds: Normal breath sounds. No wheezing. Abdominal:      General: Bowel sounds are normal.      Palpations: Abdomen is soft. Tenderness: There is no abdominal tenderness. Musculoskeletal:         General: Normal range of motion. Cervical back: Normal range of motion and neck supple. Skin:     General: Skin is warm and dry. Neurological:      General: No focal deficit present. Mental Status: She is alert and oriented to person, place, and time.    Psychiatric:         Mood and Affect: Mood normal.         Behavior: Behavior normal.       KINJAL Bazzi

## 2023-11-30 ENCOUNTER — APPOINTMENT (OUTPATIENT)
Dept: LAB | Facility: CLINIC | Age: 38
End: 2023-11-30
Payer: COMMERCIAL

## 2023-11-30 DIAGNOSIS — Z86.2 HISTORY OF ANEMIA: ICD-10-CM

## 2023-11-30 DIAGNOSIS — Z13.29 SCREENING FOR THYROID DISORDER: ICD-10-CM

## 2023-11-30 DIAGNOSIS — E78.2 MIXED HYPERLIPIDEMIA: ICD-10-CM

## 2023-11-30 DIAGNOSIS — E53.8 VITAMIN B 12 DEFICIENCY: ICD-10-CM

## 2023-11-30 DIAGNOSIS — Z86.32 HISTORY OF GESTATIONAL DIABETES: ICD-10-CM

## 2023-11-30 DIAGNOSIS — E55.9 VITAMIN D DEFICIENCY DISEASE: ICD-10-CM

## 2023-11-30 LAB
25(OH)D3 SERPL-MCNC: 18.4 NG/ML (ref 30–100)
ALBUMIN SERPL BCP-MCNC: 4 G/DL (ref 3.5–5)
ALP SERPL-CCNC: 47 U/L (ref 34–104)
ALT SERPL W P-5'-P-CCNC: 10 U/L (ref 7–52)
ANION GAP SERPL CALCULATED.3IONS-SCNC: 9 MMOL/L
AST SERPL W P-5'-P-CCNC: 13 U/L (ref 13–39)
BASOPHILS # BLD AUTO: 0.04 THOUSANDS/ÂΜL (ref 0–0.1)
BASOPHILS NFR BLD AUTO: 1 % (ref 0–1)
BILIRUB SERPL-MCNC: 1.39 MG/DL (ref 0.2–1)
BUN SERPL-MCNC: 12 MG/DL (ref 5–25)
CALCIUM SERPL-MCNC: 8.9 MG/DL (ref 8.4–10.2)
CHLORIDE SERPL-SCNC: 102 MMOL/L (ref 96–108)
CHOLEST SERPL-MCNC: 228 MG/DL
CO2 SERPL-SCNC: 26 MMOL/L (ref 21–32)
CREAT SERPL-MCNC: 0.74 MG/DL (ref 0.6–1.3)
EOSINOPHIL # BLD AUTO: 0.07 THOUSAND/ÂΜL (ref 0–0.61)
EOSINOPHIL NFR BLD AUTO: 1 % (ref 0–6)
ERYTHROCYTE [DISTWIDTH] IN BLOOD BY AUTOMATED COUNT: 15.9 % (ref 11.6–15.1)
EST. AVERAGE GLUCOSE BLD GHB EST-MCNC: 114 MG/DL
FERRITIN SERPL-MCNC: 3 NG/ML (ref 11–307)
FOLATE SERPL-MCNC: 10.9 NG/ML
GFR SERPL CREATININE-BSD FRML MDRD: 104 ML/MIN/1.73SQ M
GLUCOSE P FAST SERPL-MCNC: 87 MG/DL (ref 65–99)
HBA1C MFR BLD: 5.6 %
HCT VFR BLD AUTO: 36.2 % (ref 34.8–46.1)
HDLC SERPL-MCNC: 52 MG/DL
HGB BLD-MCNC: 11.1 G/DL (ref 11.5–15.4)
IMM GRANULOCYTES # BLD AUTO: 0.01 THOUSAND/UL (ref 0–0.2)
IMM GRANULOCYTES NFR BLD AUTO: 0 % (ref 0–2)
IRON SATN MFR SERPL: 8 % (ref 15–50)
IRON SERPL-MCNC: 59 UG/DL (ref 50–212)
LDLC SERPL CALC-MCNC: 126 MG/DL (ref 0–100)
LYMPHOCYTES # BLD AUTO: 1.99 THOUSANDS/ÂΜL (ref 0.6–4.47)
LYMPHOCYTES NFR BLD AUTO: 32 % (ref 14–44)
MCH RBC QN AUTO: 26.1 PG (ref 26.8–34.3)
MCHC RBC AUTO-ENTMCNC: 30.7 G/DL (ref 31.4–37.4)
MCV RBC AUTO: 85 FL (ref 82–98)
MONOCYTES # BLD AUTO: 0.47 THOUSAND/ÂΜL (ref 0.17–1.22)
MONOCYTES NFR BLD AUTO: 8 % (ref 4–12)
NEUTROPHILS # BLD AUTO: 3.61 THOUSANDS/ÂΜL (ref 1.85–7.62)
NEUTS SEG NFR BLD AUTO: 58 % (ref 43–75)
NONHDLC SERPL-MCNC: 176 MG/DL
NRBC BLD AUTO-RTO: 0 /100 WBCS
PLATELET # BLD AUTO: 540 THOUSANDS/UL (ref 149–390)
PMV BLD AUTO: 10.4 FL (ref 8.9–12.7)
POTASSIUM SERPL-SCNC: 4 MMOL/L (ref 3.5–5.3)
PROT SERPL-MCNC: 7 G/DL (ref 6.4–8.4)
RBC # BLD AUTO: 4.26 MILLION/UL (ref 3.81–5.12)
SODIUM SERPL-SCNC: 137 MMOL/L (ref 135–147)
TIBC SERPL-MCNC: 751 UG/DL (ref 250–450)
TRIGL SERPL-MCNC: 248 MG/DL
TSH SERPL DL<=0.05 MIU/L-ACNC: 0.98 UIU/ML (ref 0.45–4.5)
UIBC SERPL-MCNC: 692 UG/DL (ref 155–355)
VIT B12 SERPL-MCNC: 165 PG/ML (ref 180–914)
WBC # BLD AUTO: 6.19 THOUSAND/UL (ref 4.31–10.16)

## 2023-11-30 PROCEDURE — 85025 COMPLETE CBC W/AUTO DIFF WBC: CPT

## 2023-11-30 PROCEDURE — 83550 IRON BINDING TEST: CPT

## 2023-11-30 PROCEDURE — 83540 ASSAY OF IRON: CPT

## 2023-11-30 PROCEDURE — 82607 VITAMIN B-12: CPT

## 2023-11-30 PROCEDURE — 83036 HEMOGLOBIN GLYCOSYLATED A1C: CPT

## 2023-11-30 PROCEDURE — 80053 COMPREHEN METABOLIC PANEL: CPT

## 2023-11-30 PROCEDURE — 84443 ASSAY THYROID STIM HORMONE: CPT

## 2023-11-30 PROCEDURE — 36415 COLL VENOUS BLD VENIPUNCTURE: CPT

## 2023-11-30 PROCEDURE — 82746 ASSAY OF FOLIC ACID SERUM: CPT

## 2023-11-30 PROCEDURE — 80061 LIPID PANEL: CPT

## 2023-11-30 PROCEDURE — 82728 ASSAY OF FERRITIN: CPT

## 2023-11-30 PROCEDURE — 82306 VITAMIN D 25 HYDROXY: CPT

## 2023-12-06 DIAGNOSIS — R09.81 NASAL CONGESTION: ICD-10-CM

## 2023-12-06 RX ORDER — FLUTICASONE PROPIONATE 50 MCG
SPRAY, SUSPENSION (ML) NASAL
Qty: 48 ML | Refills: 1 | Status: SHIPPED | OUTPATIENT
Start: 2023-12-06

## 2024-01-09 DIAGNOSIS — Z30.41 SURVEILLANCE FOR BIRTH CONTROL, ORAL CONTRACEPTIVES: ICD-10-CM

## 2024-01-09 RX ORDER — NORETHINDRONE ACETATE AND ETHINYL ESTRADIOL, ETHINYL ESTRADIOL AND FERROUS FUMARATE 1MG-10(24)
1 KIT ORAL DAILY
Qty: 28 TABLET | Refills: 0 | Status: SHIPPED | OUTPATIENT
Start: 2024-01-09

## 2024-02-06 ENCOUNTER — OFFICE VISIT (OUTPATIENT)
Dept: FAMILY MEDICINE CLINIC | Facility: CLINIC | Age: 39
End: 2024-02-06
Payer: COMMERCIAL

## 2024-02-06 VITALS
BODY MASS INDEX: 30.1 KG/M2 | DIASTOLIC BLOOD PRESSURE: 76 MMHG | OXYGEN SATURATION: 99 % | SYSTOLIC BLOOD PRESSURE: 120 MMHG | HEIGHT: 67 IN | WEIGHT: 191.8 LBS | HEART RATE: 77 BPM | TEMPERATURE: 96.6 F

## 2024-02-06 DIAGNOSIS — E78.2 MODERATE MIXED HYPERLIPIDEMIA NOT REQUIRING STATIN THERAPY: ICD-10-CM

## 2024-02-06 DIAGNOSIS — E53.8 VITAMIN B 12 DEFICIENCY: ICD-10-CM

## 2024-02-06 DIAGNOSIS — D50.9 IRON DEFICIENCY ANEMIA, UNSPECIFIED IRON DEFICIENCY ANEMIA TYPE: ICD-10-CM

## 2024-02-06 DIAGNOSIS — Z00.00 ANNUAL PHYSICAL EXAM: Primary | ICD-10-CM

## 2024-02-06 DIAGNOSIS — E55.9 VITAMIN D DEFICIENCY DISEASE: ICD-10-CM

## 2024-02-06 PROCEDURE — 96372 THER/PROPH/DIAG INJ SC/IM: CPT

## 2024-02-06 PROCEDURE — 99395 PREV VISIT EST AGE 18-39: CPT | Performed by: NURSE PRACTITIONER

## 2024-02-06 RX ORDER — ERGOCALCIFEROL 1.25 MG/1
50000 CAPSULE ORAL WEEKLY
Qty: 12 CAPSULE | Refills: 0 | Status: SHIPPED | OUTPATIENT
Start: 2024-02-06 | End: 2024-04-24

## 2024-02-06 RX ORDER — CYANOCOBALAMIN 1000 UG/ML
1000 INJECTION, SOLUTION INTRAMUSCULAR; SUBCUTANEOUS WEEKLY
Status: SHIPPED | OUTPATIENT
Start: 2024-02-06 | End: 2024-03-05

## 2024-02-06 RX ADMIN — CYANOCOBALAMIN 1000 MCG: 1000 INJECTION, SOLUTION INTRAMUSCULAR; SUBCUTANEOUS at 13:39

## 2024-02-06 NOTE — ASSESSMENT & PLAN NOTE
Recent blood work reviewed with patient.  This time, will initiate Vitron-C.  Educated patient on how to take medication and possible side effects.  Will repeat blood work in 6 months.  To obtain FIT test as ordered.

## 2024-02-06 NOTE — ASSESSMENT & PLAN NOTE
Recent blood work reviewed with patient.  Discussed supplementation including oral supplements and injections.  Patient agreeable to vitamin B12 injections.  Discussed with patient receiving 1 injection once weekly for 4 weeks, then 1 injection once monthly for 3 months, then will transition to oral supplements.  Patient verbalized understanding of plan.

## 2024-02-06 NOTE — PROGRESS NOTES
ADULT ANNUAL PHYSICAL  [facility] [address] [facility]    NAME: Alexandra Espinosa  AGE: 38 y.o. SEX: female  : 1985     DATE: 2024     Assessment and Plan:     Problem List Items Addressed This Visit          Other    Vitamin D deficiency disease    Relevant Medications    ergocalciferol (VITAMIN D2) 50,000 units    Other Relevant Orders    Vitamin D 25 hydroxy    Vitamin B 12 deficiency     Recent blood work reviewed with patient.  Discussed supplementation including oral supplements and injections.  Patient agreeable to vitamin B12 injections.  Discussed with patient receiving 1 injection once weekly for 4 weeks, then 1 injection once monthly for 3 months, then will transition to oral supplements.  Patient verbalized understanding of plan.          Relevant Medications    cyanocobalamin injection 1,000 mcg    Other Relevant Orders    Vitamin B12    Moderate mixed hyperlipidemia not requiring statin therapy     Recent blood work reviewed with patient.    Encouraged diet modification plus management of weight.  Discussed a dietary reduction in total cholesterol and saturated fats.  The addition to diet of plant stanols/sterols, as well as increased consumption of dietary fiber, complex carbohydrates, and unsaturated fats.  Aerobic exercise is also recommended to help reduce LDL.  Diets like a Mediterranean diet can improve lipid profile.  To repeat blood work in 6 months.  Discussed use of omega-3/fish oil or red yeast rice and fiber supplements         Relevant Orders    Comprehensive metabolic panel    Lipid Panel with Direct LDL reflex    Iron deficiency anemia     Recent blood work reviewed with patient.  This time, will initiate Vitron-C.  Educated patient on how to take medication and possible side effects.  Will repeat blood work in 6 months.  To obtain FIT test as ordered.         Relevant Medications    Iron-Vitamin C  MG  TABS    cyanocobalamin injection 1,000 mcg    Other Relevant Orders    Occult Blood, Fecal Immunochemical    CBC and differential    Iron Panel (Includes Ferritin, Iron Sat%, Iron, and TIBC)     Other Visit Diagnoses       Annual physical exam    -  Primary              Immunizations and preventive care screenings were discussed with patient today. Appropriate education was printed on patient's after visit summary.    Counseling:  Alcohol/drug use: discussed moderation in alcohol intake, the recommendations for healthy alcohol use, and avoidance of illicit drug use.  Dental Health: discussed importance of regular tooth brushing, flossing, and dental visits.  Injury prevention: discussed safety/seat belts, safety helmets, smoke detectors, carbon dioxide detectors, and smoking near bedding or upholstery.  Sexual health: discussed sexually transmitted diseases, partner selection, use of condoms, avoidance of unintended pregnancy, and contraceptive alternatives.  Exercise: the importance of regular exercise/physical activity was discussed. Recommend exercise 3-5 times per week for at least 30 minutes.          Return in about 6 months (around 2024) for Recheck.     Chief Complaint:     Chief Complaint   Patient presents with    Physical Exam      History of Present Illness:     Adult Annual Physical   Patient here for a comprehensive physical exam. The patient reports no problems.    Diet and Physical Activity  Diet/Nutrition: limited fruits/vegetables.   Exercise: no formal exercise.      Depression Screening  PHQ-2/9 Depression Screening    Little interest or pleasure in doing things: 0 - not at all  Feeling down, depressed, or hopeless: 1 - several days  Trouble falling or staying asleep, or sleeping too much: 1 - several days  Feeling tired or having little energy: 1 - several days  Poor appetite or overeatin - not at all  Feeling bad about yourself - or that you are a failure or have let yourself or your  family down: 0 - not at all  Trouble concentrating on things, such as reading the newspaper or watching television: 1 - several days  Moving or speaking so slowly that other people could have noticed. Or the opposite - being so fidgety or restless that you have been moving around a lot more than usual: 0 - not at all  Thoughts that you would be better off dead, or of hurting yourself in some way: 0 - not at all       General Health  Sleep: sleeps poorly, gets 4-6 hours of sleep on average, and gets 7-8 hours of sleep on average.   Hearing: normal - bilateral.  Vision:  wears glasses for distance/driving; astigmatism .   Dental: no dental visits for >1 year and brushes teeth twice daily.       /GYN Health  Follows with gynecology? yes   Last menstrual period: 1/24/2024  Contraceptive method: oral contraceptives.  History of STDs?: no.     Advanced Care Planning  Do you have an advanced directive? no  Do you have a durable medical power of ? no     Review of Systems:     Review of Systems   Constitutional:  Negative for activity change, appetite change, fatigue and unexpected weight change.   HENT:  Negative for congestion, ear pain and sore throat.    Eyes:  Negative for photophobia and visual disturbance.   Respiratory:  Negative for cough, chest tightness and shortness of breath.    Cardiovascular:  Negative for chest pain and palpitations.   Gastrointestinal:  Negative for abdominal pain, blood in stool, constipation, diarrhea, nausea and vomiting.   Endocrine: Negative for polydipsia, polyphagia and polyuria.   Genitourinary:  Negative for decreased urine volume, dysuria, flank pain, frequency, hematuria, urgency and vaginal bleeding.   Musculoskeletal:  Negative for arthralgias, back pain and myalgias.   Skin:  Negative for color change and rash.   Neurological:  Negative for dizziness, syncope, weakness, light-headedness and headaches.   Hematological:  Negative for adenopathy. Bruises/bleeds easily.    Psychiatric/Behavioral:  Negative for agitation, confusion and dysphoric mood. The patient is not nervous/anxious.       Past Medical History:     Past Medical History:   Diagnosis Date    Anemia     Anxiety     Gestational diabetes 2/27/2023      Past Surgical History:     Past Surgical History:   Procedure Laterality Date    WISDOM TOOTH EXTRACTION        Social History:     Social History     Socioeconomic History    Marital status: /Civil Union     Spouse name: None    Number of children: None    Years of education: None    Highest education level: None   Occupational History    Occupation:    Tobacco Use    Smoking status: Never     Passive exposure: Never    Smokeless tobacco: Never   Vaping Use    Vaping status: Never Used   Substance and Sexual Activity    Alcohol use: Not Currently    Drug use: Never    Sexual activity: Yes     Partners: Male     Birth control/protection: Other, None     Comment: the pill lo estrine   Other Topics Concern    None   Social History Narrative    None     Social Determinants of Health     Financial Resource Strain: Not on file   Food Insecurity: Not on file   Transportation Needs: Not on file   Physical Activity: Not on file   Stress: Not on file   Social Connections: Not on file   Intimate Partner Violence: Not on file   Housing Stability: Not on file      Family History:     Family History   Problem Relation Age of Onset    Hypertension Mother     Stroke Maternal Grandfather       Current Medications:     Current Outpatient Medications   Medication Sig Dispense Refill    ergocalciferol (VITAMIN D2) 50,000 units Take 1 capsule (50,000 Units total) by mouth once a week for 12 doses 12 capsule 0    fluticasone (FLONASE) 50 mcg/act nasal spray SPRAY 1 SPRAY INTO EACH NOSTRIL EVERY DAY 48 mL 1    Iron-Vitamin C  MG TABS Take 1 tablet by mouth in the morning 90 tablet 1    Lo Loestrin Fe 1 MG-10 MCG / 10 MCG TABS Take 1 tablet by mouth daily 28  "tablet 0    sertraline (ZOLOFT) 100 mg tablet Take 50 mg by mouth daily       Current Facility-Administered Medications   Medication Dose Route Frequency Provider Last Rate Last Admin    cyanocobalamin injection 1,000 mcg  1,000 mcg Intramuscular Weekly KINJAL Hinojosa   1,000 mcg at 02/06/24 1339      Allergies:     No Known Allergies   Physical Exam:     /76 (BP Location: Left arm, Patient Position: Sitting, Cuff Size: Standard)   Pulse 77   Temp (!) 96.6 °F (35.9 °C) (Tympanic)   Ht 5' 7\" (1.702 m)   Wt 87 kg (191 lb 12.8 oz)   LMP 01/24/2024 (Approximate)   SpO2 99%   BMI 30.04 kg/m²     Physical Exam  Vitals reviewed.   Constitutional:       General: She is not in acute distress.     Appearance: Normal appearance. She is well-developed. She is not ill-appearing.   HENT:      Head: Normocephalic and atraumatic.      Right Ear: Tympanic membrane, ear canal and external ear normal.      Left Ear: Tympanic membrane, ear canal and external ear normal.      Nose: Nose normal.      Mouth/Throat:      Mouth: Mucous membranes are moist.      Pharynx: Oropharynx is clear.   Eyes:      Extraocular Movements: Extraocular movements intact.      Conjunctiva/sclera: Conjunctivae normal.      Pupils: Pupils are equal, round, and reactive to light.   Cardiovascular:      Rate and Rhythm: Normal rate and regular rhythm.      Pulses: Normal pulses.      Heart sounds: Normal heart sounds. No murmur heard.  Pulmonary:      Effort: Pulmonary effort is normal. No respiratory distress.      Breath sounds: Normal breath sounds.   Abdominal:      General: Bowel sounds are normal.      Palpations: Abdomen is soft.      Tenderness: There is no abdominal tenderness.   Musculoskeletal:         General: No swelling. Normal range of motion.      Cervical back: Normal range of motion and neck supple.      Right lower leg: No edema.      Left lower leg: No edema.   Lymphadenopathy:      Cervical: No cervical adenopathy. "   Skin:     General: Skin is warm and dry.      Capillary Refill: Capillary refill takes less than 2 seconds.   Neurological:      General: No focal deficit present.      Mental Status: She is alert and oriented to person, place, and time.   Psychiatric:         Mood and Affect: Mood normal.         Behavior: Behavior normal.          KINJAL Hinojosa   [facility] [address] [facility]

## 2024-02-06 NOTE — ASSESSMENT & PLAN NOTE
Recent blood work reviewed with patient.    Encouraged diet modification plus management of weight.  Discussed a dietary reduction in total cholesterol and saturated fats.  The addition to diet of plant stanols/sterols, as well as increased consumption of dietary fiber, complex carbohydrates, and unsaturated fats.  Aerobic exercise is also recommended to help reduce LDL.  Diets like a Mediterranean diet can improve lipid profile.  To repeat blood work in 6 months.  Discussed use of omega-3/fish oil or red yeast rice and fiber supplements

## 2024-02-14 DIAGNOSIS — Z30.41 SURVEILLANCE FOR BIRTH CONTROL, ORAL CONTRACEPTIVES: ICD-10-CM

## 2024-02-14 RX ORDER — NORETHINDRONE ACETATE AND ETHINYL ESTRADIOL, ETHINYL ESTRADIOL AND FERROUS FUMARATE 1MG-10(24)
1 KIT ORAL DAILY
Qty: 28 TABLET | Refills: 0 | Status: SHIPPED | OUTPATIENT
Start: 2024-02-14

## 2024-02-20 ENCOUNTER — CLINICAL SUPPORT (OUTPATIENT)
Dept: FAMILY MEDICINE CLINIC | Facility: CLINIC | Age: 39
End: 2024-02-20
Payer: COMMERCIAL

## 2024-02-20 DIAGNOSIS — E53.8 VITAMIN B 12 DEFICIENCY: Primary | ICD-10-CM

## 2024-02-20 PROCEDURE — 96372 THER/PROPH/DIAG INJ SC/IM: CPT

## 2024-02-20 RX ADMIN — CYANOCOBALAMIN 1000 MCG: 1000 INJECTION, SOLUTION INTRAMUSCULAR; SUBCUTANEOUS at 09:05

## 2024-02-29 ENCOUNTER — CLINICAL SUPPORT (OUTPATIENT)
Dept: FAMILY MEDICINE CLINIC | Facility: CLINIC | Age: 39
End: 2024-02-29
Payer: COMMERCIAL

## 2024-02-29 DIAGNOSIS — E53.8 VITAMIN B 12 DEFICIENCY: Primary | ICD-10-CM

## 2024-02-29 PROCEDURE — 96372 THER/PROPH/DIAG INJ SC/IM: CPT

## 2024-02-29 RX ADMIN — CYANOCOBALAMIN 1000 MCG: 1000 INJECTION, SOLUTION INTRAMUSCULAR; SUBCUTANEOUS at 09:20

## 2024-03-07 ENCOUNTER — CLINICAL SUPPORT (OUTPATIENT)
Dept: FAMILY MEDICINE CLINIC | Facility: CLINIC | Age: 39
End: 2024-03-07
Payer: COMMERCIAL

## 2024-03-07 DIAGNOSIS — E53.8 VITAMIN B 12 DEFICIENCY: Primary | ICD-10-CM

## 2024-03-07 PROCEDURE — 96372 THER/PROPH/DIAG INJ SC/IM: CPT

## 2024-03-07 RX ORDER — CYANOCOBALAMIN 1000 UG/ML
1000 INJECTION, SOLUTION INTRAMUSCULAR; SUBCUTANEOUS ONCE
Status: COMPLETED | OUTPATIENT
Start: 2024-03-07 | End: 2024-03-07

## 2024-03-07 RX ADMIN — CYANOCOBALAMIN 1000 MCG: 1000 INJECTION, SOLUTION INTRAMUSCULAR; SUBCUTANEOUS at 09:33

## 2024-03-13 DIAGNOSIS — Z30.41 SURVEILLANCE FOR BIRTH CONTROL, ORAL CONTRACEPTIVES: ICD-10-CM

## 2024-03-13 RX ORDER — NORETHINDRONE ACETATE AND ETHINYL ESTRADIOL, ETHINYL ESTRADIOL AND FERROUS FUMARATE 1MG-10(24)
1 KIT ORAL DAILY
Qty: 28 TABLET | Refills: 0 | Status: SHIPPED | OUTPATIENT
Start: 2024-03-13

## 2024-04-11 DIAGNOSIS — Z30.41 SURVEILLANCE FOR BIRTH CONTROL, ORAL CONTRACEPTIVES: ICD-10-CM

## 2024-04-11 DIAGNOSIS — D50.9 IRON DEFICIENCY ANEMIA, UNSPECIFIED IRON DEFICIENCY ANEMIA TYPE: ICD-10-CM

## 2024-04-12 RX ORDER — NORETHINDRONE ACETATE AND ETHINYL ESTRADIOL, ETHINYL ESTRADIOL AND FERROUS FUMARATE 1MG-10(24)
1 KIT ORAL DAILY
Qty: 28 TABLET | Refills: 0 | Status: SHIPPED | OUTPATIENT
Start: 2024-04-12

## 2024-04-30 ENCOUNTER — TELEPHONE (OUTPATIENT)
Dept: FAMILY MEDICINE CLINIC | Facility: CLINIC | Age: 39
End: 2024-04-30

## 2024-04-30 ENCOUNTER — APPOINTMENT (OUTPATIENT)
Dept: LAB | Facility: CLINIC | Age: 39
End: 2024-04-30
Payer: COMMERCIAL

## 2024-04-30 DIAGNOSIS — D50.9 IRON DEFICIENCY ANEMIA, UNSPECIFIED IRON DEFICIENCY ANEMIA TYPE: ICD-10-CM

## 2024-04-30 PROCEDURE — G0328 FECAL BLOOD SCRN IMMUNOASSAY: HCPCS

## 2024-05-01 LAB — HEMOCCULT STL QL IA: NEGATIVE

## 2024-05-10 DIAGNOSIS — Z30.41 SURVEILLANCE FOR BIRTH CONTROL, ORAL CONTRACEPTIVES: ICD-10-CM

## 2024-05-13 RX ORDER — NORETHINDRONE ACETATE AND ETHINYL ESTRADIOL, ETHINYL ESTRADIOL AND FERROUS FUMARATE 1MG-10(24)
1 KIT ORAL DAILY
Qty: 84 TABLET | Refills: 1 | Status: SHIPPED | OUTPATIENT
Start: 2024-05-13

## 2024-05-15 DIAGNOSIS — N92.6 MISSED PERIOD: Primary | ICD-10-CM

## 2024-05-16 ENCOUNTER — APPOINTMENT (OUTPATIENT)
Dept: LAB | Facility: CLINIC | Age: 39
End: 2024-05-16
Payer: COMMERCIAL

## 2024-05-16 DIAGNOSIS — D50.9 IRON DEFICIENCY ANEMIA, UNSPECIFIED IRON DEFICIENCY ANEMIA TYPE: ICD-10-CM

## 2024-05-16 DIAGNOSIS — E55.9 VITAMIN D DEFICIENCY DISEASE: ICD-10-CM

## 2024-05-16 DIAGNOSIS — E53.8 VITAMIN B 12 DEFICIENCY: ICD-10-CM

## 2024-05-16 DIAGNOSIS — N92.6 MISSED PERIOD: ICD-10-CM

## 2024-05-16 DIAGNOSIS — E78.2 MODERATE MIXED HYPERLIPIDEMIA NOT REQUIRING STATIN THERAPY: ICD-10-CM

## 2024-05-16 LAB
25(OH)D3 SERPL-MCNC: 24.1 NG/ML (ref 30–100)
ALBUMIN SERPL BCP-MCNC: 4.1 G/DL (ref 3.5–5)
ALP SERPL-CCNC: 46 U/L (ref 34–104)
ALT SERPL W P-5'-P-CCNC: 14 U/L (ref 7–52)
ANION GAP SERPL CALCULATED.3IONS-SCNC: 9 MMOL/L (ref 4–13)
AST SERPL W P-5'-P-CCNC: 16 U/L (ref 13–39)
B-HCG SERPL-ACNC: <0.6 MIU/ML (ref 0–5)
BASOPHILS # BLD AUTO: 0.03 THOUSANDS/ÂΜL (ref 0–0.1)
BASOPHILS NFR BLD AUTO: 1 % (ref 0–1)
BILIRUB SERPL-MCNC: 1.53 MG/DL (ref 0.2–1)
BUN SERPL-MCNC: 13 MG/DL (ref 5–25)
CALCIUM SERPL-MCNC: 9 MG/DL (ref 8.4–10.2)
CHLORIDE SERPL-SCNC: 103 MMOL/L (ref 96–108)
CHOLEST SERPL-MCNC: 213 MG/DL
CO2 SERPL-SCNC: 26 MMOL/L (ref 21–32)
CREAT SERPL-MCNC: 0.65 MG/DL (ref 0.6–1.3)
EOSINOPHIL # BLD AUTO: 0.07 THOUSAND/ÂΜL (ref 0–0.61)
EOSINOPHIL NFR BLD AUTO: 1 % (ref 0–6)
ERYTHROCYTE [DISTWIDTH] IN BLOOD BY AUTOMATED COUNT: 13.6 % (ref 11.6–15.1)
FERRITIN SERPL-MCNC: 7 NG/ML (ref 11–307)
GFR SERPL CREATININE-BSD FRML MDRD: 113 ML/MIN/1.73SQ M
GLUCOSE P FAST SERPL-MCNC: 86 MG/DL (ref 65–99)
HCG SERPL QL: NEGATIVE
HCT VFR BLD AUTO: 37.4 % (ref 34.8–46.1)
HDLC SERPL-MCNC: 50 MG/DL
HGB BLD-MCNC: 11.9 G/DL (ref 11.5–15.4)
IMM GRANULOCYTES # BLD AUTO: 0.01 THOUSAND/UL (ref 0–0.2)
IMM GRANULOCYTES NFR BLD AUTO: 0 % (ref 0–2)
IRON SATN MFR SERPL: 20 % (ref 15–50)
IRON SERPL-MCNC: 100 UG/DL (ref 50–212)
LDLC SERPL CALC-MCNC: 131 MG/DL (ref 0–100)
LYMPHOCYTES # BLD AUTO: 1.59 THOUSANDS/ÂΜL (ref 0.6–4.47)
LYMPHOCYTES NFR BLD AUTO: 29 % (ref 14–44)
MCH RBC QN AUTO: 28.4 PG (ref 26.8–34.3)
MCHC RBC AUTO-ENTMCNC: 31.8 G/DL (ref 31.4–37.4)
MCV RBC AUTO: 89 FL (ref 82–98)
MONOCYTES # BLD AUTO: 0.56 THOUSAND/ÂΜL (ref 0.17–1.22)
MONOCYTES NFR BLD AUTO: 10 % (ref 4–12)
NEUTROPHILS # BLD AUTO: 3.32 THOUSANDS/ÂΜL (ref 1.85–7.62)
NEUTS SEG NFR BLD AUTO: 59 % (ref 43–75)
NRBC BLD AUTO-RTO: 0 /100 WBCS
PLATELET # BLD AUTO: 408 THOUSANDS/UL (ref 149–390)
PMV BLD AUTO: 10.4 FL (ref 8.9–12.7)
POTASSIUM SERPL-SCNC: 4 MMOL/L (ref 3.5–5.3)
PROT SERPL-MCNC: 7.2 G/DL (ref 6.4–8.4)
RBC # BLD AUTO: 4.19 MILLION/UL (ref 3.81–5.12)
SODIUM SERPL-SCNC: 138 MMOL/L (ref 135–147)
TIBC SERPL-MCNC: 500 UG/DL (ref 250–450)
TRIGL SERPL-MCNC: 159 MG/DL
UIBC SERPL-MCNC: 400 UG/DL (ref 155–355)
VIT B12 SERPL-MCNC: 185 PG/ML (ref 180–914)
WBC # BLD AUTO: 5.58 THOUSAND/UL (ref 4.31–10.16)

## 2024-05-16 PROCEDURE — 82728 ASSAY OF FERRITIN: CPT

## 2024-05-16 PROCEDURE — 80061 LIPID PANEL: CPT

## 2024-05-16 PROCEDURE — 83540 ASSAY OF IRON: CPT

## 2024-05-16 PROCEDURE — 84702 CHORIONIC GONADOTROPIN TEST: CPT

## 2024-05-16 PROCEDURE — 80053 COMPREHEN METABOLIC PANEL: CPT

## 2024-05-16 PROCEDURE — 36415 COLL VENOUS BLD VENIPUNCTURE: CPT

## 2024-05-16 PROCEDURE — 83550 IRON BINDING TEST: CPT

## 2024-05-16 PROCEDURE — 82607 VITAMIN B-12: CPT

## 2024-05-16 PROCEDURE — 85025 COMPLETE CBC W/AUTO DIFF WBC: CPT

## 2024-05-16 PROCEDURE — 84703 CHORIONIC GONADOTROPIN ASSAY: CPT

## 2024-05-16 PROCEDURE — 82306 VITAMIN D 25 HYDROXY: CPT

## 2024-05-17 DIAGNOSIS — D50.9 IRON DEFICIENCY ANEMIA, UNSPECIFIED IRON DEFICIENCY ANEMIA TYPE: ICD-10-CM

## 2024-05-17 DIAGNOSIS — E53.8 VITAMIN B 12 DEFICIENCY: Primary | ICD-10-CM

## 2024-05-17 DIAGNOSIS — E55.9 VITAMIN D DEFICIENCY DISEASE: ICD-10-CM

## 2024-06-10 DIAGNOSIS — F41.9 ANXIETY AND DEPRESSION: Primary | ICD-10-CM

## 2024-06-10 DIAGNOSIS — F32.A ANXIETY AND DEPRESSION: Primary | ICD-10-CM

## 2024-06-10 RX ORDER — SERTRALINE HYDROCHLORIDE 25 MG/1
25 TABLET, FILM COATED ORAL DAILY
Qty: 30 TABLET | Refills: 0 | Status: SHIPPED | OUTPATIENT
Start: 2024-06-10 | End: 2024-06-17 | Stop reason: SDUPTHER

## 2024-06-17 DIAGNOSIS — F41.9 ANXIETY AND DEPRESSION: ICD-10-CM

## 2024-06-17 DIAGNOSIS — F32.A ANXIETY AND DEPRESSION: ICD-10-CM

## 2024-06-17 RX ORDER — SERTRALINE HYDROCHLORIDE 25 MG/1
25 TABLET, FILM COATED ORAL DAILY
Qty: 30 TABLET | Refills: 0 | Status: SHIPPED | OUTPATIENT
Start: 2024-06-17 | End: 2024-06-26 | Stop reason: SDUPTHER

## 2024-06-26 ENCOUNTER — OFFICE VISIT (OUTPATIENT)
Dept: FAMILY MEDICINE CLINIC | Facility: CLINIC | Age: 39
End: 2024-06-26
Payer: COMMERCIAL

## 2024-06-26 VITALS
WEIGHT: 183.4 LBS | RESPIRATION RATE: 18 BRPM | OXYGEN SATURATION: 98 % | DIASTOLIC BLOOD PRESSURE: 80 MMHG | HEART RATE: 77 BPM | SYSTOLIC BLOOD PRESSURE: 118 MMHG | HEIGHT: 67 IN | BODY MASS INDEX: 28.79 KG/M2

## 2024-06-26 DIAGNOSIS — F41.9 ANXIETY AND DEPRESSION: Primary | ICD-10-CM

## 2024-06-26 DIAGNOSIS — D50.8 OTHER IRON DEFICIENCY ANEMIA: ICD-10-CM

## 2024-06-26 DIAGNOSIS — E53.8 VITAMIN B 12 DEFICIENCY: ICD-10-CM

## 2024-06-26 DIAGNOSIS — F32.A ANXIETY AND DEPRESSION: Primary | ICD-10-CM

## 2024-06-26 DIAGNOSIS — E55.9 VITAMIN D DEFICIENCY DISEASE: ICD-10-CM

## 2024-06-26 PROCEDURE — 99214 OFFICE O/P EST MOD 30 MIN: CPT | Performed by: NURSE PRACTITIONER

## 2024-06-26 PROCEDURE — 96372 THER/PROPH/DIAG INJ SC/IM: CPT

## 2024-06-26 RX ORDER — CHOLECALCIFEROL (VITAMIN D3) 50 MCG
2000 TABLET ORAL DAILY
Qty: 90 TABLET | Refills: 1 | Status: SHIPPED | OUTPATIENT
Start: 2024-06-26

## 2024-06-26 RX ORDER — SERTRALINE HYDROCHLORIDE 25 MG/1
25 TABLET, FILM COATED ORAL DAILY
Qty: 30 TABLET | Refills: 0 | Status: SHIPPED | OUTPATIENT
Start: 2024-06-26

## 2024-06-26 RX ORDER — BUSPIRONE HYDROCHLORIDE 7.5 MG/1
7.5 TABLET ORAL 2 TIMES DAILY
Qty: 180 TABLET | Refills: 0 | Status: SHIPPED | OUTPATIENT
Start: 2024-06-26

## 2024-06-26 RX ORDER — CYANOCOBALAMIN 1000 UG/ML
1000 INJECTION, SOLUTION INTRAMUSCULAR; SUBCUTANEOUS
Status: SHIPPED | OUTPATIENT
Start: 2024-06-26 | End: 2024-09-24

## 2024-06-26 RX ADMIN — CYANOCOBALAMIN 1000 MCG: 1000 INJECTION, SOLUTION INTRAMUSCULAR; SUBCUTANEOUS at 17:15

## 2024-06-26 NOTE — ASSESSMENT & PLAN NOTE
Completed initial 4-week course, but never started monthly injections.  Will initiate first monthly injection today and advised will have monthly injections for 2 more doses.

## 2024-06-26 NOTE — ASSESSMENT & PLAN NOTE
Patient was never able to obtain Vitron-C that was prescribed to her 4 months ago.  Has not had any form of iron supplementation.  Patient states she will order Vitron-C through [facility].  Advised patient when she starts medication, to obtain blood work in 4 months.

## 2024-06-26 NOTE — PROGRESS NOTES
Ambulatory Visit  Name: Alexandra Espinosa      : 1985      MRN: [id number]  Encounter Provider: KINJAL Hinojosa  Encounter Date: 2024   Encounter department: [facility] [address]    Assessment & Plan   1. Anxiety and depression  Assessment & Plan:  Denies SI/HI.  Patient recently restarted on sertraline week ago.  Has had effectiveness in the past, but feels at this time, anxiety has been prevalent, mainly in the morning.  Discussed with patient medication may take up to 12 weeks to be effective.  Will initiate buspirone to be taken once to twice daily if needed.  Advised to return to office in 4 weeks to reevaluate effectiveness of sertraline.  Orders:  -     busPIRone (BUSPAR) 7.5 mg tablet; Take 1 tablet (7.5 mg total) by mouth 2 (two) times a day  -     sertraline (ZOLOFT) 25 mg tablet; Take 1 tablet (25 mg total) by mouth daily  2. Vitamin B 12 deficiency  Assessment & Plan:  Completed initial 4-week course, but never started monthly injections.  Will initiate first monthly injection today and advised will have monthly injections for 2 more doses.  Orders:  -     cyanocobalamin injection 1,000 mcg  3. Vitamin D deficiency disease  Assessment & Plan:  Patient completed loading dose of vitamin D, but never started daily supplementation.  Prescription for daily vitamin D given to patient.  Advised if it is not covered, can get over-the-counter.  Advised to take medication with food for better absorption  Orders:  -     Cholecalciferol (Vitamin D) 50 MCG (2000 UT) tablet; Take 1 tablet (2,000 Units total) by mouth daily  4. Other iron deficiency anemia  Assessment & Plan:  Patient was never able to obtain Vitron-C that was prescribed to her 4 months ago.  Has not had any form of iron supplementation.  Patient states she will order Vitron-C through [employer].  Advised patient when she starts medication, to obtain blood work in 4 months.       History of  Present Illness     Presents office for medication management.  Patient with previous diagnosis of anxiety depression.  Was started on medication postpartum after delivering her son a year and a half ago.  Patient states she was sertraline 100 mg at her highest.  Began feeling well, and stopped medication.  Was symptom-free for several months, then symptoms began to appear in the last 1 to 2 months.  Notes increased anxiety with panic attacks.  Experiences hyperventilation and chest tightness.  Just recently restarted sertraline at 25 mg last week.  Is still experiencing heightened anxiety in the morning.  Patient denies SI/HI, auditory/visual hallucinations.  Denies alcohol or drug use.        Review of Systems   Constitutional:  Negative for activity change, appetite change and fatigue.   HENT:  Negative for sore throat and trouble swallowing.    Eyes:  Negative for photophobia and visual disturbance.   Respiratory:  Negative for cough and shortness of breath.    Cardiovascular:  Negative for chest pain and palpitations.   Gastrointestinal:  Negative for abdominal pain, nausea and vomiting.   Genitourinary:  Negative for decreased urine volume.   Musculoskeletal:  Negative for arthralgias and myalgias.   Skin:  Negative for color change and rash.   Neurological:  Negative for dizziness, weakness, light-headedness, numbness and headaches.   Psychiatric/Behavioral:  Negative for agitation, dysphoric mood, self-injury, sleep disturbance and suicidal ideas. The patient is nervous/anxious.      Pertinent Medical History         Medical History Reviewed by provider this encounter:  Allergies  Meds  Problems       Past Medical History   Past Medical History:   Diagnosis Date    Anemia     Anxiety     Gestational diabetes 2/27/2023     Past Surgical History:   Procedure Laterality Date    WISDOM TOOTH EXTRACTION       Family History   Problem Relation Age of Onset    Hypertension Mother     Stroke Maternal Grandfather       Current Outpatient Medications on File Prior to Visit   Medication Sig Dispense Refill    fluticasone (FLONASE) 50 mcg/act nasal spray SPRAY 1 SPRAY INTO EACH NOSTRIL EVERY DAY 48 mL 1    [DISCONTINUED] sertraline (ZOLOFT) 25 mg tablet Take 1 tablet (25 mg total) by mouth daily 30 tablet 0    Iron-Vitamin C  MG TABS Take 1 tablet by mouth in the morning (Patient not taking: Reported on 6/26/2024) 90 tablet 0    [DISCONTINUED] ergocalciferol (VITAMIN D2) 50,000 units Take 1 capsule (50,000 Units total) by mouth once a week for 12 doses 12 capsule 0    [DISCONTINUED] Lo Loestrin Fe 1 MG-10 MCG / 10 MCG TABS Take 1 tablet by mouth daily 84 tablet 1     No current facility-administered medications on file prior to visit.   No Known Allergies   Current Outpatient Medications on File Prior to Visit   Medication Sig Dispense Refill    fluticasone (FLONASE) 50 mcg/act nasal spray SPRAY 1 SPRAY INTO EACH NOSTRIL EVERY DAY 48 mL 1    [DISCONTINUED] sertraline (ZOLOFT) 25 mg tablet Take 1 tablet (25 mg total) by mouth daily 30 tablet 0    Iron-Vitamin C  MG TABS Take 1 tablet by mouth in the morning (Patient not taking: Reported on 6/26/2024) 90 tablet 0    [DISCONTINUED] ergocalciferol (VITAMIN D2) 50,000 units Take 1 capsule (50,000 Units total) by mouth once a week for 12 doses 12 capsule 0    [DISCONTINUED] Lo Loestrin Fe 1 MG-10 MCG / 10 MCG TABS Take 1 tablet by mouth daily 84 tablet 1     No current facility-administered medications on file prior to visit.      Social History     Tobacco Use    Smoking status: Never     Passive exposure: Never    Smokeless tobacco: Never   Vaping Use    Vaping status: Never Used   Substance and Sexual Activity    Alcohol use: Not Currently    Drug use: Never    Sexual activity: Yes     Partners: Male     Birth control/protection: Other, None     Comment: the pill lo estrine     Objective     /80 (BP Location: Left arm, Patient Position: Sitting)   Pulse 77   Resp  "18   Ht 5' 7\" (1.702 m)   Wt 83.2 kg (183 lb 6.4 oz)   SpO2 98%   BMI 28.72 kg/m²     Physical Exam  Vitals reviewed.   Constitutional:       General: She is not in acute distress.     Appearance: Normal appearance. She is not ill-appearing.   HENT:      Head: Normocephalic and atraumatic.      Right Ear: External ear normal.      Left Ear: External ear normal.      Mouth/Throat:      Mouth: Mucous membranes are moist.      Pharynx: Oropharynx is clear.   Eyes:      Conjunctiva/sclera: Conjunctivae normal.      Pupils: Pupils are equal, round, and reactive to light.   Cardiovascular:      Rate and Rhythm: Normal rate and regular rhythm.      Pulses: Normal pulses.      Heart sounds: Normal heart sounds.   Pulmonary:      Effort: Pulmonary effort is normal.      Breath sounds: Normal breath sounds.   Musculoskeletal:         General: Normal range of motion.      Cervical back: Normal range of motion and neck supple.   Skin:     General: Skin is warm and dry.   Neurological:      General: No focal deficit present.      Mental Status: She is alert and oriented to person, place, and time.   Psychiatric:         Attention and Perception: Attention and perception normal. She does not perceive auditory or visual hallucinations.         Mood and Affect: Mood and affect normal.         Speech: Speech normal.         Behavior: Behavior normal. Behavior is cooperative.         Thought Content: Thought content normal. Thought content does not include homicidal or suicidal ideation.       Administrative Statements     "

## 2024-06-26 NOTE — ASSESSMENT & PLAN NOTE
Denies SI/HI.  Patient recently restarted on sertraline week ago.  Has had effectiveness in the past, but feels at this time, anxiety has been prevalent, mainly in the morning.  Discussed with patient medication may take up to 12 weeks to be effective.  Will initiate buspirone to be taken once to twice daily if needed.  Advised to return to office in 4 weeks to reevaluate effectiveness of sertraline.

## 2024-06-26 NOTE — ASSESSMENT & PLAN NOTE
Patient completed loading dose of vitamin D, but never started daily supplementation.  Prescription for daily vitamin D given to patient.  Advised if it is not covered, can get over-the-counter.  Advised to take medication with food for better absorption

## 2024-08-20 DIAGNOSIS — F32.A ANXIETY AND DEPRESSION: ICD-10-CM

## 2024-08-20 DIAGNOSIS — F41.9 ANXIETY AND DEPRESSION: ICD-10-CM

## 2024-08-20 RX ORDER — SERTRALINE HYDROCHLORIDE 25 MG/1
25 TABLET, FILM COATED ORAL DAILY
Qty: 90 TABLET | Refills: 1 | Status: SHIPPED | OUTPATIENT
Start: 2024-08-20

## 2024-09-22 DIAGNOSIS — F32.A ANXIETY AND DEPRESSION: ICD-10-CM

## 2024-09-22 DIAGNOSIS — F41.9 ANXIETY AND DEPRESSION: ICD-10-CM

## 2024-09-23 RX ORDER — BUSPIRONE HYDROCHLORIDE 7.5 MG/1
7.5 TABLET ORAL 2 TIMES DAILY
Qty: 180 TABLET | Refills: 0 | Status: SHIPPED | OUTPATIENT
Start: 2024-09-23

## 2024-12-02 ENCOUNTER — TELEPHONE (OUTPATIENT)
Age: 39
End: 2024-12-02

## 2024-12-02 NOTE — TELEPHONE ENCOUNTER
Pt had pos UPT, lm p 11/5/24, needs d/v looked at [facility] and [facility], unable to schedule. Attempted to reach office, no one available, message sent for scheduling assistance.

## 2024-12-25 DIAGNOSIS — F41.9 ANXIETY AND DEPRESSION: ICD-10-CM

## 2024-12-25 DIAGNOSIS — F32.A ANXIETY AND DEPRESSION: ICD-10-CM

## 2024-12-26 RX ORDER — BUSPIRONE HYDROCHLORIDE 7.5 MG/1
7.5 TABLET ORAL 2 TIMES DAILY
Qty: 60 TABLET | Refills: 0 | Status: SHIPPED | OUTPATIENT
Start: 2024-12-26

## 2025-01-07 ENCOUNTER — ULTRASOUND (OUTPATIENT)
Age: 40
End: 2025-01-07
Payer: COMMERCIAL

## 2025-01-07 VITALS — DIASTOLIC BLOOD PRESSURE: 72 MMHG | BODY MASS INDEX: 34.3 KG/M2 | SYSTOLIC BLOOD PRESSURE: 116 MMHG | WEIGHT: 219 LBS

## 2025-01-07 DIAGNOSIS — N91.2 AMENORRHEA: Primary | ICD-10-CM

## 2025-01-07 DIAGNOSIS — Z34.90 EARLY STAGE OF PREGNANCY: ICD-10-CM

## 2025-01-07 DIAGNOSIS — O26.891 PREGNANCY HEADACHE IN FIRST TRIMESTER: ICD-10-CM

## 2025-01-07 DIAGNOSIS — R51.9 PREGNANCY HEADACHE IN FIRST TRIMESTER: ICD-10-CM

## 2025-01-07 DIAGNOSIS — O21.9 NAUSEA AND VOMITING IN PREGNANCY: ICD-10-CM

## 2025-01-07 PROBLEM — Z86.32 HISTORY OF GESTATIONAL DIABETES IN PRIOR PREGNANCY, CURRENTLY PREGNANT: Status: ACTIVE | Noted: 2023-02-27

## 2025-01-07 PROBLEM — O98.512 COVID-19 AFFECTING PREGNANCY IN SECOND TRIMESTER: Status: RESOLVED | Noted: 2021-02-05 | Resolved: 2025-01-07

## 2025-01-07 PROBLEM — U07.1 COVID-19 AFFECTING PREGNANCY IN SECOND TRIMESTER: Status: RESOLVED | Noted: 2021-02-05 | Resolved: 2025-01-07

## 2025-01-07 PROBLEM — O09.299 HISTORY OF GESTATIONAL DIABETES IN PRIOR PREGNANCY, CURRENTLY PREGNANT: Status: ACTIVE | Noted: 2023-02-27

## 2025-01-07 PROCEDURE — 76817 TRANSVAGINAL US OBSTETRIC: CPT | Performed by: STUDENT IN AN ORGANIZED HEALTH CARE EDUCATION/TRAINING PROGRAM

## 2025-01-07 PROCEDURE — 99204 OFFICE O/P NEW MOD 45 MIN: CPT | Performed by: STUDENT IN AN ORGANIZED HEALTH CARE EDUCATION/TRAINING PROGRAM

## 2025-01-07 RX ORDER — METOCLOPRAMIDE 10 MG/1
10 TABLET ORAL EVERY 6 HOURS PRN
Qty: 30 TABLET | Refills: 0 | Status: SHIPPED | OUTPATIENT
Start: 2025-01-07

## 2025-01-07 NOTE — PROGRESS NOTES
"Pt is here for early ultrasound   Headache for the last 3wks, lightheaded \"feels like she is going to pass out\" trying to stay hydrated \"not the best at that\"       LMP 11/3  Regular Cycles   9 weeks 2 days   SHAWN 8/10/25  Planned Pregnancy   No Vaginal Bleeding   Nausea but no Vomiting  Blood Type A +     Complications with previous pregnancy/delivery   Anemia in both pregnancy's   Gestational diabetes 2nd pregnancy diagnosed at 36wks   Postpartum hemorrhage x2      Ultrasound Probe Disinfection    A transvaginal ultrasound was performed.   Prior to use, disinfection was performed with High Level Disinfection Process ([employer])  Probe serial number [id number]:  [id number] was used    JOLANTA ZAMBRANO MA  25  10:36 AM      "

## 2025-01-07 NOTE — PROGRESS NOTES
Name: Alexandra Espinosa      : 1985      MRN: [id number]  Encounter Provider: María Elena Garcia MD  Encounter Date: 2025   Encounter department: [facility] [place]  :  Assessment & Plan  Amenorrhea    Orders:    AMB US OB < 14 weeks single or first gestation level 1    Early stage of pregnancy  Early pregnancy at 9 weeks 2 days with a calculated SHAWN of August 10, 2025 based on LMP c/w early ultrasound    - Genetic screening options reviewed. She is interested in NIPT, so MFM referral placed  - Prenatal care reviewed  - Pregnancy precautions reviewed    RTO for OB interview and PN-1 visit    Orders:    Ambulatory Referral to Maternal Fetal Medicine; Future    Nausea and vomiting in pregnancy  Safe and effective use reviewed  Orders:    metoclopramide (Reglan) 10 mg tablet; Take 1 tablet (10 mg total) by mouth every 6 (six) hours as needed (nausea/headache)    Pregnancy headache in first trimester  Good water intake. Recommend tylenol PRN, as this has helped. Can trial reglan also   Orders:    metoclopramide (Reglan) 10 mg tablet; Take 1 tablet (10 mg total) by mouth every 6 (six) hours as needed (nausea/headache)        History of Present Illness   HPI  Alexandra Espinosa is a 39 y.o. female who presents today for verification of pregnancy.  female, Patient's last menstrual period was 11/3/2024 (approximate). Menses are regular. This pregnancy was not prevented, she is happy about it    OBHx is significant for 2 term vaginal deliveries. GDM in her second pregnancy that was not diagnosed until 36 weeks. She notes that 3 different 1hr gtts were normal, so a 3hr was done, which was abnormal. Anemia and postpartum hemorrhage with both. P2 with delayed PP hemorrhage requiring ED visit at [facility].     Taking a prenatal vitamin.     Headache - drinking per day        Review of Systems       Objective   /72 (BP Location: Right arm, Patient  Position: Sitting, Cuff Size: Large)   Wt 99.3 kg (219 lb)   LMP 11/03/2024 (Exact Date)   BMI 34.30 kg/m²      Physical Exam  Vitals and nursing note reviewed.   Constitutional:       General: She is not in acute distress.     Appearance: She is well-developed.   HENT:      Head: Normocephalic and atraumatic.   Cardiovascular:      Rate and Rhythm: Normal rate.   Pulmonary:      Effort: Pulmonary effort is normal. No respiratory distress.   Genitourinary:     General: Normal vulva.      Cervix: No cervical motion tenderness or cervical bleeding.   Skin:     General: Skin is warm and dry.      Capillary Refill: Capillary refill takes less than 2 seconds.   Neurological:      Mental Status: She is alert.   Psychiatric:         Mood and Affect: Mood normal.

## 2025-01-08 ENCOUNTER — TELEPHONE (OUTPATIENT)
Age: 40
End: 2025-01-08

## 2025-01-09 DIAGNOSIS — F41.9 ANXIETY AND DEPRESSION: ICD-10-CM

## 2025-01-09 DIAGNOSIS — F32.A ANXIETY AND DEPRESSION: ICD-10-CM

## 2025-01-10 RX ORDER — BUSPIRONE HYDROCHLORIDE 7.5 MG/1
7.5 TABLET ORAL 2 TIMES DAILY
Qty: 180 TABLET | Refills: 1 | Status: SHIPPED | OUTPATIENT
Start: 2025-01-10

## 2025-01-14 NOTE — PATIENT INSTRUCTIONS
Congratulations!! Please review our Pregnancy Essential Guide and [facility] L&D Virtual tour from our networks website.     [facility] Pregnancy Essentials Guide  [facility] ([email or web address])     [facility] (mobintent)

## 2025-01-15 ENCOUNTER — INITIAL PRENATAL (OUTPATIENT)
Dept: OBGYN CLINIC | Facility: CLINIC | Age: 40
End: 2025-01-15

## 2025-01-15 DIAGNOSIS — Z83.3 FAMILY HISTORY OF GESTATIONAL DIABETES MELLITUS (GDM) IN MOTHER: ICD-10-CM

## 2025-01-15 DIAGNOSIS — Z34.81 PRENATAL CARE, SUBSEQUENT PREGNANCY, FIRST TRIMESTER: Primary | ICD-10-CM

## 2025-01-15 DIAGNOSIS — Z13.9 ENCOUNTER FOR SCREENING INVOLVING SOCIAL DETERMINANTS OF HEALTH (SDOH): ICD-10-CM

## 2025-01-15 DIAGNOSIS — Z31.430 ENCOUNTER OF FEMALE FOR TESTING FOR GENETIC DISEASE CARRIER STATUS FOR PROCREATIVE MANAGEMENT: ICD-10-CM

## 2025-01-15 DIAGNOSIS — Z36.9 ENCOUNTER FOR ANTENATAL SCREENING: ICD-10-CM

## 2025-01-15 PROBLEM — Z3A.11 11 WEEKS GESTATION OF PREGNANCY: Status: ACTIVE | Noted: 2025-01-15

## 2025-01-15 PROCEDURE — OBC

## 2025-01-15 RX ORDER — MULTIVITAMIN WITH IRON
500 TABLET ORAL DAILY
COMMUNITY

## 2025-01-15 NOTE — PROGRESS NOTES
OB INTAKE INTERVIEW  Patient is 39 y.o. who presents for OB intake at 10 3/7 wks  This was a PHONE encounter.  The father of her baby Baltazar De La Cruz is involved in the pregnancy and is 39 years old.      Last Menstrual Period: 11/3/2024   Ultrasound: Measured 9 weeks 2 days on 2025  Estimated Date of Delivery: 8/10/2025 confirmed by 9 week US    Signs/Symptoms of Pregnancy  Current pregnancy symptoms: Nausea  Constipation no  Headaches YES-Tylenol helps  Cramping/spotting no  PICA cravings no    Diabetes-  There is no height or weight on file to calculate BMI.  If patient has 1 or more, please order early 1 hour GTT  History of GDM YES  BMI >35 no  History of PCOS or current metformin use (should stop for 7 days prior to 1hr GTT unless pre-existing diabetes)  no  History of LGA/macrosomic infant (4000g/9lbs) no    If patient has 2 or more, please order early 1 hour GTT  BMI>30 YES  AMA YES  First degree relative with type 2 diabetes no  History of chronic HTN, hyperlipidemia, elevated A1C no  High risk race (, , ,  or ) no    Hypertension- if you answer yes to any of the following, please order baseline preeclampsia labs (cbc, comprehensive metabolic panel, urine protein creatinine ratio, uric acid)  History of of chronic HTN no  History of gestational HTN no  History of preeclampsia, eclampsia, or HELLP syndrome no  History of diabetes YES-GDM  History of lupus,sjogrens syndrome, kidney disease no    Thyroid- if yes order TSH with reflex T4  History of thyroid disease no    Bleeding Disorder or Hx of DVT-patient or first degree relative with history of. Order the following if not done previously.   (Factor V, antithrombin III, prothrombin gene mutation, protein C and S Ag, lupus anticoagulant, anticardiolipin, beta-2 glycoprotein)   no    OB/GYN-  History of abnormal pap smear no       Date of last pap smear 2018  History of HPV no  History of  Herpes/HSV no  History of other STI (gonorrhea, chlamydia, trich) no  History of prior  YES x2  History of prior  no  History of  delivery prior to 36 weeks 6 days no  History of Varicella or Vaccination Vaccine  History of blood transfusion no  Ok for blood transfusion YES    Substance screening-   History of tobacco use no  Currently using tobacco no  Substance Use Screen Level (N/A, LOW, HIGH) N/A    MRSA Screening-   Does the pt have a hx of MRSA? no    Immunizations:  Influenza vaccine given this season Interested in Vaccine for next appointment  Discussed Tdap vaccine YES  Discussed COVID Vaccine YES    Genetic/[facility]-  Do you or your partner have a history of any of the following in yourselves or first degree relatives?  Cystic fibrosis no  Spinal muscular atrophy no  Hemoglobinopathy/Sickle Cell/Thalassemia no  Fragile X Intellectual Disability no      If no, discuss Carrier Screening being completed once in a lifetime as a standard of care lab test. Place orders for Cystic Fibrosis Gene Test (BYL764) and Spinal Muscular Atrophy DNA (CBP0393) Patient declined screening.      Appointment for Nuchal Translucency Ultrasound at [facility] scheduled for 25      Interview education  [facility] Pregnancy Essentials Book reviewed, discussed and attached to their AVS YES    Nurse/Family Partnership- patient may qualify NO; referral placed NO    Prenatal lab work scripts YES  Extra labs ordered:  1 hr GTT  Pre- E labs    Aspirin/Preeclampsia Screen    Risk Level Risk Factor Recommendation   LOW Prior Uncomplicated full-term delivery YES No Aspirin recommendation        MODERATE Nulliparity no Recommend low-dose aspirin if     BMI>30 YES 2 or more moderate risk factors    Family History Preeclampsia (mother/sister) no     35yr old or greater YES     Black Race, Concern for SDOH/Low Socioeconomic no     IVF Pregnancy  no     Personal History Risks (low birth weight, prior adverse preg outcome, >10yr preg  interval) no         HIGH History of Preeclampsia no Recommend low-dose aspirin if     Multifetal gestation no 1 or more high risk factors    Chronic HTN no     Type 1 or 2 Diabetes no     Renal Disease no     Autoimmune Disease  no      Contraindications to ASA therapy:  NSAID/ ASA allergy: no  Nasal polyps: no  Asthma with history of ASA induced bronchospasm: no  Relative contraindications:  History of GI bleed: no  Active peptic ulcer disease: no  Severe hepatic dysfunction: no    Patient should be recommended to take ASA 162mg during this pregnancy from 12-36wks to lower her risk of preeclampsia: ASA therapy was discussed and patient verbalized understanding.          The patient has a history now or in prior pregnancy notable for:  GDM-diet controlled in 2023, PP hemorrhage in 2021 and 2023, Anxiety-EPDS-13 (#10-Never)      Details that I feel the provider should be aware of: This intake was a Phone encounter. This is an unplanned however welcomed pregnancy for parents. Patient is experiencing some nausea and headaches. Patient states she does not take Reglan because it makes her sleepy. OTC medications and diet were discussed. Patient has a history of diet controlled GDM in 2023. 1 hr gtt was ordered. Pre-E labs were ordered per screen. Patient takes Zoloft for her anxiety. Patient declined referral to therapy for EPDS-13 stating she will reach out to employment therapy. Patient is aware of baby and me support center and knows she can always reach out if she would like a referral for therapy. Patient is most concerned with having another PP hemorrhage with this pregnancy. Patient states she did not get any blood transfusions after both PP hemorrhages. Patient knows to call OB for symptoms and how to contact her nurse navigator. Patient was made aware to have lab orders completed before next appointment. Patient denies any questions at this point and verbalizes understanding.           PN1 visit scheduled. The  patient was oriented to our practice, the navigator role, reviewed delivering physicians and [facility] for Delivery. All questions were answered.    Interviewed by: Lizette Barrera RN

## 2025-01-16 ENCOUNTER — PATIENT OUTREACH (OUTPATIENT)
Age: 40
End: 2025-01-16

## 2025-01-16 NOTE — PROGRESS NOTES
SW referred for SDOH (housing, utilities, food) and EPDS (13). Per OB Navigator, patient not currently interested in therapy but does take Zoloft for anxiety. Patient is , [id number] with an SHAWN of 8/10/25.     SW called patient to complete assessment, no answer. SW left VM requesting a call back. Patient's next appointment is scheduled for 25 with KINJAL Bailon. Note routed to provider and OB Navigator.

## 2025-01-20 ENCOUNTER — TELEPHONE (OUTPATIENT)
Age: 40
End: 2025-01-20

## 2025-01-22 ENCOUNTER — PATIENT OUTREACH (OUTPATIENT)
Age: 40
End: 2025-01-22

## 2025-01-22 NOTE — PROGRESS NOTES
Second attempt to call patient to contact patient regarding SDOH (housing, utilities, food) and EPDS (13). No answer - SW left VM requesting a call back. Patient's next appt is scheduled for 1/28/25 with KINJAL Campos.     Due to lack of response from patient, SW sent UTR letter via [employer]. SW routed note to OB Navigator pool & provider. SW closing referral at this time, please re-refer as needed.     Addendum-    Patient called SW back and left VM stating she is still interested in  services. SW placed f/u call to patient to complete assessment.     Patient reports this pregnancy was somewhat planned, somewhat of a surprise. She and FOB ( Baltazar) have two other children. Patient is sole income for household - FOB planning to apply for disability due to complex back injury. Patient states money can be tight due to high rent cost, food, and electric bill. Also has high deductible for insurance, and her plan does not cover ultrasounds. Up to date on rent, but behind on electric/heating bill. Has WIC appt on 2/5 to add baby - two other kids already receive WIC.     SW encouraged patient to contact her insurance to confirm they are unable to assist with cost of ultrasounds. Patient states she will confirm, and then call [facility] billing to set up a payment plan. Submitted an application for MA/SNAP/LIHEAP today on [employer] - was previously denied last fall but now has higher income limits due to pregnancy. Did indicate she is pregnant on [employer] application and requested coverage for unpaid bills within last 90 days.     Expressed interest in food pantry resources - sent via [employer]. Also sent info for [employer] Helpline for possible rental assistance/utility assistance information. Already applied for LIHEAP and is on payment plan through [employer], although she was told she will owe money at the end of the year if full bills are not paid now.     Denies current or h/o substance use, DV/IPV, CYS  Involvement, and legal concerns. Reports h/o anxiety - managed on Zoloft through PCP. Has therapy offered through work if needed, denies interest in therapy at this time. Has good support from FOB, family, and friends. Enjoys relaxing at home & watching TV for stress relief.     No other needs reported today, SW to call patient in two weeks for any updates on application status.

## 2025-01-22 NOTE — LETTER
January 22, 2025    Alexandra Espinosa  [email or web address]  [place] [address]        Elton Morris,      I hope you are doing well. I am a  with [facility]. I am reaching out because I have made a few attempts to contact you by phone. Unfortunately, I have not been able to reach you! I apologize if I have been calling at a bad time.       If you are still interested in social work services or resources, I kindly request that you contact me at [phone] so that I can assist with your care needs. My normal hours are Monday through Friday, 8:00am to 4:30pm.      Take care,      CARLA Buck, LSW

## 2025-01-23 ENCOUNTER — TELEPHONE (OUTPATIENT)
Dept: OBGYN CLINIC | Facility: CLINIC | Age: 40
End: 2025-01-23

## 2025-01-24 PROBLEM — O09.299 HISTORY OF POSTPARTUM HEMORRHAGE, CURRENTLY PREGNANT: Status: ACTIVE | Noted: 2025-01-24

## 2025-01-24 PROBLEM — Z3A.12 12 WEEKS GESTATION OF PREGNANCY: Status: ACTIVE | Noted: 2025-01-24

## 2025-01-27 ENCOUNTER — TELEPHONE (OUTPATIENT)
Age: 40
End: 2025-01-27

## 2025-01-27 NOTE — TELEPHONE ENCOUNTER
Patient called to reschedule initial ob visit on 1/28/25.  Patient states her car broke down.  She will have her car back on 1/29/25.  Any day after 1/29/25 will be good.  Patient prefers [place] office, but she will go to [place] if need be.

## 2025-01-30 ENCOUNTER — APPOINTMENT (OUTPATIENT)
Dept: LAB | Facility: CLINIC | Age: 40
End: 2025-01-30
Payer: COMMERCIAL

## 2025-01-30 DIAGNOSIS — Z83.3 FAMILY HISTORY OF GESTATIONAL DIABETES MELLITUS (GDM) IN MOTHER: ICD-10-CM

## 2025-01-30 DIAGNOSIS — Z34.81 PRENATAL CARE, SUBSEQUENT PREGNANCY, FIRST TRIMESTER: ICD-10-CM

## 2025-01-30 LAB
ABO GROUP BLD: NORMAL
ALBUMIN SERPL BCG-MCNC: 3.8 G/DL (ref 3.5–5)
ALP SERPL-CCNC: 46 U/L (ref 34–104)
ALT SERPL W P-5'-P-CCNC: 13 U/L (ref 7–52)
ANION GAP SERPL CALCULATED.3IONS-SCNC: 10 MMOL/L (ref 4–13)
AST SERPL W P-5'-P-CCNC: 13 U/L (ref 13–39)
BACTERIA UR QL AUTO: ABNORMAL /HPF
BASOPHILS # BLD AUTO: 0.04 THOUSANDS/ΜL (ref 0–0.1)
BASOPHILS NFR BLD AUTO: 0 % (ref 0–1)
BILIRUB SERPL-MCNC: 1.15 MG/DL (ref 0.2–1)
BILIRUB UR QL STRIP: NEGATIVE
BLD GP AB SCN SERPL QL: NEGATIVE
BUN SERPL-MCNC: 8 MG/DL (ref 5–25)
CALCIUM SERPL-MCNC: 9.4 MG/DL (ref 8.4–10.2)
CHLORIDE SERPL-SCNC: 100 MMOL/L (ref 96–108)
CLARITY UR: CLEAR
CO2 SERPL-SCNC: 24 MMOL/L (ref 21–32)
COLOR UR: YELLOW
CREAT SERPL-MCNC: 0.53 MG/DL (ref 0.6–1.3)
CREAT UR-MCNC: 171.3 MG/DL
EOSINOPHIL # BLD AUTO: 0.04 THOUSAND/ΜL (ref 0–0.61)
EOSINOPHIL NFR BLD AUTO: 0 % (ref 0–6)
ERYTHROCYTE [DISTWIDTH] IN BLOOD BY AUTOMATED COUNT: 12.6 % (ref 11.6–15.1)
GFR SERPL CREATININE-BSD FRML MDRD: 121 ML/MIN/1.73SQ M
GLUCOSE 1H P 50 G GLC PO SERPL-MCNC: 115 MG/DL (ref 70–134)
GLUCOSE SERPL-MCNC: 94 MG/DL (ref 65–140)
GLUCOSE UR STRIP-MCNC: NEGATIVE MG/DL
HCT VFR BLD AUTO: 39.6 % (ref 34.8–46.1)
HGB BLD-MCNC: 12.3 G/DL (ref 11.5–15.4)
HGB UR QL STRIP.AUTO: ABNORMAL
IMM GRANULOCYTES # BLD AUTO: 0.04 THOUSAND/UL (ref 0–0.2)
IMM GRANULOCYTES NFR BLD AUTO: 0 % (ref 0–2)
KETONES UR STRIP-MCNC: NEGATIVE MG/DL
LEUKOCYTE ESTERASE UR QL STRIP: NEGATIVE
LYMPHOCYTES # BLD AUTO: 1.57 THOUSANDS/ΜL (ref 0.6–4.47)
LYMPHOCYTES NFR BLD AUTO: 17 % (ref 14–44)
MCH RBC QN AUTO: 29.8 PG (ref 26.8–34.3)
MCHC RBC AUTO-ENTMCNC: 31.1 G/DL (ref 31.4–37.4)
MCV RBC AUTO: 96 FL (ref 82–98)
MONOCYTES # BLD AUTO: 0.6 THOUSAND/ΜL (ref 0.17–1.22)
MONOCYTES NFR BLD AUTO: 6 % (ref 4–12)
MUCOUS THREADS UR QL AUTO: ABNORMAL
NEUTROPHILS # BLD AUTO: 7.03 THOUSANDS/ΜL (ref 1.85–7.62)
NEUTS SEG NFR BLD AUTO: 77 % (ref 43–75)
NITRITE UR QL STRIP: NEGATIVE
NON-SQ EPI CELLS URNS QL MICRO: ABNORMAL /HPF
NRBC BLD AUTO-RTO: 0 /100 WBCS
PH UR STRIP.AUTO: 6.5 [PH]
PLATELET # BLD AUTO: 403 THOUSANDS/UL (ref 149–390)
PMV BLD AUTO: 10 FL (ref 8.9–12.7)
POTASSIUM SERPL-SCNC: 3.3 MMOL/L (ref 3.5–5.3)
PROT SERPL-MCNC: 6.9 G/DL (ref 6.4–8.4)
PROT UR STRIP-MCNC: ABNORMAL MG/DL
PROT UR-MCNC: 9.8 MG/DL
PROT/CREAT UR: 0.1 MG/G{CREAT} (ref 0–0.1)
RBC # BLD AUTO: 4.13 MILLION/UL (ref 3.81–5.12)
RBC #/AREA URNS AUTO: ABNORMAL /HPF
RH BLD: POSITIVE
RUBV IGG SERPL IA-ACNC: 235.3 IU/ML
SODIUM SERPL-SCNC: 134 MMOL/L (ref 135–147)
SP GR UR STRIP.AUTO: 1.03 (ref 1–1.03)
URATE SERPL-MCNC: 3.4 MG/DL (ref 2–7.5)
UROBILINOGEN UR STRIP-ACNC: <2 MG/DL
WBC # BLD AUTO: 9.32 THOUSAND/UL (ref 4.31–10.16)
WBC #/AREA URNS AUTO: ABNORMAL /HPF

## 2025-01-30 PROCEDURE — 82570 ASSAY OF URINE CREATININE: CPT

## 2025-01-30 PROCEDURE — 86850 RBC ANTIBODY SCREEN: CPT

## 2025-01-30 PROCEDURE — 36415 COLL VENOUS BLD VENIPUNCTURE: CPT

## 2025-01-30 PROCEDURE — 87340 HEPATITIS B SURFACE AG IA: CPT

## 2025-01-30 PROCEDURE — 87389 HIV-1 AG W/HIV-1&-2 AB AG IA: CPT

## 2025-01-30 PROCEDURE — 80053 COMPREHEN METABOLIC PANEL: CPT

## 2025-01-30 PROCEDURE — 86901 BLOOD TYPING SEROLOGIC RH(D): CPT

## 2025-01-30 PROCEDURE — 84550 ASSAY OF BLOOD/URIC ACID: CPT

## 2025-01-30 PROCEDURE — 84156 ASSAY OF PROTEIN URINE: CPT

## 2025-01-30 PROCEDURE — 85025 COMPLETE CBC W/AUTO DIFF WBC: CPT

## 2025-01-30 PROCEDURE — 87086 URINE CULTURE/COLONY COUNT: CPT

## 2025-01-30 PROCEDURE — 86803 HEPATITIS C AB TEST: CPT

## 2025-01-30 PROCEDURE — 81001 URINALYSIS AUTO W/SCOPE: CPT

## 2025-01-30 PROCEDURE — 86780 TREPONEMA PALLIDUM: CPT

## 2025-01-30 PROCEDURE — 86762 RUBELLA ANTIBODY: CPT

## 2025-01-30 PROCEDURE — 82950 GLUCOSE TEST: CPT

## 2025-01-30 PROCEDURE — 86900 BLOOD TYPING SEROLOGIC ABO: CPT

## 2025-01-31 ENCOUNTER — ROUTINE PRENATAL (OUTPATIENT)
Dept: PERINATAL CARE | Facility: OTHER | Age: 40
End: 2025-01-31
Payer: COMMERCIAL

## 2025-01-31 VITALS
SYSTOLIC BLOOD PRESSURE: 114 MMHG | WEIGHT: 219 LBS | BODY MASS INDEX: 34.37 KG/M2 | HEIGHT: 67 IN | HEART RATE: 83 BPM | DIASTOLIC BLOOD PRESSURE: 70 MMHG

## 2025-01-31 DIAGNOSIS — Z3A.12 12 WEEKS GESTATION OF PREGNANCY: ICD-10-CM

## 2025-01-31 DIAGNOSIS — O09.521 AMA (ADVANCED MATERNAL AGE) MULTIGRAVIDA 35+, FIRST TRIMESTER: Primary | ICD-10-CM

## 2025-01-31 DIAGNOSIS — Z36.0 ENCOUNTER FOR ANTENATAL SCREENING FOR CHROMOSOMAL ANOMALIES: ICD-10-CM

## 2025-01-31 DIAGNOSIS — Z34.90 EARLY STAGE OF PREGNANCY: ICD-10-CM

## 2025-01-31 DIAGNOSIS — Z86.32 HISTORY OF GESTATIONAL DIABETES IN PRIOR PREGNANCY, CURRENTLY PREGNANT: ICD-10-CM

## 2025-01-31 DIAGNOSIS — Z36.82 ENCOUNTER FOR (NT) NUCHAL TRANSLUCENCY SCAN: ICD-10-CM

## 2025-01-31 DIAGNOSIS — Z36.9 ENCOUNTER FOR ANTENATAL SCREENING OF MOTHER: ICD-10-CM

## 2025-01-31 DIAGNOSIS — O09.299 HISTORY OF POSTPARTUM HEMORRHAGE, CURRENTLY PREGNANT: ICD-10-CM

## 2025-01-31 DIAGNOSIS — O09.299 HISTORY OF GESTATIONAL DIABETES IN PRIOR PREGNANCY, CURRENTLY PREGNANT: ICD-10-CM

## 2025-01-31 DIAGNOSIS — O99.211 OBESITY AFFECTING PREGNANCY IN FIRST TRIMESTER, UNSPECIFIED OBESITY TYPE: ICD-10-CM

## 2025-01-31 LAB
HBV SURFACE AG SER QL: NORMAL
HCV AB SER QL: NORMAL
HIV 1+2 AB+HIV1 P24 AG SERPL QL IA: NORMAL
HIV 2 AB SERPL QL IA: NORMAL
HIV1 AB SERPL QL IA: NORMAL
HIV1 P24 AG SERPL QL IA: NORMAL
TREPONEMA PALLIDUM IGG+IGM AB [PRESENCE] IN SERUM OR PLASMA BY IMMUNOASSAY: NORMAL

## 2025-01-31 PROCEDURE — 36415 COLL VENOUS BLD VENIPUNCTURE: CPT | Performed by: OBSTETRICS & GYNECOLOGY

## 2025-01-31 PROCEDURE — 76813 OB US NUCHAL MEAS 1 GEST: CPT | Performed by: OBSTETRICS & GYNECOLOGY

## 2025-01-31 PROCEDURE — 99243 OFF/OP CNSLTJ NEW/EST LOW 30: CPT | Performed by: OBSTETRICS & GYNECOLOGY

## 2025-01-31 RX ORDER — ASPIRIN 81 MG/1
162 TABLET ORAL DAILY
Qty: 60 TABLET | Refills: 3 | Status: SHIPPED | OUTPATIENT
Start: 2025-01-31 | End: 2025-02-14

## 2025-01-31 NOTE — PROGRESS NOTES
Patient chose to have [facility] [id number] Non-Invasive Prenatal Screen [id number] PLUS w/ SCA, WITH fetal sex.  Patient choose to be billed through insurance.     Patient given brochure and is aware [facility] will contact patient's insurance and coordinate coverage.  Provided [facility] contact information. General inquiries [phone], Cost estimates [phone] and [facility] Billing [phone]. Website [employer].     Blood collection tubes labeled with patient identifiers (name, medical record number, and date of birth).     Filled out [facility] order form. Patient chose to have blood drawn in our office at time of visit. NIPS was drawn from left arm with a butterfly needle by Gayle ROLDAN MA.    Maternal Fetal Medicine will have results in approximately 5-7 business days and will call patient or notify via [employer].  Patient aware viewing lab result online will reveal fetal sex if ordered.    Patient verbalized understanding of all instructions and no questions at this time.

## 2025-01-31 NOTE — LETTER
"   Date: 2025    María Elena Garcia MD  [address]  [address]  [place] [address]    Patient: Alexandra Espinosa   YOB: 1985   Date of Visit: 2025   Gestational age 13w0d   Nature of this communication: Routine       This patient was seen recently in our  office.  Please see ultrasound report under \"OB Procedures\" tab.  Please don't hesitate to contact our office with any concerns or questions.      Sincerely,      Charo Morales MD  Attending Physician, Maternal-Fetal Medicine  [facility]      "

## 2025-02-01 LAB — BACTERIA UR CULT: NORMAL

## 2025-02-02 NOTE — PROGRESS NOTES
"[facility]: Ms. Espinosa was seen today for nuchal translucency ultrasound.  See ultrasound report under \"OB Procedures\" tab.      MDM:   I. Diagnoses/Problems addressed:  Stable chronic illness: BMI>30  II.  Data: I ordered the following tests: NIPS.  III.  Risk of morbidity: Moderate    Please don't hesitate to contact our office with any concerns or questions.  -Charo Morales MD      "

## 2025-02-03 ENCOUNTER — INITIAL PRENATAL (OUTPATIENT)
Dept: OBGYN CLINIC | Facility: CLINIC | Age: 40
End: 2025-02-03
Payer: COMMERCIAL

## 2025-02-03 VITALS
WEIGHT: 220.4 LBS | SYSTOLIC BLOOD PRESSURE: 102 MMHG | BODY MASS INDEX: 34.59 KG/M2 | HEIGHT: 67 IN | DIASTOLIC BLOOD PRESSURE: 70 MMHG | HEART RATE: 85 BPM

## 2025-02-03 DIAGNOSIS — Z11.3 SCREEN FOR STD (SEXUALLY TRANSMITTED DISEASE): ICD-10-CM

## 2025-02-03 DIAGNOSIS — F32.A ANXIETY AND DEPRESSION: ICD-10-CM

## 2025-02-03 DIAGNOSIS — Z11.51 SCREENING FOR HPV (HUMAN PAPILLOMAVIRUS): ICD-10-CM

## 2025-02-03 DIAGNOSIS — Z3A.13 13 WEEKS GESTATION OF PREGNANCY: ICD-10-CM

## 2025-02-03 DIAGNOSIS — Z23 NEEDS FLU SHOT: ICD-10-CM

## 2025-02-03 DIAGNOSIS — F41.9 ANXIETY AND DEPRESSION: ICD-10-CM

## 2025-02-03 DIAGNOSIS — Z01.419 ENCOUNTER FOR GYNECOLOGICAL EXAMINATION (GENERAL) (ROUTINE) WITHOUT ABNORMAL FINDINGS: ICD-10-CM

## 2025-02-03 DIAGNOSIS — Z34.82 PRENATAL CARE, SUBSEQUENT PREGNANCY, SECOND TRIMESTER: Primary | ICD-10-CM

## 2025-02-03 DIAGNOSIS — O09.521 AMA (ADVANCED MATERNAL AGE) MULTIGRAVIDA 35+, FIRST TRIMESTER: ICD-10-CM

## 2025-02-03 DIAGNOSIS — O09.299 HISTORY OF POSTPARTUM HEMORRHAGE, CURRENTLY PREGNANT: ICD-10-CM

## 2025-02-03 LAB
SL AMB  POCT GLUCOSE, UA: NORMAL
SL AMB POCT URINE PROTEIN: NORMAL

## 2025-02-03 PROCEDURE — 87491 CHLMYD TRACH DNA AMP PROBE: CPT

## 2025-02-03 PROCEDURE — 90656 IIV3 VACC NO PRSV 0.5 ML IM: CPT

## 2025-02-03 PROCEDURE — 87591 N.GONORRHOEAE DNA AMP PROB: CPT

## 2025-02-03 PROCEDURE — 81002 URINALYSIS NONAUTO W/O SCOPE: CPT

## 2025-02-03 PROCEDURE — G0476 HPV COMBO ASSAY CA SCREEN: HCPCS

## 2025-02-03 PROCEDURE — G0145 SCR C/V CYTO,THINLAYER,RESCR: HCPCS

## 2025-02-03 PROCEDURE — PNV

## 2025-02-03 PROCEDURE — 90471 IMMUNIZATION ADMIN: CPT

## 2025-02-03 NOTE — PROGRESS NOTES
"Name: Alexandra Espinosa      : 1985      MRN: [id number]  Encounter Provider: Hoda Woodward PA-C  Encounter Date: 2/3/2025   Encounter department: [facility]  :  Assessment & Plan  Prenatal care, subsequent pregnancy, second trimester  Patient should call if she experiences: vaginal bleeding, change in discharge, LOF, contractions or dysuria.  Discussed that pregnancy can increase their risk for blood clots. Discussed the warning signs: acute SOB, calf/leg pain, chest pain  Labs currently available were reviewed  Continue PNV  Patient feels safe at home  Seat belt use discussed  Childbirth classes/hospital facilities discussed    Orders:    POCT urine dip    Screening for HPV (human papillomavirus)    Orders:    Liquid-based pap, screening    Screen for STD (sexually transmitted disease)    Orders:    Chlamydia/GC amplified DNA by PCR    Needs flu shot    Orders:    influenza vaccine preservative-free 0.5 mL IM (Fluzone, Afluria, Fluarix, Flulaval)    History of postpartum hemorrhage, currently pregnant  Patient experienced hemorrhaging with both of her pregnancies.  1 during L&D the other in the postpartum period.    AMA (advanced maternal age) multigravida 35+, first trimester    Continue ASA    13 weeks gestation of pregnancy    Blood Type:A+  Pap :   GC/CT -  collected  PN1 Labs-  reviewed  28 Week Labs-     Flu vaccine - 2/3/25  Blue folder- has  Yellow folder-     NIPS- pending  AFP-     Level 2- 3/28/25     TDAP -      Delivery consent-  Breast pump -   Pediatrician -  L&D forms-     Perineal massage -  GBS swab -   IOL -      Anxiety and depression    Continue Zoloft  Stable         History of Present Illness   HPI  Alexandra Espinosa is a 39 y.o. female who presents for an initial PNV  39 y.o.  at 13w1d with Estimated Date of Delivery: 8/10/25 by LMP consistent w/ 1st trimester US.     Notes \"Light pink specks\"  (spotting) last week saw " MFM 25 imaging was WNL    She denies nausea/vomiting or bleeding/cramping.     OB history: 2  both complicated with hemorrhages     GYN history: Last pap smear 2018. no history of STDs.     Past medical history: anxiety/depression    Past surgical history: wisdom tooth extraction    Social history:  Denies tobacco, alcohol, or illicit drug use.    Family history:   DVT/PE: none  Cancer: none  Heart disease: mother, paternal grandmother  Stroke: maternal grandfather    Review of Systems   Constitutional:  Negative for chills, fatigue, fever and unexpected weight change.   Eyes:  Negative for visual disturbance.   Respiratory:  Negative for shortness of breath.    Cardiovascular:  Negative for chest pain and palpitations.   Gastrointestinal:  Negative for abdominal pain, anal bleeding, blood in stool, constipation, diarrhea, nausea and vomiting.   Endocrine: Negative for cold intolerance and heat intolerance.   Genitourinary:  Negative for difficulty urinating, dyspareunia, dysuria, frequency, hematuria, menstrual problem, pelvic pain, urgency, vaginal bleeding, vaginal discharge and vaginal pain.   Musculoskeletal:  Negative for back pain.   Skin:  Negative for rash.   Neurological:  Negative for dizziness, syncope, light-headedness and headaches.   Hematological:  Does not bruise/bleed easily.   Psychiatric/Behavioral:  Negative for dysphoric mood. The patient is not nervous/anxious.      Current Outpatient Medications on File Prior to Visit   Medication Sig Dispense Refill    aspirin (ECOTRIN LOW STRENGTH) 81 mg EC tablet Take 2 tablets (162 mg total) by mouth daily Stop at 36 weeks.  Contact your OB or MFM with any side effects.  See [email or web address].[email or web address] 60 tablet 3    Cholecalciferol (Vitamin D) 50 MCG (2000 UT) tablet Take 1 tablet (2,000 Units total) by mouth daily 90 tablet 1    Prenatal Vit-Fe Fumarate-FA (PRENATAL VITAMIN PO) Take 1 tablet by mouth in the morning      sertraline  "(ZOLOFT) 25 mg tablet TAKE 1 TABLET (25 MG TOTAL) BY MOUTH DAILY. 90 tablet 1    vitamin B-12 (VITAMIN B-12) 500 mcg tablet Take 500 mcg by mouth daily       No current facility-administered medications on file prior to visit.      Social History     Tobacco Use    Smoking status: Never     Passive exposure: Never    Smokeless tobacco: Never   Vaping Use    Vaping status: Never Used   Substance and Sexual Activity    Alcohol use: Not Currently    Drug use: Never    Sexual activity: Yes     Partners: Male     Birth control/protection: Other, None     Comment: the pill lo estrine        Objective   /70 (BP Location: Left arm, Patient Position: Sitting, Cuff Size: Large)   Pulse 85   Ht 5' 7\" (1.702 m)   Wt 100 kg (220 lb 6.4 oz)   LMP 11/03/2024 (Exact Date)   BMI 34.52 kg/m²      Physical Exam  Constitutional:       General: She is not in acute distress.     Appearance: Normal appearance. She is not ill-appearing.   Genitourinary:      Vulva normal.      No lesions in the vagina.      Right Labia: No rash, tenderness or lesions.     Left Labia: No tenderness, lesions or rash.     No inguinal adenopathy present in the right or left side.     No vaginal discharge, erythema, tenderness or bleeding.        Right Adnexa: not tender, not full and no mass present.     Left Adnexa: not tender, not full and no mass present.     No cervical motion tenderness, discharge or friability.      Uterus is not enlarged or tender.      No uterine mass detected.     No urethral discharge present.      Bladder is not tender.       Pelvic exam was performed with patient in the lithotomy position.   Breasts:     Right: No swelling, bleeding, inverted nipple, mass, nipple discharge, skin change or tenderness.      Left: No swelling, bleeding, inverted nipple, mass, nipple discharge, skin change or tenderness.   HENT:      Head: Normocephalic and atraumatic.      Nose: Nose normal.   Eyes:      Extraocular Movements: Extraocular " movements intact.      Conjunctiva/sclera: Conjunctivae normal.   Cardiovascular:      Rate and Rhythm: Normal rate and regular rhythm.      Heart sounds: Normal heart sounds.   Pulmonary:      Effort: Pulmonary effort is normal.      Breath sounds: Normal breath sounds. No stridor. No wheezing, rhonchi or rales.   Abdominal:      General: There is no distension.      Palpations: Abdomen is soft. There is no mass.      Tenderness: There is no abdominal tenderness. There is no guarding.   Lymphadenopathy:      Upper Body:      Right upper body: No supraclavicular or axillary adenopathy.      Left upper body: No supraclavicular or axillary adenopathy.      Lower Body: No right inguinal adenopathy. No left inguinal adenopathy.   Neurological:      Mental Status: She is alert.   Skin:     General: Skin is warm and dry.   Psychiatric:         Mood and Affect: Mood normal.         Behavior: Behavior normal.          Hoda Woodward PA-C

## 2025-02-03 NOTE — ASSESSMENT & PLAN NOTE
Patient experienced hemorrhaging with both of her pregnancies.  1 during L&D the other in the postpartum period.

## 2025-02-03 NOTE — ASSESSMENT & PLAN NOTE
Blood Type:A+  Pap : 2018  GC/CT -  collected  PN1 Labs-  reviewed  28 Week Labs-     Flu vaccine - 2/3/25  Blue folder- has  Yellow folder-     NIPS- pending  AFP-     Level 2- 3/28/25     TDAP -      Delivery consent-  Breast pump -   Pediatrician -  L&D forms-     Perineal massage -  GBS swab -   IOL -

## 2025-02-03 NOTE — PROGRESS NOTES
Patient presents for initial prenatal visit.     13W1D  SHAWN: 8/10/25  Last pap: 18-neg  Prenatal labs completed  Nuchal u/s:   Anatomy u/s: scheduled for 3/28  Blue folder given and reviewed at today's visit.  No LOF, bleeding, or discharge.  Has some cramping and little specs of light pink and after intercourse but knows she has a sensitive cervix.  No questions or concerns for today's visit.  Flu vaccine given in L deltoid. VIS given. Tolerated well.  Lot #: [id number]  Exp: 2025

## 2025-02-04 ENCOUNTER — PATIENT OUTREACH (OUTPATIENT)
Age: 40
End: 2025-02-04

## 2025-02-04 LAB
HPV HR 12 DNA CVX QL NAA+PROBE: NEGATIVE
HPV16 DNA CVX QL NAA+PROBE: NEGATIVE
HPV18 DNA CVX QL NAA+PROBE: NEGATIVE

## 2025-02-04 NOTE — PROGRESS NOTES
TRAE called patient to inquire about updates on her MA/SNAP/LIHEAP application, confirm she received Find Help resources, contacting [facility], and calling [employer]pline for rental/utility assistance. No answer - TRAE left VM requesting a call back. Patient's next appt is scheduled for 2/21/25 with Dr. Bobo.     Note routed to Ob Navigator & last seen provider EDSON Woodward.

## 2025-02-05 ENCOUNTER — RESULTS FOLLOW-UP (OUTPATIENT)
Dept: PERINATAL CARE | Facility: OTHER | Age: 40
End: 2025-02-05

## 2025-02-05 LAB
C TRACH DNA SPEC QL NAA+PROBE: NEGATIVE
CFDNA.FET/CFDNA.TOTAL SFR FETUS: NORMAL %
CITATION REF LAB TEST: NORMAL
FET 13+18+21+X+Y ANEUP PLAS.CFDNA: NEGATIVE
FET CHR 21 TS PLAS.CFDNA QL: NEGATIVE
FET CHR 21 TS PLAS.CFDNA QL: NEGATIVE
FET MS X RISK WBC.DNA+CFDNA QL: NOT DETECTED
FET SEX PLAS.CFDNA DOSAGE CFDNA: NORMAL
FET TS 13 RISK PLAS.CFDNA QL: NEGATIVE
FET X + Y ANEUP RISK PLAS.CFDNA SEQ-IMP: NOT DETECTED
GA EST FROM CONCEPTION DATE: NORMAL D
GESTATIONAL AGE > 9:: YES
LAB DIRECTOR NAME PROVIDER: NORMAL
LAB DIRECTOR NAME PROVIDER: NORMAL
LABORATORY COMMENT REPORT: NORMAL
LIMITATIONS OF THE TEST: NORMAL
N GONORRHOEA DNA SPEC QL NAA+PROBE: NEGATIVE
NEGATIVE PREDICTIVE VALUE: NORMAL
PERFORMANCE CHARACTERISTICS: NORMAL
POSITIVE PREDICTIVE VALUE: NORMAL
REF LAB TEST METHOD: NORMAL
SL AMB NOTE:: NORMAL
TEST PERFORMANCE INFO SPEC: NORMAL

## 2025-02-06 ENCOUNTER — RESULTS FOLLOW-UP (OUTPATIENT)
Dept: OTHER | Facility: HOSPITAL | Age: 40
End: 2025-02-06

## 2025-02-10 ENCOUNTER — RESULTS FOLLOW-UP (OUTPATIENT)
Dept: OBGYN CLINIC | Facility: CLINIC | Age: 40
End: 2025-02-10

## 2025-02-10 LAB
LAB AP GYN PRIMARY INTERPRETATION: NORMAL
LAB AP LMP: NORMAL
Lab: NORMAL

## 2025-02-11 ENCOUNTER — PATIENT OUTREACH (OUTPATIENT)
Age: 40
End: 2025-02-11

## 2025-02-11 NOTE — LETTER
February 11, 2025    Alexandra Espinosa  [address]  [place] [address]      Elton Morris,      I hope you are doing well. I am reaching out because I have made a few attempts to call you since our last conversation on 01/22/25. Unfortunately, I have not been able to reach you! I apologize if I have been calling at a bad time.       If you are still interested in social work services, I kindly request that you contact me at [phone] so that I can assist with your care needs. My normal hours are Monday through Friday, 8:00am to 4:30pm.      Take care,    Annamarie Alford, MSW, LSW

## 2025-02-11 NOTE — PROGRESS NOTES
Second call attempt to inquire about updates on patient's MA/SNAP/LIHEAP application, confirm she received Find Help resources, contacting [facility] billing [facility]pt, and calling [employer] for rental/utility assistance. No answer - SW left VM requesting a call back. Patient's next appt is scheduled for 2/21/25 with Dr. Bobo.      Due to lack of response from patient, UTR letter sent via [email or web address]. Note routed to Ob Navigator & last seen provider EDSON Woodward.

## 2025-02-14 DIAGNOSIS — Z3A.12 12 WEEKS GESTATION OF PREGNANCY: ICD-10-CM

## 2025-02-14 DIAGNOSIS — O99.211 OBESITY AFFECTING PREGNANCY IN FIRST TRIMESTER, UNSPECIFIED OBESITY TYPE: ICD-10-CM

## 2025-02-14 RX ORDER — ASPIRIN 81 MG/1
TABLET, COATED ORAL
Qty: 180 TABLET | Refills: 1 | Status: SHIPPED | OUTPATIENT
Start: 2025-02-14

## 2025-02-18 ENCOUNTER — ROUTINE PRENATAL (OUTPATIENT)
Dept: OBGYN CLINIC | Facility: CLINIC | Age: 40
End: 2025-02-18
Payer: COMMERCIAL

## 2025-02-18 ENCOUNTER — PATIENT OUTREACH (OUTPATIENT)
Age: 40
End: 2025-02-18

## 2025-02-18 VITALS — DIASTOLIC BLOOD PRESSURE: 70 MMHG | WEIGHT: 221.4 LBS | BODY MASS INDEX: 34.68 KG/M2 | SYSTOLIC BLOOD PRESSURE: 110 MMHG

## 2025-02-18 DIAGNOSIS — D50.8 OTHER IRON DEFICIENCY ANEMIA: ICD-10-CM

## 2025-02-18 DIAGNOSIS — Z3A.15 15 WEEKS GESTATION OF PREGNANCY: ICD-10-CM

## 2025-02-18 DIAGNOSIS — O99.210 OBESITY AFFECTING PREGNANCY, ANTEPARTUM, UNSPECIFIED OBESITY TYPE: ICD-10-CM

## 2025-02-18 DIAGNOSIS — O09.299 HISTORY OF POSTPARTUM HEMORRHAGE, CURRENTLY PREGNANT: ICD-10-CM

## 2025-02-18 DIAGNOSIS — Z86.32 HISTORY OF GESTATIONAL DIABETES IN PRIOR PREGNANCY, CURRENTLY PREGNANT: ICD-10-CM

## 2025-02-18 DIAGNOSIS — O09.299 HISTORY OF GESTATIONAL DIABETES IN PRIOR PREGNANCY, CURRENTLY PREGNANT: ICD-10-CM

## 2025-02-18 DIAGNOSIS — Z34.82 PRENATAL CARE, SUBSEQUENT PREGNANCY, SECOND TRIMESTER: Primary | ICD-10-CM

## 2025-02-18 PROCEDURE — PNV: Performed by: OBSTETRICS & GYNECOLOGY

## 2025-02-18 PROCEDURE — 81002 URINALYSIS NONAUTO W/O SCOPE: CPT | Performed by: OBSTETRICS & GYNECOLOGY

## 2025-02-18 NOTE — PROGRESS NOTES
15 weeks gestation of pregnancy  Labs completed and reviewed.   [facility] 20 week imaging has been scheduled. NIPT completed.   AFP ordered    Taking PNV. Believes sertraline is causing nausea. Discussed taking meds with food or alternate times of day.     Iron deficiency anemia  History of anemia, most current hgb is within normal limits. Discussed iron rich foods, non capsule type supplements to prevent onset of anemia.     Maternal obesity affecting pregnancy, antepartum  Plan for 20 week and third trimester imaging.   Weight gain should be kept to a minimum, <25lbs.  Current TWG 14lbs  Early 1 hour glucola wnl      History of postpartum hemorrhage, currently pregnant  PNC referred patient to hematology      History of gestational diabetes in prior pregnancy, currently pregnant  Early glucola wnl            Patient is here for prenatal ob visit today.  GA: 15w2d  SHAWN: 8/10/25    Denies LOF, VB, CTX  +FM    Urine: Protein neg / Glucose neg  Labs utd  -afp ordered today    Blue folder given at intake    Flu given 2/3    Vomiting with taking medications.     Very tired. Regardless of a good nights sleep.     Needs iron. Gummies? Pills are too harsh on stomach

## 2025-02-19 PROBLEM — Z3A.15 15 WEEKS GESTATION OF PREGNANCY: Status: ACTIVE | Noted: 2025-01-24

## 2025-02-19 LAB
SL AMB  POCT GLUCOSE, UA: NORMAL
SL AMB POCT URINE PROTEIN: NORMAL

## 2025-02-19 NOTE — ASSESSMENT & PLAN NOTE
Labs completed and reviewed.   PN 20 week imaging has been scheduled. NIPT completed.   AFP ordered    Taking PNV. Believes sertraline is causing nausea. Discussed taking meds with food or alternate times of day.

## 2025-02-19 NOTE — ASSESSMENT & PLAN NOTE
History of anemia, most current hgb is within normal limits. Discussed iron rich foods, non capsule type supplements to prevent onset of anemia.

## 2025-02-19 NOTE — ASSESSMENT & PLAN NOTE
Plan for 20 week and third trimester imaging.   Weight gain should be kept to a minimum, <25lbs.  Current TWG 14lbs  Early 1 hour glucola wnl

## 2025-02-20 ENCOUNTER — PATIENT OUTREACH (OUTPATIENT)
Dept: OBGYN CLINIC | Facility: CLINIC | Age: 40
End: 2025-02-20

## 2025-02-27 ENCOUNTER — TELEPHONE (OUTPATIENT)
Dept: OBGYN CLINIC | Facility: CLINIC | Age: 40
End: 2025-02-27

## 2025-02-27 NOTE — TELEPHONE ENCOUNTER
Patient was called and discussed the following:  Overall how are you doing? Feeling alright. Can't keep meds down. Made OB aware and was told to try with applesauce. Patient is able to eat and keep food down.    Compliant with routine OB care appointments? YES    Have you completed your 1st trimester labs? YES    If you had NIPS with MFM, do you have a order for MSAFP? AFP is Alpha-fetoprotein. This is a blood draw that screens for birth defects like spina bifida and other genetic disorders.   The AFP is already ordered. You can get this drawn at any [facility] lab. Patient stated she will get this done.   Can be completed 15w-22w6d, ideally 16w-18w    Have you seen MFM and do you have your detailed US scheduled? 3/28    Pregnancy Education-have you had a chance to review the classes offered and registered? This is her 3rd child.    Patient knows to contact me via portal.

## 2025-03-05 ENCOUNTER — OFFICE VISIT (OUTPATIENT)
Dept: FAMILY MEDICINE CLINIC | Facility: CLINIC | Age: 40
End: 2025-03-05
Payer: COMMERCIAL

## 2025-03-05 VITALS
OXYGEN SATURATION: 99 % | HEART RATE: 84 BPM | DIASTOLIC BLOOD PRESSURE: 60 MMHG | BODY MASS INDEX: 34.88 KG/M2 | WEIGHT: 222.2 LBS | SYSTOLIC BLOOD PRESSURE: 110 MMHG | HEIGHT: 67 IN

## 2025-03-05 DIAGNOSIS — Z00.00 ANNUAL PHYSICAL EXAM: Primary | ICD-10-CM

## 2025-03-05 DIAGNOSIS — F32.A ANXIETY AND DEPRESSION: ICD-10-CM

## 2025-03-05 DIAGNOSIS — F41.9 ANXIETY AND DEPRESSION: ICD-10-CM

## 2025-03-05 DIAGNOSIS — Z3A.17 17 WEEKS GESTATION OF PREGNANCY: ICD-10-CM

## 2025-03-05 PROCEDURE — 99395 PREV VISIT EST AGE 18-39: CPT | Performed by: NURSE PRACTITIONER

## 2025-03-05 NOTE — PROGRESS NOTES
Adult Annual Physical  Name: Alexandra Espinosa      : 1985      MRN: [id number]  Encounter Provider: KINJAL Hinojosa  Encounter Date: 3/5/2025   Encounter department: [facility] [address]    Assessment & Plan  Annual physical exam         Anxiety and depression  Patient stopped sertraline.  Felt medication was not effective.  Feels stable off medication right now.  Denies increased depression, SI/HI.  Wishes to hold off on medication management during pregnancy.  Advised to return for increase in symptoms related to depression or anxiety.         17 weeks gestation of pregnancy  To continue follow-up with MFM in OB/GYN as scheduled.         Immunizations and preventive care screenings were discussed with patient today. Appropriate education was printed on patient's after visit summary.    Counseling:  Alcohol/drug use: discussed moderation in alcohol intake, the recommendations for healthy alcohol use, and avoidance of illicit drug use.  Dental Health: discussed importance of regular tooth brushing, flossing, and dental visits.  Injury prevention: discussed safety/seat belts, safety helmets, smoke detectors, carbon monoxide detectors, and smoking near bedding or upholstery.  Sexual health: discussed sexually transmitted diseases, partner selection, use of condoms, avoidance of unintended pregnancy, and contraceptive alternatives.  Exercise: the importance of regular exercise/physical activity was discussed. Recommend exercise 3-5 times per week for at least 30 minutes.          History of Present Illness     Adult Annual Physical:  Patient presents for annual physical.     Diet and Physical Activity:  - Diet/Nutrition: well balanced diet.  - Exercise: no formal exercise.    General Health:  - Sleep: sleeps poorly and 4-6 hours of sleep on average.  - Hearing: normal hearing bilateral ears.  - Dental: brushes teeth twice daily and no dental visits for > 1  year.    /GYN Health:  - Follows with GYN: yes.   - Last menstrual cycle: 11/3/2024.   - History of STDs: no    Advanced Care Planning:  - Has an advanced directive?: no    - Has a durable medical POA?: no    - ACP document given to patient?: no      Review of Systems   Constitutional:  Negative for activity change, appetite change, fever and unexpected weight change.   HENT:  Negative for ear pain, sore throat and trouble swallowing.    Eyes:  Negative for photophobia and visual disturbance.   Respiratory:  Negative for chest tightness and shortness of breath.    Cardiovascular:  Negative for chest pain, palpitations and leg swelling.   Gastrointestinal:  Positive for nausea and vomiting. Negative for abdominal pain, blood in stool and constipation.   Genitourinary:  Negative for decreased urine volume, dysuria, frequency, hematuria, pelvic pain, urgency and vaginal bleeding.   Musculoskeletal:  Negative for arthralgias and myalgias.   Skin:  Negative for color change and rash.   Neurological:  Negative for dizziness, syncope, light-headedness, numbness and headaches.   Hematological:  Negative for adenopathy. Does not bruise/bleed easily.   Psychiatric/Behavioral:  Negative for behavioral problems and dysphoric mood. The patient is not nervous/anxious.      Pertinent Medical History           Medical History Reviewed by provider this encounter:  Tobacco  Allergies  Meds  Problems  Med Hx  Surg Hx  Fam Hx  Soc   Hx    .  Past Medical History   Past Medical History:   Diagnosis Date    Anemia     Anxiety     takes zoloft    Gestational diabetes 02/27/2023    Diet Controlled    Hemorrhage after vaginal delivery     2021 and 2023     Past Surgical History:   Procedure Laterality Date    WISDOM TOOTH EXTRACTION       Family History   Problem Relation Age of Onset    Hypertension Mother     No Known Problems Father     No Known Problems Sister     No Known Problems Sister     Stroke Maternal Grandfather      Heart attack Paternal Grandmother     Parkinsonism Paternal Grandfather     Nephrotic syndrome Son     No Known Problems Son       reports that she has never smoked. She has never been exposed to tobacco smoke. She has never used smokeless tobacco. She reports that she does not currently use alcohol. She reports that she does not use drugs.  Current Outpatient Medications   Medication Instructions    aspirin (Aspirin Low Dose) 81 mg EC tablet TAKE 2 TABLETS DAILY STOP AT 36 WEEKS. CONTACT YOUR OB OR MFM WITH ANY SIDE EFFECTS    Prenatal Vit-Fe Fumarate-FA (PRENATAL VITAMIN PO) 1 tablet, Daily    sertraline (ZOLOFT) 25 mg, Oral, Daily    vitamin B-12 (VITAMIN B-12) 500 mcg, Daily    Vitamin D 2,000 Units, Oral, Daily   No Known Allergies   Current Outpatient Medications on File Prior to Visit   Medication Sig Dispense Refill    Prenatal Vit-Fe Fumarate-FA (PRENATAL VITAMIN PO) Take 1 tablet by mouth in the morning      aspirin (Aspirin Low Dose) 81 mg EC tablet TAKE 2 TABLETS DAILY STOP AT 36 WEEKS. CONTACT YOUR OB OR MFM WITH ANY SIDE EFFECTS (Patient not taking: Reported on 3/5/2025) 180 tablet 1    Cholecalciferol (Vitamin D) 50 MCG (2000 UT) tablet Take 1 tablet (2,000 Units total) by mouth daily (Patient not taking: Reported on 3/5/2025) 90 tablet 1    sertraline (ZOLOFT) 25 mg tablet TAKE 1 TABLET (25 MG TOTAL) BY MOUTH DAILY. (Patient not taking: Reported on 3/5/2025) 90 tablet 1    vitamin B-12 (VITAMIN B-12) 500 mcg tablet Take 500 mcg by mouth daily (Patient not taking: Reported on 3/5/2025)       No current facility-administered medications on file prior to visit.      Social History     Tobacco Use    Smoking status: Never     Passive exposure: Never    Smokeless tobacco: Never   Vaping Use    Vaping status: Never Used   Substance and Sexual Activity    Alcohol use: Not Currently    Drug use: Never    Sexual activity: Yes     Partners: Male     Birth control/protection: Other, None     Comment: the pill  "nichole munroe       Objective   /60 (BP Location: Left arm, Patient Position: Sitting, Cuff Size: Large)   Pulse 84   Ht 5' 7\" (1.702 m)   Wt 101 kg (222 lb 3.2 oz)   LMP 11/03/2024 (Exact Date)   SpO2 99%   BMI 34.80 kg/m²     Physical Exam  Vitals reviewed.   Constitutional:       General: She is not in acute distress.     Appearance: Normal appearance. She is not ill-appearing.   HENT:      Head: Normocephalic and atraumatic.      Right Ear: Tympanic membrane, ear canal and external ear normal.      Left Ear: Tympanic membrane, ear canal and external ear normal.      Nose: Nose normal.      Mouth/Throat:      Mouth: Mucous membranes are moist.      Pharynx: Oropharynx is clear.   Eyes:      Conjunctiva/sclera: Conjunctivae normal.      Pupils: Pupils are equal, round, and reactive to light.   Neck:      Vascular: No carotid bruit.   Cardiovascular:      Rate and Rhythm: Normal rate and regular rhythm.      Pulses: Normal pulses.      Heart sounds: Normal heart sounds. No murmur heard.  Pulmonary:      Effort: Pulmonary effort is normal.      Breath sounds: Normal breath sounds.   Abdominal:      General: Bowel sounds are normal.      Palpations: Abdomen is soft.      Tenderness: There is no abdominal tenderness.   Musculoskeletal:         General: Normal range of motion.      Cervical back: Normal range of motion and neck supple.      Right lower leg: No edema.      Left lower leg: No edema.   Lymphadenopathy:      Cervical: No cervical adenopathy.   Skin:     General: Skin is warm and dry.   Neurological:      General: No focal deficit present.      Mental Status: She is alert and oriented to person, place, and time.   Psychiatric:         Mood and Affect: Mood normal.         Behavior: Behavior normal.         "

## 2025-03-05 NOTE — PATIENT INSTRUCTIONS
"Patient Education     Routine physical for adults   The Basics   Written by the doctors and editors at [facility]   What is a physical? -- A physical is a routine visit, or \"check-up,\" with your doctor. You might also hear it called a \"wellness visit\" or \"preventive visit.\"  During each visit, the doctor will:   Ask about your physical and mental health   Ask about your habits, behaviors, and lifestyle   Do an exam   Give you vaccines if needed   Talk to you about any medicines you take   Give advice about your health   Answer your questions  Getting regular check-ups is an important part of taking care of your health. It can help your doctor find and treat any problems you have. But it's also important for preventing health problems.  A routine physical is different from a \"sick visit.\" A sick visit is when you see a doctor because of a health concern or problem. Since physicals are scheduled ahead of time, you can think about what you want to ask the doctor.  How often should I get a physical? -- It depends on your age and health. In general, for people age 21 years and older:   If you are younger than 50 years, you might be able to get a physical every 3 years.   If you are 50 years or older, your doctor might recommend a physical every year.  If you have an ongoing health condition, like diabetes or high blood pressure, your doctor will probably want to see you more often.  What happens during a physical? -- In general, each visit will include:   Physical exam - The doctor or nurse will check your height, weight, heart rate, and blood pressure. They will also look at your eyes and ears. They will ask about how you are feeling and whether you have any symptoms that bother you.   Medicines - It's a good idea to bring a list of all the medicines you take to each doctor visit. Your doctor will talk to you about your medicines and answer any questions. Tell them if you are having any side effects that bother you. You " "should also tell them if you are having trouble paying for any of your medicines.   Habits and behaviors - This includes:   Your diet   Your exercise habits   Whether you smoke, drink alcohol, or use drugs   Whether you are sexually active   Whether you feel safe at home  Your doctor will talk to you about things you can do to improve your health and lower your risk of health problems. They will also offer help and support. For example, if you want to quit smoking, they can give you advice and might prescribe medicines. If you want to improve your diet or get more physical activity, they can help you with this, too.   Lab tests, if needed - The tests you get will depend on your age and situation. For example, your doctor might want to check your:   Cholesterol   Blood sugar   Iron level   Vaccines - The recommended vaccines will depend on your age, health, and what vaccines you already had. Vaccines are very important because they can prevent certain serious or deadly infections.   Discussion of screening - \"Screening\" means checking for diseases or other health problems before they cause symptoms. Your doctor can recommend screening based on your age, risk, and preferences. This might include tests to check for:   Cancer, such as breast, prostate, cervical, ovarian, colorectal, prostate, lung, or skin cancer   Sexually transmitted infections, such as chlamydia and gonorrhea   Mental health conditions like depression and anxiety  Your doctor will talk to you about the different types of screening tests. They can help you decide which screenings to have. They can also explain what the results might mean.   Answering questions - The physical is a good time to ask the doctor or nurse questions about your health. If needed, they can refer you to other doctors or specialists, too.  Adults older than 65 years often need other care, too. As you get older, your doctor will talk to you about:   How to prevent falling at " home   Hearing or vision tests   Memory testing   How to take your medicines safely   Making sure that you have the help and support you need at home  All topics are updated as new evidence becomes available and our peer review process is complete.  This topic retrieved from [employer] on: May 02, 2024.  Topic 374851 Version 1.0  Release: 32.4.3 - C32.122  © 2024 UpToDate, Inc. and/or its affiliates. All rights reserved.  Consumer Information Use and Disclaimer   Disclaimer: This generalized information is a limited summary of diagnosis, treatment, and/or medication information. It is not meant to be comprehensive and should be used as a tool to help the user understand and/or assess potential diagnostic and treatment options. It does NOT include all information about conditions, treatments, medications, side effects, or risks that may apply to a specific patient. It is not intended to be medical advice or a substitute for the medical advice, diagnosis, or treatment of a health care provider based on the health care provider's examination and assessment of a patient's specific and unique circumstances. Patients must speak with a health care provider for complete information about their health, medical questions, and treatment options, including any risks or benefits regarding use of medications. This information does not endorse any treatments or medications as safe, effective, or approved for treating a specific patient. UpToDate, Inc. and its affiliates disclaim any warranty or liability relating to this information or the use thereof.The use of this information is governed by the Terms of Use, available at https://www.woltersEuroCapital BITEXuwer.com/en/know/clinical-effectiveness-terms. 2024© UpToDate, Inc. and its affiliates and/or licensors. All rights reserved.  Copyright   © 2024 UpToDate, Inc. and/or its affiliates. All rights reserved.

## 2025-03-05 NOTE — ASSESSMENT & PLAN NOTE
Patient stopped sertraline.  Felt medication was not effective.  Feels stable off medication right now.  Denies increased depression, SI/HI.  Wishes to hold off on medication management during pregnancy.  Advised to return for increase in symptoms related to depression or anxiety.

## 2025-03-19 PROBLEM — Z3A.19 19 WEEKS GESTATION OF PREGNANCY: Status: ACTIVE | Noted: 2025-01-24

## 2025-03-21 ENCOUNTER — TELEPHONE (OUTPATIENT)
Age: 40
End: 2025-03-21

## 2025-03-21 NOTE — TELEPHONE ENCOUNTER
Working on an appointment for the week of 3/24 patient last ob appointment seen was 2/18 and she has to cancel on 3/19.

## 2025-03-21 NOTE — TELEPHONE ENCOUNTER
Pt called to R/S appt from 3/19/25 for her 19 week check up she missed unable to find pt an appt next available is end of April and pt's next appt is on 4/16/25. Pt states she is ok with not R/S as she has an appt with MFM on 3/28/25 and she is feeling fine. Asked to please call her back to let her know if it is ok to skip her appt and wait for the one sched on 4/16/25.

## 2025-03-24 PROBLEM — Z3A.20 20 WEEKS GESTATION OF PREGNANCY: Status: ACTIVE | Noted: 2025-01-24

## 2025-03-26 ENCOUNTER — APPOINTMENT (OUTPATIENT)
Dept: LAB | Facility: CLINIC | Age: 40
End: 2025-03-26
Payer: COMMERCIAL

## 2025-03-26 DIAGNOSIS — Z34.82 PRENATAL CARE, SUBSEQUENT PREGNANCY, SECOND TRIMESTER: ICD-10-CM

## 2025-03-26 PROCEDURE — 82105 ALPHA-FETOPROTEIN SERUM: CPT

## 2025-03-26 PROCEDURE — 36415 COLL VENOUS BLD VENIPUNCTURE: CPT

## 2025-03-28 ENCOUNTER — ROUTINE PRENATAL (OUTPATIENT)
Dept: PERINATAL CARE | Facility: OTHER | Age: 40
End: 2025-03-28
Payer: COMMERCIAL

## 2025-03-28 VITALS
WEIGHT: 222.4 LBS | SYSTOLIC BLOOD PRESSURE: 108 MMHG | DIASTOLIC BLOOD PRESSURE: 66 MMHG | BODY MASS INDEX: 34.91 KG/M2 | HEIGHT: 67 IN | HEART RATE: 86 BPM

## 2025-03-28 DIAGNOSIS — Z36.3 ENCOUNTER FOR ANTENATAL SCREENING FOR MALFORMATION USING ULTRASOUND: ICD-10-CM

## 2025-03-28 DIAGNOSIS — Z3A.20 20 WEEKS GESTATION OF PREGNANCY: Primary | ICD-10-CM

## 2025-03-28 DIAGNOSIS — Z86.32 HISTORY OF GESTATIONAL DIABETES IN PRIOR PREGNANCY, CURRENTLY PREGNANT: ICD-10-CM

## 2025-03-28 DIAGNOSIS — Z36.86 ENCOUNTER FOR ANTENATAL SCREENING FOR CERVICAL LENGTH: ICD-10-CM

## 2025-03-28 DIAGNOSIS — O99.210 OBESITY AFFECTING PREGNANCY, ANTEPARTUM, UNSPECIFIED OBESITY TYPE: ICD-10-CM

## 2025-03-28 DIAGNOSIS — O09.299 HISTORY OF POSTPARTUM HEMORRHAGE, CURRENTLY PREGNANT: ICD-10-CM

## 2025-03-28 DIAGNOSIS — O09.299 HISTORY OF GESTATIONAL DIABETES IN PRIOR PREGNANCY, CURRENTLY PREGNANT: ICD-10-CM

## 2025-03-28 DIAGNOSIS — O09.521 AMA (ADVANCED MATERNAL AGE) MULTIGRAVIDA 35+, FIRST TRIMESTER: ICD-10-CM

## 2025-03-28 PROCEDURE — 76817 TRANSVAGINAL US OBSTETRIC: CPT | Performed by: OBSTETRICS & GYNECOLOGY

## 2025-03-28 PROCEDURE — 76811 OB US DETAILED SNGL FETUS: CPT | Performed by: OBSTETRICS & GYNECOLOGY

## 2025-03-28 PROCEDURE — 99213 OFFICE O/P EST LOW 20 MIN: CPT | Performed by: OBSTETRICS & GYNECOLOGY

## 2025-03-28 NOTE — PROGRESS NOTES
"[facility]: Alexandra was seen today for anatomic survey and cervical length screening ultrasound.  See ultrasound report under \"OB Procedures\" tab.       -----------------------------------------    The time spent on this established patient on the encounter date included 5 minutes previsit service time reviewing records and precharting, 5 minutes face-to-face service time counseling regarding results and coordinating care, and  4 minutes charting, totalling 14 minutes.  Please don't hesitate to contact our office with any concerns or questions.  -Charo Morales MD    "

## 2025-03-28 NOTE — PROGRESS NOTES
Ultrasound Probe Disinfection    A transvaginal ultrasound was performed.   Prior to use, disinfection was performed with High Level Disinfection Process ([place]).  Probe serial number [id number]: [id number]   was used.    Genoveva Rick  03/28/25  12:57 PM

## 2025-03-29 LAB
2ND TRIMESTER 4 SCREEN SERPL-IMP: NORMAL
AFP ADJ MOM SERPL: 1.46
AFP INTERP AMN-IMP: NORMAL
AFP INTERP SERPL-IMP: NORMAL
AFP INTERP SERPL-IMP: NORMAL
AFP SERPL-MCNC: 55.3 NG/ML
AGE AT DELIVERY: 38.4 YR
GA METHOD: NORMAL
GA: 20.4 WEEKS
IDDM PATIENT QL: YES
MULTIPLE PREGNANCY: NO
NEURAL TUBE DEFECT RISK FETUS: 910 %

## 2025-04-03 PROBLEM — Z3A.22 22 WEEKS GESTATION OF PREGNANCY: Status: ACTIVE | Noted: 2025-01-24

## 2025-04-03 PROBLEM — R23.4 BREAST SKIN CHANGES: Status: RESOLVED | Noted: 2021-03-19 | Resolved: 2025-04-03

## 2025-04-10 DIAGNOSIS — F32.A ANXIETY AND DEPRESSION: ICD-10-CM

## 2025-04-10 DIAGNOSIS — F41.9 ANXIETY AND DEPRESSION: ICD-10-CM

## 2025-04-10 RX ORDER — SERTRALINE HYDROCHLORIDE 25 MG/1
25 TABLET, FILM COATED ORAL DAILY
Qty: 90 TABLET | Refills: 1 | OUTPATIENT
Start: 2025-04-10

## 2025-04-15 ENCOUNTER — TELEPHONE (OUTPATIENT)
Age: 40
End: 2025-04-15

## 2025-04-15 NOTE — TELEPHONE ENCOUNTER
Patient called to r/s all of her OB appts due to a change in her work schedule. I was able to r/s most of patients appts but there was no available appts for a r/s of 4/16/2025. Please call patient back to schedule OB visit.    Warm transferred to office spoke with Rebecca since when rescheduling appts, no available delivering OB providers in [facility] or [facility] (for patients preferred time 4:30pm ). Patient did prefer to stay in [place] and was okay with just seeing Dr Denny Saha. Please call patient if she can be schedule with Dr Denny Saha for some OB appts.

## 2025-04-16 ENCOUNTER — TELEPHONE (OUTPATIENT)
Age: 40
End: 2025-04-16

## 2025-04-16 NOTE — TELEPHONE ENCOUNTER
Patient called office back to ask if Robitussin 8 hour relief long acting cough gels are safe to take in pregnancy. Will reach out to provider for recommendation. Please see attached picture.

## 2025-04-16 NOTE — TELEPHONE ENCOUNTER
Spoke with patient, offered her an appointment for 4/18 @ 2:30 in [place], patient declined stating she needs an appointment after 3:15. No availability at the moment, informed patient she will be contacted when there is an opening.

## 2025-04-16 NOTE — TELEPHONE ENCOUNTER
Incoming call from patient, requesting pregnancy safe med list as she has cough/cold symptoms.     ClearApp message sent to patient with medication list.

## 2025-04-16 NOTE — TELEPHONE ENCOUNTER
Patient checking in on request to reschedule appointments. Patient unable to make it to appointment today, 4/16. There are no available appointments to move patient to. Routing to clerical team for review.

## 2025-04-17 ENCOUNTER — OFFICE VISIT (OUTPATIENT)
Dept: URGENT CARE | Facility: CLINIC | Age: 40
End: 2025-04-17
Payer: COMMERCIAL

## 2025-04-17 VITALS
HEART RATE: 95 BPM | RESPIRATION RATE: 19 BRPM | WEIGHT: 244.8 LBS | BODY MASS INDEX: 38.34 KG/M2 | SYSTOLIC BLOOD PRESSURE: 124 MMHG | OXYGEN SATURATION: 98 % | TEMPERATURE: 98.6 F | DIASTOLIC BLOOD PRESSURE: 60 MMHG

## 2025-04-17 DIAGNOSIS — J01.00 ACUTE MAXILLARY SINUSITIS, RECURRENCE NOT SPECIFIED: Primary | ICD-10-CM

## 2025-04-17 PROCEDURE — G0382 LEV 3 HOSP TYPE B ED VISIT: HCPCS | Performed by: PHYSICIAN ASSISTANT

## 2025-04-17 RX ORDER — AMOXICILLIN 500 MG/1
500 CAPSULE ORAL EVERY 8 HOURS SCHEDULED
Qty: 21 CAPSULE | Refills: 0 | Status: SHIPPED | OUTPATIENT
Start: 2025-04-17 | End: 2025-04-24

## 2025-04-17 NOTE — PROGRESS NOTES
[facility]        NAME: Alexandra Espinosa is a 40 y.o. female  : 1985    MRN: [id number]  DATE: 2025  TIME: 2:42 PM    Assessment and Plan   Acute maxillary sinusitis, recurrence not specified [J01.00]  1. Acute maxillary sinusitis, recurrence not specified  amoxicillin (AMOXIL) 500 mg capsule            Patient Instructions     Patient Instructions   70 treatment with amoxicillin for sinus infection.    Patient advised to take over-the-counter plain Mucinex as it is safe in pregnancy and can help with decongestion. Take over the counter medications as needed for symptomatic management.     Follow up with PCP in 3-5 days.  Proceed to  ER if symptoms worsen.    If tests are performed, our office will contact you with results only if changes need to made to the care plan discussed with you at the visit. [facility]        Chief Complaint     Chief Complaint   Patient presents with    Cold Like Symptoms     Pt c/o sinus congestion, mouth of fire and dry, phlegm and face hurts to the touch. Took Robitussin and tylenol - no help. Started 1 week ago.          History of Present Illness       Sinusitis  This is a new problem. The current episode started in the past 7 days. The problem is unchanged. There has been no fever. The pain is moderate. Associated symptoms include congestion, headaches and sinus pressure. Pertinent negatives include no ear pain or shortness of breath. (Dry, burning mouth) Past treatments include acetaminophen.       Review of Systems   Review of Systems   HENT:  Positive for congestion and sinus pressure. Negative for ear pain.    Respiratory:  Negative for shortness of breath.    Neurological:  Positive for headaches.   All other systems reviewed and are negative.        Current Medications       Current Outpatient Medications:     amoxicillin (AMOXIL) 500 mg capsule, Take 1 capsule (500 mg total) by mouth every 8 (eight)  hours for 7 days, Disp: 21 capsule, Rfl: 0    Prenatal Vit-Fe Fumarate-FA (PRENATAL VITAMIN PO), Take 1 tablet by mouth in the morning, Disp: , Rfl:     vitamin B-12 (VITAMIN B-12) 500 mcg tablet, Take 500 mcg by mouth daily, Disp: , Rfl:     aspirin (Aspirin Low Dose) 81 mg EC tablet, TAKE 2 TABLETS DAILY STOP AT 36 WEEKS. CONTACT YOUR OB OR MFM WITH ANY SIDE EFFECTS (Patient not taking: Reported on 4/17/2025), Disp: 180 tablet, Rfl: 1    Cholecalciferol (Vitamin D) 50 MCG (2000 UT) tablet, Take 1 tablet (2,000 Units total) by mouth daily (Patient not taking: Reported on 3/5/2025), Disp: 90 tablet, Rfl: 1    sertraline (ZOLOFT) 25 mg tablet, TAKE 1 TABLET (25 MG TOTAL) BY MOUTH DAILY. (Patient not taking: Reported on 3/5/2025), Disp: 90 tablet, Rfl: 1    Current Allergies     Allergies as of 04/17/2025    (No Known Allergies)            The following portions of the patient's history were reviewed and updated as appropriate: allergies, current medications, past family history, past medical history, past social history, past surgical history and problem list.     Past Medical History:   Diagnosis Date    Anemia     Anxiety     takes zoloft    Gestational diabetes 02/27/2023    Diet Controlled    Hemorrhage after vaginal delivery     2021 and 2023       Past Surgical History:   Procedure Laterality Date    WISDOM TOOTH EXTRACTION         Family History   Problem Relation Age of Onset    Hypertension Mother     No Known Problems Father     No Known Problems Sister     No Known Problems Sister     Stroke Maternal Grandfather     Heart attack Paternal Grandmother     Parkinsonism Paternal Grandfather     Nephrotic syndrome Son     No Known Problems Son          Medications have been verified.        Objective   /60   Pulse 95   Temp 98.6 °F (37 °C)   Resp 19   Wt 111 kg (244 lb 12.8 oz)   LMP 11/03/2024 (Exact Date)   SpO2 98%   BMI 38.34 kg/m²        Physical Exam     Physical Exam  Vitals and nursing note  reviewed.   Constitutional:       Appearance: Normal appearance.   HENT:      Right Ear: Tympanic membrane, ear canal and external ear normal.      Left Ear: Tympanic membrane, ear canal and external ear normal.      Nose: Congestion present.      Right Sinus: Maxillary sinus tenderness and frontal sinus tenderness present.      Left Sinus: Maxillary sinus tenderness and frontal sinus tenderness present.      Mouth/Throat:      Mouth: Mucous membranes are moist.   Cardiovascular:      Rate and Rhythm: Normal rate and regular rhythm.   Pulmonary:      Effort: Pulmonary effort is normal.      Breath sounds: Normal breath sounds.   Skin:     General: Skin is warm and dry.   Neurological:      General: No focal deficit present.      Mental Status: She is alert and oriented to person, place, and time.   Psychiatric:         Mood and Affect: Mood normal.         Behavior: Behavior normal.

## 2025-04-17 NOTE — PATIENT INSTRUCTIONS
70 treatment with amoxicillin for sinus infection.    Patient advised to take over-the-counter plain Mucinex as it is safe in pregnancy and can help with decongestion. Take over the counter medications as needed for symptomatic management.     Follow up with PCP in 3-5 days.  Proceed to  ER if symptoms worsen.    If tests are performed, our office will contact you with results only if changes need to made to the care plan discussed with you at the visit. You can review your full results on [facility].

## 2025-04-29 ENCOUNTER — TELEPHONE (OUTPATIENT)
Dept: HEMATOLOGY ONCOLOGY | Facility: CLINIC | Age: 40
End: 2025-04-29

## 2025-04-29 ENCOUNTER — TELEPHONE (OUTPATIENT)
Facility: HOSPITAL | Age: 40
End: 2025-04-29

## 2025-04-29 NOTE — TELEPHONE ENCOUNTER
Spoke to PT regarding time change of 7/3 appt. PT stated to keep 2 pm time for now, and she would call back if unavailable.    None known

## 2025-05-05 PROBLEM — O09.523 AMA (ADVANCED MATERNAL AGE) MULTIGRAVIDA 35+, THIRD TRIMESTER: Status: ACTIVE | Noted: 2025-01-31

## 2025-05-05 PROBLEM — Z3A.26 26 WEEKS GESTATION OF PREGNANCY: Status: ACTIVE | Noted: 2025-01-24

## 2025-05-05 NOTE — ASSESSMENT & PLAN NOTE
Daily aspirin-couldn't tolerate it  AMA  1 hour GTT normal  Preeclampsia labs baseline done  History of GDM  Level 2 months 3/28  Fetal growth at 34 weeks on 7/3  28-week labs ordered  IOL to be scheduled for precipitous labors    Orders:    POCT urine dip

## 2025-05-05 NOTE — PROGRESS NOTES
Name: Alexandra Espinosa      : 1985      MRN: [id number]  Encounter Provider: KINJAL Bailon  Encounter Date: 2025   Encounter department: [facility] [place]  :  Assessment & Plan  Prenatal care, subsequent pregnancy, second trimester  Feels well.   Denies LOF/CTX/VB.   Discussed fetal awareness.   No concerns.   Overview charting updated today.    Orders:    RPR-Syphilis Screening (Total Syphilis IGG/IGM)    Glucose, 1H PG; Future    Anemia Panel w/Reflex, OB; Future    CBC and differential; Future    26 weeks gestation of pregnancy  Daily aspirin-couldn't tolerate it  AMA  1 hour GTT normal  Preeclampsia labs baseline done  History of GDM  Level 2 months 3/28  Fetal growth at 34 weeks on 7/3  28-week labs ordered  IOL to be scheduled for precipitous labors    Orders:    POCT urine dip    AMA (advanced maternal age) multigravida 35+, third trimester  Follow-up with [facility]       Obesity affecting pregnancy, antepartum, unspecified obesity type  TWG 6.35 kg (14 lb)         History of postpartum hemorrhage, currently pregnant  Heme consult on  canceled, patient rescheduled the appointment to        History of gestational diabetes in prior pregnancy, currently pregnant  28 week labs ordered       Varicose veins during pregnancy  Will obtain compression stockings, elevate legs. Vascular referral if no improvement

## 2025-05-05 NOTE — PATIENT INSTRUCTIONS
Patient Education     Pregnancy - The Sixth Month   About this topic   It is important for you to learn how to take care of yourself to help you have a healthy baby and safe delivery. It is good to have health care throughout your pregnancy.  The sixth month of your pregnancy starts around week 24 and lasts through week 28. By knowing how far along you are, you can learn what is normal for this stage of your pregnancy and plan for what is next.  General   Your body   During the sixth month of your pregnancy, here are some things you can expect.  You may:  Start to gain a little more weight. It is normal to gain about 10 to 15 pounds (4.5 to 7 kg) total in your first 6 months.  Have problems like back pain, dry eyes, or aching hands and wrists  Itching of palms, abdomen, or soles of your feet  Swelling of ankles, fingers, or face.  Need to urinate more frequently.  Have problems with hemorrhoids. Be sure to eat plenty of fresh fruits and vegetables to increase the fiber in your diet. Drink plenty of water to help keep your stools soft.  Continue having [place] contractions. You may feel your belly squeezing or getting tight.  Have a glucose test to test for gestational diabetes.  Have more headaches. Talk to your doctor about how to help with them.  See stretch marks on your belly, breasts, or legs. Stay active to try and keep good muscle tone.  See an increase in vaginal discharge. Talk to your doctor about what to expect.  Your baby's growth and development:  Your baby looks like a very small  baby.  They are able to live outside of your womb but would need a lot of help breathing, eating, and staying warm in an intensive care unit.  Your baby has times of being awake and times of being asleep. The baby’s eyelids are able to open and close.  They move more and have hiccups.  Your baby is about 14 inches (36 cm) long and weighs about 2 pounds (900 gm). Your baby is about the size of an eggplant.  Baby  may be able to hear voices.  Things to Think About   Avoid alcohol, drugs, tobacco products, and second hand smoke  Eat small meals throughout the day instead of larger meals.  Avoid spicy, greasy, or fatty foods.  Avoid lying down or bending over for around 3 hours after eating  Warm baths are fine to help you relax. Avoid hot tubs while you are pregnant.  Avoid straining when having bowel movements.  Learning how to care for your baby. You may want to sign up for classes on how to take care of a .  Are you planning on going back to work after having your baby? It’s not too early to think about .  Consider starting your birth plan if you have specific needs or wishes for delivery.  When do I need to call the doctor?   Contractions every 10 minutes or more often that do not go away with drinking water or position changes  Low, dull back pain that does not go away  Pressure in your pelvis that feels like your baby is pushing down  A gush or constant trickle of watery or bloody fluid leaking from your vagina  Cramps in your lower belly that come and go or are constant  Change in your baby's movement  Fever of 100.4°F (38°C) or higher  Little to no movement felt by baby in 2 hours. Your baby should be moving at least 10 times every 2 hours.  Headache that does not go away; blurry vision; seeing spots or halos; increase in swelling in your hands, feet, or face; and pain under your ribs on the right side  Vaginal bleeding with or without pain  After a car accident, fall, or any trauma to your belly  Having thoughts of harming yourself or others, or do not feel safe at home  Last Reviewed Date   2020  Consumer Information Use and Disclaimer   This generalized information is a limited summary of diagnosis, treatment, and/or medication information. It is not meant to be comprehensive and should be used as a tool to help the user understand and/or assess potential diagnostic and treatment options. It does  NOT include all information about conditions, treatments, medications, side effects, or risks that may apply to a specific patient. It is not intended to be medical advice or a substitute for the medical advice, diagnosis, or treatment of a health care provider based on the health care provider's examination and assessment of a patient’s specific and unique circumstances. Patients must speak with a health care provider for complete information about their health, medical questions, and treatment options, including any risks or benefits regarding use of medications. This information does not endorse any treatments or medications as safe, effective, or approved for treating a specific patient. UpToDate, Inc. and its affiliates disclaim any warranty or liability relating to this information or the use thereof. The use of this information is governed by the Terms of Use, available at https://www.woltersUpstream Technologiesuwer.com/en/know/clinical-effectiveness-terms   Copyright   Copyright © 2024 UpToDate, Inc. and its affiliates and/or licensors. All rights reserved.

## 2025-05-06 ENCOUNTER — ROUTINE PRENATAL (OUTPATIENT)
Age: 40
End: 2025-05-06
Payer: COMMERCIAL

## 2025-05-06 VITALS — DIASTOLIC BLOOD PRESSURE: 66 MMHG | SYSTOLIC BLOOD PRESSURE: 126 MMHG | BODY MASS INDEX: 34.61 KG/M2 | WEIGHT: 221 LBS

## 2025-05-06 DIAGNOSIS — Z3A.26 26 WEEKS GESTATION OF PREGNANCY: ICD-10-CM

## 2025-05-06 DIAGNOSIS — O09.299 HISTORY OF POSTPARTUM HEMORRHAGE, CURRENTLY PREGNANT: ICD-10-CM

## 2025-05-06 DIAGNOSIS — O22.00 VARICOSE VEINS DURING PREGNANCY: ICD-10-CM

## 2025-05-06 DIAGNOSIS — O09.523 AMA (ADVANCED MATERNAL AGE) MULTIGRAVIDA 35+, THIRD TRIMESTER: ICD-10-CM

## 2025-05-06 DIAGNOSIS — O99.210 OBESITY AFFECTING PREGNANCY, ANTEPARTUM, UNSPECIFIED OBESITY TYPE: ICD-10-CM

## 2025-05-06 DIAGNOSIS — O09.299 HISTORY OF GESTATIONAL DIABETES IN PRIOR PREGNANCY, CURRENTLY PREGNANT: ICD-10-CM

## 2025-05-06 DIAGNOSIS — Z86.32 HISTORY OF GESTATIONAL DIABETES IN PRIOR PREGNANCY, CURRENTLY PREGNANT: ICD-10-CM

## 2025-05-06 DIAGNOSIS — Z34.82 PRENATAL CARE, SUBSEQUENT PREGNANCY, SECOND TRIMESTER: Primary | ICD-10-CM

## 2025-05-06 LAB
SL AMB  POCT GLUCOSE, UA: ABNORMAL
SL AMB POCT URINE PROTEIN: 30

## 2025-05-06 PROCEDURE — 81002 URINALYSIS NONAUTO W/O SCOPE: CPT | Performed by: NURSE PRACTITIONER

## 2025-05-06 PROCEDURE — PNV: Performed by: NURSE PRACTITIONER

## 2025-05-08 ENCOUNTER — TELEPHONE (OUTPATIENT)
Age: 40
End: 2025-05-08

## 2025-05-08 NOTE — TELEPHONE ENCOUNTER
Patient called, is applying for secondary insurance through [place] Medicaid, inquired if [employer] or [employer] is in network. Pt could not specify plan names, advised [facility] is participating. [employer] [facility] [employer] is not-participating.   Encouraged pt to clarify plan name & will be able to better confirm, pt agreeable.

## 2025-05-22 ENCOUNTER — CONSULT (OUTPATIENT)
Dept: HEMATOLOGY ONCOLOGY | Facility: CLINIC | Age: 40
End: 2025-05-22
Attending: OBSTETRICS & GYNECOLOGY
Payer: COMMERCIAL

## 2025-05-22 ENCOUNTER — APPOINTMENT (OUTPATIENT)
Dept: LAB | Facility: CLINIC | Age: 40
End: 2025-05-22
Payer: COMMERCIAL

## 2025-05-22 VITALS
SYSTOLIC BLOOD PRESSURE: 116 MMHG | OXYGEN SATURATION: 98 % | WEIGHT: 225 LBS | BODY MASS INDEX: 35.31 KG/M2 | HEART RATE: 85 BPM | RESPIRATION RATE: 18 BRPM | HEIGHT: 67 IN | TEMPERATURE: 98 F | DIASTOLIC BLOOD PRESSURE: 70 MMHG

## 2025-05-22 DIAGNOSIS — O09.299 HISTORY OF POSTPARTUM HEMORRHAGE, CURRENTLY PREGNANT: Primary | ICD-10-CM

## 2025-05-22 DIAGNOSIS — E53.8 VITAMIN B 12 DEFICIENCY: ICD-10-CM

## 2025-05-22 DIAGNOSIS — O09.299 HISTORY OF POSTPARTUM HEMORRHAGE, CURRENTLY PREGNANT: ICD-10-CM

## 2025-05-22 DIAGNOSIS — D50.8 OTHER IRON DEFICIENCY ANEMIA: ICD-10-CM

## 2025-05-22 DIAGNOSIS — Z34.82 PRENATAL CARE, SUBSEQUENT PREGNANCY, SECOND TRIMESTER: ICD-10-CM

## 2025-05-22 LAB
ALBUMIN SERPL BCG-MCNC: 3.7 G/DL (ref 3.5–5)
ALP SERPL-CCNC: 63 U/L (ref 34–104)
ALT SERPL W P-5'-P-CCNC: 11 U/L (ref 7–52)
ANION GAP SERPL CALCULATED.3IONS-SCNC: 13 MMOL/L (ref 4–13)
APTT PPP: 22 SECONDS (ref 23–34)
AST SERPL W P-5'-P-CCNC: 13 U/L (ref 13–39)
BASOPHILS # BLD AUTO: 0.02 THOUSANDS/ÂΜL (ref 0–0.1)
BASOPHILS NFR BLD AUTO: 0 % (ref 0–1)
BILIRUB SERPL-MCNC: 1.44 MG/DL (ref 0.2–1)
BUN SERPL-MCNC: 7 MG/DL (ref 5–25)
CALCIUM SERPL-MCNC: 9.6 MG/DL (ref 8.4–10.2)
CHLORIDE SERPL-SCNC: 100 MMOL/L (ref 96–108)
CO2 SERPL-SCNC: 22 MMOL/L (ref 21–32)
CREAT SERPL-MCNC: 0.53 MG/DL (ref 0.6–1.3)
EOSINOPHIL # BLD AUTO: 0.05 THOUSAND/ÂΜL (ref 0–0.61)
EOSINOPHIL NFR BLD AUTO: 1 % (ref 0–6)
ERYTHROCYTE [DISTWIDTH] IN BLOOD BY AUTOMATED COUNT: 12.7 % (ref 11.6–15.1)
FERRITIN SERPL-MCNC: 4 NG/ML (ref 30–307)
FIBRINOGEN PPP-MCNC: 558 MG/DL (ref 206–523)
FOLATE SERPL-MCNC: 15.3 NG/ML
GFR SERPL CREATININE-BSD FRML MDRD: 120 ML/MIN/1.73SQ M
GLUCOSE 1H P 50 G GLC PO SERPL-MCNC: 126 MG/DL (ref 70–134)
GLUCOSE P FAST SERPL-MCNC: 122 MG/DL (ref 65–99)
HCT VFR BLD AUTO: 33.9 % (ref 34.8–46.1)
HGB BLD-MCNC: 10.7 G/DL (ref 11.5–15.4)
IMM GRANULOCYTES # BLD AUTO: 0.05 THOUSAND/UL (ref 0–0.2)
IMM GRANULOCYTES NFR BLD AUTO: 1 % (ref 0–2)
INR PPP: 0.93 (ref 0.85–1.19)
IRON SATN MFR SERPL: 6 % (ref 15–50)
IRON SERPL-MCNC: 41 UG/DL (ref 50–212)
LYMPHOCYTES # BLD AUTO: 1.66 THOUSANDS/ÂΜL (ref 0.6–4.47)
LYMPHOCYTES NFR BLD AUTO: 17 % (ref 14–44)
MCH RBC QN AUTO: 28.5 PG (ref 26.8–34.3)
MCHC RBC AUTO-ENTMCNC: 31.6 G/DL (ref 31.4–37.4)
MCV RBC AUTO: 90 FL (ref 82–98)
MONOCYTES # BLD AUTO: 0.52 THOUSAND/ÂΜL (ref 0.17–1.22)
MONOCYTES NFR BLD AUTO: 6 % (ref 4–12)
NEUTROPHILS # BLD AUTO: 7.22 THOUSANDS/ÂΜL (ref 1.85–7.62)
NEUTS SEG NFR BLD AUTO: 75 % (ref 43–75)
NRBC BLD AUTO-RTO: 0 /100 WBCS
PLATELET # BLD AUTO: 391 THOUSANDS/UL (ref 149–390)
PMV BLD AUTO: 10.4 FL (ref 8.9–12.7)
POTASSIUM SERPL-SCNC: 3.3 MMOL/L (ref 3.5–5.3)
PROT SERPL-MCNC: 7.4 G/DL (ref 6.4–8.4)
PROTHROMBIN TIME: 12.8 SECONDS (ref 12.3–15)
RBC # BLD AUTO: 3.76 MILLION/UL (ref 3.81–5.12)
SODIUM SERPL-SCNC: 135 MMOL/L (ref 135–147)
THROMBIN TIME: 16 SECONDS (ref 14.7–18.4)
TIBC SERPL-MCNC: 701.4 UG/DL (ref 250–450)
TRANSFERRIN SERPL-MCNC: 501 MG/DL (ref 203–362)
UIBC SERPL-MCNC: 660 UG/DL (ref 155–355)
VIT B12 SERPL-MCNC: 96 PG/ML (ref 180–914)
WBC # BLD AUTO: 9.52 THOUSAND/UL (ref 4.31–10.16)

## 2025-05-22 PROCEDURE — 85246 CLOT FACTOR VIII VW ANTIGEN: CPT

## 2025-05-22 PROCEDURE — 85670 THROMBIN TIME PLASMA: CPT

## 2025-05-22 PROCEDURE — 85384 FIBRINOGEN ACTIVITY: CPT

## 2025-05-22 PROCEDURE — 85240 CLOT FACTOR VIII AHG 1 STAGE: CPT

## 2025-05-22 PROCEDURE — 36415 COLL VENOUS BLD VENIPUNCTURE: CPT

## 2025-05-22 PROCEDURE — 85025 COMPLETE CBC W/AUTO DIFF WBC: CPT

## 2025-05-22 PROCEDURE — 82950 GLUCOSE TEST: CPT

## 2025-05-22 PROCEDURE — 82746 ASSAY OF FOLIC ACID SERUM: CPT

## 2025-05-22 PROCEDURE — 85610 PROTHROMBIN TIME: CPT

## 2025-05-22 PROCEDURE — 83918 ORGANIC ACIDS TOTAL QUANT: CPT

## 2025-05-22 PROCEDURE — 82728 ASSAY OF FERRITIN: CPT

## 2025-05-22 PROCEDURE — 99205 OFFICE O/P NEW HI 60 MIN: CPT | Performed by: INTERNAL MEDICINE

## 2025-05-22 PROCEDURE — 80053 COMPREHEN METABOLIC PANEL: CPT

## 2025-05-22 PROCEDURE — 83540 ASSAY OF IRON: CPT

## 2025-05-22 PROCEDURE — 85247 CLOT FACTOR VIII MULTIMETRIC: CPT

## 2025-05-22 PROCEDURE — 82607 VITAMIN B-12: CPT

## 2025-05-22 PROCEDURE — 85730 THROMBOPLASTIN TIME PARTIAL: CPT

## 2025-05-22 PROCEDURE — 85245 CLOT FACTOR VIII VW RISTOCTN: CPT

## 2025-05-22 PROCEDURE — 83550 IRON BINDING TEST: CPT

## 2025-05-22 NOTE — PROGRESS NOTES
Name: Alexandra Espinosa      : 1985      MRN: [id number]  Encounter Provider: Maria Luz Gong  Encounter Date: 2025   Encounter department: [facility] [place]  :  Assessment & Plan  History of postpartum hemorrhage, currently pregnant   I explained to the patient that most common causes of postpartum hemorrhage are uterine atony followed by trauma followed by retained placenta and in around 3% of cases of primary hematological disorder like a coagulopathy.  In her case on review of medical records from her prior pregnancies it seems she has had uterine atony.  However given history of heavy menstrual cycles and gum bleeding we will look for any primary hematological disorder like a coagulopathy underlying a factor deficiency or von Willebrand disorder which is one of the more common bleeding disorders contributing to the postpartum hemorrhage.  In this aspect will send for a comprehensive panel at this time including platelet function studies PT PTT von Willebrand profile fibrinogen thrombin time.  I did  her that during pregnancy the levels of some of the coagulation parameters may be affected and sometimes may be falsely elevated as well hence test results may not be completely accurate.  Will also send for liver function test to look for any pregnancy related abnormalities as they can also contribute to hemorrhage.  She has received oxytocin and ergot derivatives during her prior pregnancies as per OB/GYN.  Will have her follow-up to discuss this findings of the blood work if any primary hematological disorder is discerned then management will be planned accordingly.  If not then management will be per OB/GYN for uterine atony.  Tranexamic acid or can also be considered as she has no history of any thromboembolic disorders in the past.  A hematological consultation can also be considered as inpatient.  Active monitoring of coagulation factors especially  fibrinogen PT PTT and viscoelastic testing is recommended to look for any consumptive coagulopathy if postpartum hemorrhage recurs so as to guide factor replacement accordingly.  Orders:    Protime-INR; Future    APTT; Future    Von Willebrand Comprehensive Panel; Future    Platelet Aggregation Test; Future    CBC and differential; Future    Comprehensive metabolic panel; Future    Thrombin time; Future    Fibrinogen; Future    Other iron deficiency anemia  We will check and if deficiency noted recommend replacement before her delivery.  She is on oral supplements.  Orders:    Vitamin B12/Folate, Serum Panel; Future    Iron Panel (Includes Ferritin, Iron Sat%, Iron, and TIBC); Future    Vitamin B 12 deficiency  We will check again.  To be noted that vitamin B12 can sometimes be reported as lower level during pregnancy.bhence methylmalonic acid will also be ordered.  Orders:    Vitamin B12/Folate, Serum Panel; Future    Iron Panel (Includes Ferritin, Iron Sat%, Iron, and TIBC); Future    Methylmalonic acid, serum; Future      Assessment & Plan        Return in about 6 weeks (around 7/3/2025).    History of Present Illness   Chief Complaint   Patient presents with    Consult     History of Present Illness  The patient presents for evaluation of postpartum hemorrhage.    She is currently in her third pregnancy, with a due date of 08/10/2025. She has been referred for a consultation by her maternal fetal medicine specialist due to a history of postpartum hemorrhage during her previous two pregnancies. Her first pregnancy in 02/2021 was uncomplicated, but she experienced an internal tear during delivery, which was managed locally. Her second pregnancy in 2023 was also normal, although she experienced significant illness and inability to retain food. Despite this, both she and the baby were healthy post-delivery. However, two weeks postpartum, she experienced a severe hemorrhage characterized by large blood clots,  necessitating emergency room treatment. An ultrasound at that time revealed no retained placenta or uterine tears.    She has a history of anemia during both pregnancies and suspects she may have a general predisposition to anemia. She has undergone tooth extractions without excessive bleeding but reports  gum bleeding. She is not vegan and has a history of vitamin B12 deficiency, which she believes is genetic as her mother and sisters also have it. She had stomach issues in her youth, but tests were inconclusive. She has not been diagnosed with pernicious anemia. She is currently on baby aspirin but reports difficulty swallowing pills during her last two pregnancies. She is taking prenatal vitamins, iron, vitamin B complex, and vitamin D in gummy form. She does not report excessive bruising. She reports no family history of bleeding disorders or other medical conditions. She reports no leg swelling, high blood pressure, or eclampsia during her current pregnancy. Her blood pressure readings have been consistently normal. She had proteinuria at her last visit, which she attributes to inadequate water intake.    Her menstrual cycles have been consistent since adolescence, characterized by heavy and painful periods for the first 2 to 3 days, followed by a few days of lighter flow.    FAMILY HISTORY  Her mother and sisters have a history of vitamin B12 deficiency.  She does not report any family history of bleeding disorders.     Review of Systems   Constitutional:  Negative for chills and fever.   HENT:  Negative for ear pain and sore throat.    Eyes:  Negative for pain and visual disturbance.   Respiratory:  Negative for cough and shortness of breath.    Cardiovascular:  Negative for chest pain and palpitations.   Gastrointestinal:  Negative for abdominal pain and vomiting.   Genitourinary:  Negative for dysuria and hematuria.   Musculoskeletal:  Negative for arthralgias and back pain.   Skin:  Negative for color  "change and rash.   Neurological:  Negative for seizures and syncope.   All other systems reviewed and are negative.    Medical History Reviewed by provider this encounter:     .      Objective   /70 (BP Location: Left arm, Patient Position: Sitting, Cuff Size: Large)   Pulse 85   Temp 98 °F (36.7 °C) (Temporal)   Resp 18   Ht 5' 7\" (1.702 m)   Wt 102 kg (225 lb)   LMP 11/03/2024 (Exact Date)   SpO2 98%   BMI 35.24 kg/m²     Physical Exam  Constitutional:       Appearance: Normal appearance. She is normal weight.   HENT:      Head: Normocephalic.      Nose: Nose normal.      Mouth/Throat:      Mouth: Mucous membranes are moist.     Eyes:      Extraocular Movements: Extraocular movements intact.      Pupils: Pupils are equal, round, and reactive to light.       Cardiovascular:      Rate and Rhythm: Normal rate.      Pulses: Normal pulses.   Pulmonary:      Effort: Pulmonary effort is normal.      Breath sounds: Normal breath sounds.   Abdominal:      General: Bowel sounds are normal.      Palpations: Abdomen is soft.      Tenderness: There is no abdominal tenderness.     Musculoskeletal:         General: Normal range of motion.      Cervical back: Normal range of motion.   Lymphadenopathy:      Cervical: No cervical adenopathy.     Skin:     General: Skin is warm.     Neurological:      General: No focal deficit present.      Mental Status: She is alert and oriented to person, place, and time. Mental status is at baseline.       Physical Exam  Constitutional: No distress noted.  Cardiovascular: No high blood pressure or eclampsia noted.  Genitourinary: No protein in urine noted.  Integument/Skin: No bruising noted.  Hematologic/Lymphatic: Gum bleeding noted, no excessive bleeding after tooth extractions.    Results    Labs: I have reviewed the following labs:  Results for orders placed or performed in visit on 05/06/25   POCT urine dip   Result Value Ref Range    POCT URINE PROTEIN 30     GLUCOSE, UA neg " "     Lab Results   Component Value Date/Time    WBC 9.32 01/30/2025 10:42 AM    RBC 4.13 01/30/2025 10:42 AM    Hemoglobin 12.3 01/30/2025 10:42 AM    Hematocrit 39.6 01/30/2025 10:42 AM    MCV 96 01/30/2025 10:42 AM    MCH 29.8 01/30/2025 10:42 AM    RDW 12.6 01/30/2025 10:42 AM    Platelets 403 (H) 01/30/2025 10:42 AM    Segmented % 77 (H) 01/30/2025 10:42 AM    Lymphocytes % 17 01/30/2025 10:42 AM    Monocytes % 6 01/30/2025 10:42 AM    Eosinophils Relative 0 01/30/2025 10:42 AM    Basophils Relative 0 01/30/2025 10:42 AM    Immature Grans % 0 01/30/2025 10:42 AM    Absolute Neutrophils 7.03 01/30/2025 10:42 AM      Lab Results   Component Value Date/Time    Sodium 134 (L) 01/30/2025 10:42 AM    Potassium 3.3 (L) 01/30/2025 10:42 AM    Chloride 100 01/30/2025 10:42 AM    CO2 24 01/30/2025 10:42 AM    ANION GAP 10 01/30/2025 10:42 AM    BUN 8 01/30/2025 10:42 AM    Creatinine 0.53 (L) 01/30/2025 10:42 AM    Glucose 94 01/30/2025 10:42 AM    Calcium 9.4 01/30/2025 10:42 AM    AST 13 01/30/2025 10:42 AM    ALT 13 01/30/2025 10:42 AM    Alkaline Phosphatase 46 01/30/2025 10:42 AM    Total Protein 6.9 01/30/2025 10:42 AM    Albumin 3.8 01/30/2025 10:42 AM    Total Bilirubin 1.15 (H) 01/30/2025 10:42 AM    eGFR 121 01/30/2025 10:42 AM      No results found for: \"IRON\", \"CONCFE\", \"FERRITIN\", \"UZLMLFSC90\", \"FOLATE\", \"COPPER\", \"EPOREFLAB\", \"ERYTHROPRO\", \"ESR\", \"CRP\", \"HIVAGAB\", \"HEPATITIS\"     Radiology Results Review: I have reviewed radiology reports from relevant studies including: Ultrasound(s).    Administrative Statements   I have spent a total time of 55 minutes in caring for this patient on the day of the visit/encounter including Diagnostic results, Prognosis, Risks and benefits of tx options, Instructions for management, Patient and family education, Importance of tx compliance, Risk factor reductions, Impressions, Counseling / Coordination of care, Documenting in the medical record, Reviewing/placing orders in " the medical record (including tests, medications, and/or procedures), and Obtaining or reviewing history  .

## 2025-05-22 NOTE — ASSESSMENT & PLAN NOTE
We will check again.  To be noted that vitamin B12 can sometimes be reported as lower level during pregnancy.bhence methylmalonic acid will also be ordered.  Orders:    Vitamin B12/Folate, Serum Panel; Future    Iron Panel (Includes Ferritin, Iron Sat%, Iron, and TIBC); Future    Methylmalonic acid, serum; Future

## 2025-05-22 NOTE — ASSESSMENT & PLAN NOTE
I explained to the patient that most common causes of postpartum hemorrhage are uterine atony followed by trauma followed by retained placenta and in around 3% of cases of primary hematological disorder like a coagulopathy.  In her case on review of medical records from her prior pregnancies it seems she has had uterine atony.  However given history of heavy menstrual cycles and gum bleeding we will look for any primary hematological disorder like a coagulopathy underlying a factor deficiency or von Willebrand disorder which is one of the more common bleeding disorders contributing to the postpartum hemorrhage.  In this aspect will send for a comprehensive panel at this time including platelet function studies PT PTT von Willebrand profile fibrinogen thrombin time.  I did  her that during pregnancy the levels of some of the coagulation parameters may be affected and sometimes may be falsely elevated as well hence test results may not be completely accurate.  Will also send for liver function test to look for any pregnancy related abnormalities as they can also contribute to hemorrhage.  She has received oxytocin and ergot derivatives during her prior pregnancies as per OB/GYN.  Will have her follow-up to discuss this findings of the blood work if any primary hematological disorder is discerned then management will be planned accordingly.  If not then management will be per OB/GYN for uterine atony.  Tranexamic acid or can also be considered as she has no history of any thromboembolic disorders in the past.  A hematological consultation can also be considered as inpatient.  Active monitoring of coagulation factors especially fibrinogen PT PTT and viscoelastic testing is recommended to look for any consumptive coagulopathy if postpartum hemorrhage recurs so as to guide factor replacement accordingly.  Orders:    Protime-INR; Future    APTT; Future    Von Willebrand Comprehensive Panel; Future    Platelet  Aggregation Test; Future    CBC and differential; Future    Comprehensive metabolic panel; Future    Thrombin time; Future    Fibrinogen; Future

## 2025-05-22 NOTE — ASSESSMENT & PLAN NOTE
We will check and if deficiency noted recommend replacement before her delivery.  She is on oral supplements.  Orders:    Vitamin B12/Folate, Serum Panel; Future    Iron Panel (Includes Ferritin, Iron Sat%, Iron, and TIBC); Future

## 2025-05-23 ENCOUNTER — RESULTS FOLLOW-UP (OUTPATIENT)
Dept: OBGYN CLINIC | Facility: CLINIC | Age: 40
End: 2025-05-23

## 2025-05-23 DIAGNOSIS — D50.8 OTHER IRON DEFICIENCY ANEMIA: Primary | ICD-10-CM

## 2025-05-23 LAB
FACT XIIIA PPP-ACNC: 232 % (ref 56–140)
TREPONEMA PALLIDUM IGG+IGM AB [PRESENCE] IN SERUM OR PLASMA BY IMMUNOASSAY: NORMAL
VWF AG ACT/NOR PPP IA: 84 % (ref 50–200)

## 2025-05-24 LAB
FACT XIIIA PPP-ACNC: 163 % (ref 56–140)
VWF:RCO ACT/NOR PPP PL AGG: 112 % (ref 50–200)
VWF:RCO ACT/NOR PPP PL AGG: 152 % (ref 50–200)

## 2025-05-25 LAB — METHYLMALONATE SERPL-SCNC: 151 NMOL/L (ref 0–378)

## 2025-05-28 LAB — FACT VIII AG ACT/NOR PPP IA: 133 %

## 2025-05-28 NOTE — PROGRESS NOTES
Prenatal Visit  Prenatal Visit  Name: Alexandra Espinosa      : 1985      MRN: [id number]  Encounter Provider: Hoda Woodward PA-C  Encounter Date: 2025   Encounter department: [facility]    :  Assessment & Plan  26 weeks gestation of pregnancy  Pap 2/3/25 NLM   GC/CT:neg  PN1 Labs: UTD  Blood Type:  A+  MFM Level 2:7/3/25  AFP:  neg  28 Week Labs: UTD  TDap: given today   Flu: 2/3/25  GBS:   Blue Folder: given   Yellow Folder: given today   Ped Referral :  Breast pump: ordered today   L&D forms: Patient has  Delivery consent:  sign today  Orders:    POCT urine dip    History of postpartum hemorrhage, currently pregnant    Patient saw hematology 2025, please see their note for recommendations regarding delivery and management of postpartum hemorrhage  Coagulopathy panel ordered  Patient has a follow-up on 2025    AMA (advanced maternal age) multigravida 35+, third trimester    Patient was not taking her ASA because she would throw it up every time she tried taking it, she is going to try taking it again to see if she is able to tolerate it better now    Obesity affecting pregnancy, antepartum, unspecified obesity type    TW pounds  Encouraged healthy diet and exercise    History of gestational diabetes in prior pregnancy, currently pregnant    1 hour GTT WNL    Varicose veins during pregnancy    Has not tried compression stockings yet, just ordered them  Varicose veins are soft on exam    Prenatal care, third trimester     29w3d  Doing well  Patient's urine showed protein and glucose, urine was dark patient endorses poor hydration-continue to monitor, adequate hydration encouraged  Reviewed shanda sahu ctx and s/s PTL.   Precautions reviewed to call for - Vaginal bldg, LOF, abnormal d/c or > 4 ctx in an hour.     Patient states if she requires a  she would like a bilateral salpingectomy.    Questions have been answered to her  satisfaction.    The patient was counseled about the labor process. She was counseled regarding potential reasons that she may need a  section including arrest of dilation/descent, non-reassuring fetal status, or worsening maternal status.      She was counseled on the risks of  including bleeding, infection, and injury to surrounding structures including the bowel and bladder. She was counseled that there are medical and surgical methods to manage excessive postpartum bleeding. She was counseled that in the event of excessive blood loss, she may require blood transfusion which includes a small risk of blood borne diseases such as hepatitis and HIV. The patient is OK with receiving a blood transfusion if necessary.  The patient had an opportunity to ask questions and signed consent.      She was counseled about the possible need for operative delivery using the vacuum or forceps and the indications for doing so. She was counseled that there is a small risk of  complications including intracranial hemorrhage, lacerations, nerve damage or fracture as well as the increased risk for more severe maternal laceration. The patient signed consent.   Warning signs in 3rd trimester of pregnancy reviewed as well as PTL and pre-e precautions.  Fetal activity/fetal kick counts reviewed and to call with decreased   3rd trimester education folder given today along with form for breast pump if Breastfeeding desires.   Reviewed availability of Childbirth education classes offered by [facility] and encouraged    Return to office in 2 weeks or sooner if needed    Encounter for immunization    Orders:    Tdap Vaccine greater than or equal to 6yo        Subjective:   History of Present Illness   History of Present Illness     Alexandra is a 40 y.o.  29w3d here for Routine Prenatal Visit (29w4d)    She denies any cramping, LOF/unusual discharge or VB.  She has noted fetal movement.       Objective:  Objective    /72 (BP Location: Left arm, Patient Position: Sitting, Cuff Size: Large)   Wt 99.7 kg (219 lb 12.8 oz)   LMP 11/03/2024 (Exact Date)   BMI 34.43 kg/m²     Pregravid Weight/BMI: 93.9 kg (207 lb) (BMI 32.41)  Current Weight: 99.7 kg (219 lb 12.8 oz)   Total Weight Gain: 5.806 kg (12 lb 12.8 oz)     Fetal Heart Rate: 145 Fundal Height (cm): 29 cm      Hoda Woodward PA-C  5/30/2025

## 2025-05-29 ENCOUNTER — ROUTINE PRENATAL (OUTPATIENT)
Dept: OBGYN CLINIC | Facility: CLINIC | Age: 40
End: 2025-05-29

## 2025-05-29 VITALS — WEIGHT: 219.8 LBS | DIASTOLIC BLOOD PRESSURE: 72 MMHG | SYSTOLIC BLOOD PRESSURE: 122 MMHG | BODY MASS INDEX: 34.43 KG/M2

## 2025-05-29 DIAGNOSIS — Z34.93 PRENATAL CARE, THIRD TRIMESTER: ICD-10-CM

## 2025-05-29 DIAGNOSIS — O09.523 AMA (ADVANCED MATERNAL AGE) MULTIGRAVIDA 35+, THIRD TRIMESTER: ICD-10-CM

## 2025-05-29 DIAGNOSIS — Z23 ENCOUNTER FOR IMMUNIZATION: ICD-10-CM

## 2025-05-29 DIAGNOSIS — O09.299 HISTORY OF GESTATIONAL DIABETES IN PRIOR PREGNANCY, CURRENTLY PREGNANT: ICD-10-CM

## 2025-05-29 DIAGNOSIS — O22.00 VARICOSE VEINS DURING PREGNANCY: ICD-10-CM

## 2025-05-29 DIAGNOSIS — Z3A.26 26 WEEKS GESTATION OF PREGNANCY: Primary | ICD-10-CM

## 2025-05-29 DIAGNOSIS — Z86.32 HISTORY OF GESTATIONAL DIABETES IN PRIOR PREGNANCY, CURRENTLY PREGNANT: ICD-10-CM

## 2025-05-29 DIAGNOSIS — O09.299 HISTORY OF POSTPARTUM HEMORRHAGE, CURRENTLY PREGNANT: ICD-10-CM

## 2025-05-29 DIAGNOSIS — O99.210 OBESITY AFFECTING PREGNANCY, ANTEPARTUM, UNSPECIFIED OBESITY TYPE: ICD-10-CM

## 2025-05-29 LAB
SL AMB  POCT GLUCOSE, UA: 100
SL AMB POCT URINE PROTEIN: ABNORMAL

## 2025-05-29 RX ORDER — FERROUS SULFATE 325(65) MG
325 TABLET ORAL ONCE
COMMUNITY

## 2025-05-29 RX ORDER — MULTIVIT WITH MINERALS/LUTEIN
250 TABLET ORAL DAILY
COMMUNITY

## 2025-05-29 NOTE — ASSESSMENT & PLAN NOTE
Pap 2/3/25 NLM   GC/CT:neg  PN1 Labs: UTD  Blood Type:  A+  MFM Level 2:7/3/25  AFP:  neg  28 Week Labs: UTD  TDap: given today   Flu: 2/3/25  GBS:   Blue Folder: given   Yellow Folder: given today   Ped Referral :  Breast pump: ordered today   L&D forms: Patient has  Delivery consent:  sign today  Orders:    POCT urine dip

## 2025-05-29 NOTE — ASSESSMENT & PLAN NOTE
Patient saw hematology 5/22/2025, please see their note for recommendations regarding delivery and management of postpartum hemorrhage  Coagulopathy panel ordered  Patient has a follow-up on 7/7/2025

## 2025-05-29 NOTE — ASSESSMENT & PLAN NOTE
Patient was not taking her ASA because she would throw it up every time she tried taking it, she is going to try taking it again to see if she is able to tolerate it better now

## 2025-06-02 LAB — VWF MULTIMERS PPP IB: NORMAL

## 2025-06-05 ENCOUNTER — TELEPHONE (OUTPATIENT)
Dept: OBGYN CLINIC | Facility: CLINIC | Age: 40
End: 2025-06-05

## 2025-06-05 NOTE — TELEPHONE ENCOUNTER
Patient was called for  3rd  trimester follow up.  Unable to leave  a voicemail due to mailbox full.    MICHAELA Bauer  OB Nurse Navigator

## 2025-06-08 LAB
DME PARACHUTE DELIVERY DATE REQUESTED: NORMAL
DME PARACHUTE ITEM DESCRIPTION: NORMAL
DME PARACHUTE ORDER STATUS: NORMAL
DME PARACHUTE SUPPLIER NAME: NORMAL
DME PARACHUTE SUPPLIER PHONE: NORMAL

## 2025-06-12 ENCOUNTER — ROUTINE PRENATAL (OUTPATIENT)
Dept: OBGYN CLINIC | Facility: CLINIC | Age: 40
End: 2025-06-12

## 2025-06-12 VITALS
DIASTOLIC BLOOD PRESSURE: 82 MMHG | BODY MASS INDEX: 34.21 KG/M2 | OXYGEN SATURATION: 99 % | SYSTOLIC BLOOD PRESSURE: 122 MMHG | HEART RATE: 119 BPM | WEIGHT: 218.4 LBS

## 2025-06-12 DIAGNOSIS — O99.210 OBESITY AFFECTING PREGNANCY, ANTEPARTUM, UNSPECIFIED OBESITY TYPE: Primary | ICD-10-CM

## 2025-06-12 DIAGNOSIS — O09.299 HISTORY OF POSTPARTUM HEMORRHAGE, CURRENTLY PREGNANT: ICD-10-CM

## 2025-06-12 DIAGNOSIS — Z34.93 PRENATAL CARE, THIRD TRIMESTER: ICD-10-CM

## 2025-06-12 DIAGNOSIS — O09.523 AMA (ADVANCED MATERNAL AGE) MULTIGRAVIDA 35+, THIRD TRIMESTER: ICD-10-CM

## 2025-06-12 DIAGNOSIS — Z3A.26 26 WEEKS GESTATION OF PREGNANCY: ICD-10-CM

## 2025-06-12 PROCEDURE — PNV

## 2025-06-12 NOTE — PROGRESS NOTES
Prenatal Visit  Name: Alexandra Espinosa      : 1985      MRN: [id number]  Encounter Provider: Hoda Woodward PA-C  Encounter Date: 2025   Encounter department: [facility]    :  Assessment & Plan  Obesity affecting pregnancy, antepartum, unspecified obesity type     TW lbs    26 weeks gestation of pregnancy     Pap 2/3/25 NLM GC/CT:neg  PN1 Labs: UTD  Blood Type:  A+  MFM Level 1:  MFM Level 2:7/3/25  AFP:  neg  28 Week Labs:UTD  TDap: offer today   Flu: 2/3/25  GBS:   Blue Folder: given   Yellow Folder: received  Ped Referral : has  Breast pump: ordered  L&D forms: signed  Delivery consent: completed  Patient desires a bilateral salpingectomy only if she needs a     History of postpartum hemorrhage, currently pregnant    Saw hematology 2025  Patient has a follow-up hematology oncology appointment 2025    AMA (advanced maternal age) multigravida 35+, third trimester         Prenatal care, third trimester     , 31w4d  No major complaints today. Reports good FM    We again reviewed the S/S PTL and importance of consistent/regular FM. Reviewed process for [facility] and encouraged pt to call with any decreases in fetal movement    RTO in 2 weeks or sooner        History of Present Illness   History of Present Illness     Alexandra is a 40 y.o.  31w4d here for Routine Prenatal Visit (31w4d)    Denies unusual vaginal discharge, LOF, VB, or ctx. Reports Fetus is active.         Objective   /82 (BP Location: Left arm, Patient Position: Sitting, Cuff Size: Standard)   Wt 99.1 kg (218 lb 6.4 oz)   LMP 2024 (Exact Date)   BMI 34.21 kg/m²     Pregravid Weight/BMI: 93.9 kg (207 lb) (BMI 32.41)  Total Weight Gain: 5.171 kg (11 lb 6.4 oz)     Physical Exam  Constitutional:       Appearance: Normal appearance.   HENT:      Head: Normocephalic and atraumatic.     Eyes:      Extraocular Movements: Extraocular movements intact.      Pulmonary:      Effort: Pulmonary effort is normal.     Musculoskeletal:         General: Normal range of motion.     Neurological:      Mental Status: She is alert.     Skin:     General: Skin is warm and dry.     Psychiatric:         Mood and Affect: Mood normal.         Behavior: Behavior normal.       Fetal Heart Rate: 150  Fundal Height (cm): 31 cm     Hoda Woodward PA-C

## 2025-06-12 NOTE — ASSESSMENT & PLAN NOTE
Pap 2/3/25 NLM GC/CT:neg  PN1 Labs: UTD  Blood Type:  A+  MFM Level 1:  MFM Level 2:7/3/25  AFP:  neg  28 Week Labs:UTD  TDap: offer today   Flu: 2/3/25  GBS:   Blue Folder: given   Yellow Folder: received  Ped Referral : has  Breast pump: ordered  L&D forms: signed  Delivery consent: completed  Patient desires a bilateral salpingectomy only if she needs a

## 2025-06-20 LAB
DME PARACHUTE DELIVERY DATE ACTUAL: NORMAL
DME PARACHUTE DELIVERY DATE REQUESTED: NORMAL
DME PARACHUTE ITEM DESCRIPTION: NORMAL
DME PARACHUTE ORDER STATUS: NORMAL
DME PARACHUTE SUPPLIER NAME: NORMAL
DME PARACHUTE SUPPLIER PHONE: NORMAL

## 2025-06-25 ENCOUNTER — TELEPHONE (OUTPATIENT)
Age: 40
End: 2025-06-25

## 2025-06-25 NOTE — TELEPHONE ENCOUNTER
Offered p/t appt for tomorrow or Friday in [facility], both dates she declined. Patient can only come to the [place] office. She is only available tomorrow anytime and Friday after 3.

## 2025-06-26 PROBLEM — Z3A.34 34 WEEKS GESTATION OF PREGNANCY: Status: ACTIVE | Noted: 2025-06-26

## 2025-07-03 ENCOUNTER — ULTRASOUND (OUTPATIENT)
Dept: PERINATAL CARE | Facility: OTHER | Age: 40
End: 2025-07-03
Payer: COMMERCIAL

## 2025-07-03 ENCOUNTER — ANCILLARY PROCEDURE (OUTPATIENT)
Dept: PERINATAL CARE | Facility: OTHER | Age: 40
End: 2025-07-03
Attending: OBSTETRICS & GYNECOLOGY
Payer: COMMERCIAL

## 2025-07-03 VITALS
HEART RATE: 86 BPM | HEIGHT: 67 IN | BODY MASS INDEX: 34.21 KG/M2 | WEIGHT: 218 LBS | DIASTOLIC BLOOD PRESSURE: 76 MMHG | SYSTOLIC BLOOD PRESSURE: 126 MMHG

## 2025-07-03 DIAGNOSIS — O09.523 AMA (ADVANCED MATERNAL AGE) MULTIGRAVIDA 35+, THIRD TRIMESTER: Primary | ICD-10-CM

## 2025-07-03 DIAGNOSIS — Z3A.34 34 WEEKS GESTATION OF PREGNANCY: ICD-10-CM

## 2025-07-03 PROCEDURE — 76816 OB US FOLLOW-UP PER FETUS: CPT | Performed by: STUDENT IN AN ORGANIZED HEALTH CARE EDUCATION/TRAINING PROGRAM

## 2025-07-03 PROCEDURE — 99213 OFFICE O/P EST LOW 20 MIN: CPT

## 2025-07-03 NOTE — PROGRESS NOTES
"FOLLOW-UP: MATERNAL-FETAL MEDICINE  Name: Alexandra Espinosa      : 1985      MRN: [id number]  Encounter Provider: Nusrat Decker MD  Encounter Date: 7/3/2025   Encounter department: [facility]  :  Assessment & Plan  AMA (advanced maternal age) multigravida 35+, third trimester  A follow-up growth ultrasound is recommended in 4 weeks.    34 weeks gestation of pregnancy  Discussed fetal growth today, which is on the higher end of normal given the gestational age.  Estimated fetal weight is in the 88th percentile (2899 g), with an abdominal circumference in the 90th percentile.  Amniotic fluid was within normal limits.  The patient has had 2 complicated uncomplicated 's with neonates ranging in weight from 7 pounds 8.3 ounces to 8 pounds 3.2 ounces.  She also has a history of gestational diabetes in a prior pregnancy, but had a normal 1 hour glucose tolerance screening on 2025, with the result of 126.    She should return for follow up growth assessment in 4 weeks to assess fetal growth. The patient desires a 39 week induction, which she knows is scheduled through her OBs office.      History of Present Illness     Alexandra Espinosa is a 40 y.o. female  at 30w1d who presents for fetal growth and followup missed anatomy ultrasound. She reports increased vaginal pressure. She is currently denying loss of fluid, vaginal bleeding, or decreased fetal movement.  We discussed  labor precautions in detail and when to escalate care to her OB to be evaluated further.    Objective   /76 (BP Location: Right arm, Patient Position: Sitting, Cuff Size: Standard)   Pulse 86   Ht 5' 7\" (1.702 m)   Wt 98.9 kg (218 lb)   LMP 2024 (Exact Date)   BMI 34.14 kg/m²      Patient's last menstrual period was 2024 (exact date).  Estimated Delivery Date: 2025, by Last Menstrual Period    Please refer to \"Imaging\" for ultrasound report from today's " visit.     Administrative Statements   MDM:  I. Diagnoses/Problems addressed: Fetal growth, AMA  II.  Data: I reviewed OB notes and prenatal lab tests ordered by another provider.  III.  Risk of morbidity: Low    Evaluation and Management:   Medical decision making for this encounter was low.

## 2025-07-03 NOTE — ASSESSMENT & PLAN NOTE
Discussed fetal growth today, which is on the higher end of normal given the gestational age.  Estimated fetal weight is in the 88th percentile (2899 g), with an abdominal circumference in the 90th percentile.  Amniotic fluid was within normal limits.  The patient has had 2 complicated uncomplicated 's with neonates ranging in weight from 7 pounds 8.3 ounces to 8 pounds 3.2 ounces.  She also has a history of gestational diabetes in a prior pregnancy, but had a normal 1 hour glucose tolerance screening on 2025, with the result of 126.    She should return for follow up growth assessment in 4 weeks to assess fetal growth. The patient desires a 39 week induction, which she knows is scheduled through her OBs office.

## 2025-07-07 ENCOUNTER — TELEMEDICINE (OUTPATIENT)
Dept: HEMATOLOGY ONCOLOGY | Facility: CLINIC | Age: 40
End: 2025-07-07
Payer: COMMERCIAL

## 2025-07-07 DIAGNOSIS — E53.8 VITAMIN B 12 DEFICIENCY: ICD-10-CM

## 2025-07-07 DIAGNOSIS — D50.8 OTHER IRON DEFICIENCY ANEMIA: Primary | ICD-10-CM

## 2025-07-07 DIAGNOSIS — O09.299 HISTORY OF POSTPARTUM HEMORRHAGE, CURRENTLY PREGNANT: ICD-10-CM

## 2025-07-07 PROCEDURE — 99214 OFFICE O/P EST MOD 30 MIN: CPT | Performed by: INTERNAL MEDICINE

## 2025-07-07 NOTE — ASSESSMENT & PLAN NOTE
Normal methylmalonic acid level.  Patient however reports that she has had chronic B12 deficiency.  Also has family history of B12 deficiency.  Usually does not respond to oral replacement.  Will repeat.  Given this history we will also send for pernicious anemia workup given concurrent iron deficiency as well.  Orders:  •  Iron Panel (Includes Ferritin, Iron Sat%, Iron, and TIBC); Future  •  Vitamin B12/Folate, Serum Panel; Future  •  CBC and differential; Future  •  Intrinsic factor blocking antibody; Future  •  Anti-parietal antibody; Future

## 2025-07-07 NOTE — ASSESSMENT & PLAN NOTE
Iron deficiency anemia noted.  Will repeat indicis.  Patient has been on oral supplementation.  If no response then would plan for IV iron replacement therapy before she goes for her delivery.  May also help with preventing hemorrhage.  Orders:  •  Iron Panel (Includes Ferritin, Iron Sat%, Iron, and TIBC); Future  •  Vitamin B12/Folate, Serum Panel; Future  •  CBC and differential; Future

## 2025-07-07 NOTE — PROGRESS NOTES
Name: Alexandra Espinosa      : 1985      MRN: [id number]  Encounter Provider: Maria Luz Gong  Encounter Date: 2025   Encounter department: [facility] [place]  :  Administrative Statements   Encounter provider Maria Luz Gong    The Patient is located at Home and in the following state in which I hold an active license [place].    The patient was identified by name and date of birth. Alexandra Espinosa was informed that this is a telemedicine visit and that the visit is being conducted through the Epic Embedded platform. She agrees to proceed..  My office door was closed. No one else was in the room.  She acknowledged consent and understanding of privacy and security of the video platform. The patient has agreed to participate and understands they can discontinue the visit at any time.    I have spent a total time of 30 minutes in caring for this patient on the day of the visit/encounter including Diagnostic results, Prognosis, Risks and benefits of tx options, Instructions for management, Patient and family education, Importance of tx compliance, Risk factor reductions, Impressions, Counseling / Coordination of care, Documenting in the medical record, Reviewing/placing orders in the medical record (including tests, medications, and/or procedures), and Obtaining or reviewing history  , not including the time spent for establishing the audio/video connection.     Assessment & Plan  Other iron deficiency anemia  Iron deficiency anemia noted.  Will repeat indicis.  Patient has been on oral supplementation.  If no response then would plan for IV iron replacement therapy before she goes for her delivery.  May also help with preventing hemorrhage.  Orders:  •  Iron Panel (Includes Ferritin, Iron Sat%, Iron, and TIBC); Future  •  Vitamin B12/Folate, Serum Panel; Future  •  CBC and differential; Future    History of postpartum hemorrhage, currently pregnant  I explained to the patient  that most common causes of postpartum hemorrhage are uterine atony followed by trauma followed by retained placenta and in around 3% of cases of primary hematological disorder like a coagulopathy.  In her case on review of medical records from her prior pregnancies it seems she has had uterine atony.  However given history of heavy menstrual cycles and gum bleeding we will look for any primary hematological disorder-this workup was essentially unremarkable in terms of normal PT PTT elevated fibrinogen normal thrombin time and normal Willebrand profile.   I did  her that during pregnancy the levels of some of the coagulation parameters may be affected and sometimes may be falsely elevated as well hence test results may not be completely accurate.   management will be per OB/GYN for uterine atony.  Tranexamic acid or can also be considered as she has no history of any thromboembolic disorders in the past.  A hematological consultation can also be considered as inpatient.  Active monitoring of coagulation factors especially fibrinogen PT PTT and viscoelastic testing is recommended to look for any consumptive coagulopathy if postpartum hemorrhage recurs so as to guide factor replacement accordingly.       Vitamin B 12 deficiency  Normal methylmalonic acid level.  Patient however reports that she has had chronic B12 deficiency.  Also has family history of B12 deficiency.  Usually does not respond to oral replacement.  Will repeat.  Given this history we will also send for pernicious anemia workup given concurrent iron deficiency as well.  Orders:  •  Iron Panel (Includes Ferritin, Iron Sat%, Iron, and TIBC); Future  •  Vitamin B12/Folate, Serum Panel; Future  •  CBC and differential; Future  •  Intrinsic factor blocking antibody; Future  •  Anti-parietal antibody; Future      Assessment & Plan        Return in about 3 months (around 10/7/2025).    History of Present Illness   No chief complaint on file.    History  of Present Illness  The patient presents for evaluation of postpartum hemorrhage.    She is currently in her third pregnancy, with a due date of 08/10/2025. She has been referred for a consultation by her maternal fetal medicine specialist due to a history of postpartum hemorrhage during her previous two pregnancies. Her first pregnancy in 02/2021 was uncomplicated, but she experienced an internal tear during delivery, which was managed locally. Her second pregnancy in 2023 was also normal, although she experienced significant illness and inability to retain food. Despite this, both she and the baby were healthy post-delivery. However, two weeks postpartum, she experienced a severe hemorrhage characterized by large blood clots, necessitating emergency room treatment. An ultrasound at that time revealed no retained placenta or uterine tears.    She has a history of anemia during both pregnancies and suspects she may have a general predisposition to anemia. She has undergone tooth extractions without excessive bleeding but reports  gum bleeding. She is not vegan and has a history of vitamin B12 deficiency, which she believes is genetic as her mother and sisters also have it. She had stomach issues in her youth, but tests were inconclusive. She has not been diagnosed with pernicious anemia. She is currently on baby aspirin but reports difficulty swallowing pills during her last two pregnancies. She is taking prenatal vitamins, iron, vitamin B complex, and vitamin D in gummy form. She does not report excessive bruising. She reports no family history of bleeding disorders or other medical conditions. She reports no leg swelling, high blood pressure, or eclampsia during her current pregnancy. Her blood pressure readings have been consistently normal. She had proteinuria at her last visit, which she attributes to inadequate water intake.    Her menstrual cycles have been consistent since adolescence, characterized by heavy  and painful periods for the first 2 to 3 days, followed by a few days of lighter flow.    FAMILY HISTORY  Her mother and sisters have a history of vitamin B12 deficiency.  She does not report any family history of bleeding disorders.     Review of Systems   Constitutional:  Negative for chills and fever.   HENT:  Negative for ear pain and sore throat.    Eyes:  Negative for pain and visual disturbance.   Respiratory:  Negative for cough and shortness of breath.    Cardiovascular:  Negative for chest pain and palpitations.   Gastrointestinal:  Negative for abdominal pain and vomiting.   Genitourinary:  Negative for dysuria and hematuria.   Musculoskeletal:  Negative for arthralgias and back pain.   Skin:  Negative for color change and rash.   Neurological:  Negative for seizures and syncope.   All other systems reviewed and are negative.    Medical History Reviewed by provider this encounter:     .      Objective   LMP 11/03/2024 (Exact Date)         Results    Labs: I have reviewed the following labs:  Results for orders placed or performed in visit on 05/29/25   POCT urine dip   Result Value Ref Range    POCT URINE PROTEIN 30+     GLUCOSE,       Lab Results   Component Value Date/Time    WBC 9.52 05/22/2025 11:25 AM    RBC 3.76 (L) 05/22/2025 11:25 AM    Hemoglobin 10.7 (L) 05/22/2025 11:25 AM    Hematocrit 33.9 (L) 05/22/2025 11:25 AM    MCV 90 05/22/2025 11:25 AM    MCH 28.5 05/22/2025 11:25 AM    RDW 12.7 05/22/2025 11:25 AM    Platelets 391 (H) 05/22/2025 11:25 AM    Segmented % 75 05/22/2025 11:25 AM    Lymphocytes % 17 05/22/2025 11:25 AM    Monocytes % 6 05/22/2025 11:25 AM    Eosinophils Relative 1 05/22/2025 11:25 AM    Basophils Relative 0 05/22/2025 11:25 AM    Immature Grans % 1 05/22/2025 11:25 AM    Absolute Neutrophils 7.22 05/22/2025 11:25 AM      Lab Results   Component Value Date/Time    Sodium 135 05/22/2025 11:25 AM    Potassium 3.3 (L) 05/22/2025 11:25 AM    Chloride 100 05/22/2025 11:25  AM    CO2 22 05/22/2025 11:25 AM    ANION GAP 13 05/22/2025 11:25 AM    BUN 7 05/22/2025 11:25 AM    Creatinine 0.53 (L) 05/22/2025 11:25 AM    Glucose 94 01/30/2025 10:42 AM    Glucose, Fasting 122 (H) 05/22/2025 11:25 AM    Calcium 9.6 05/22/2025 11:25 AM    AST 13 05/22/2025 11:25 AM    ALT 11 05/22/2025 11:25 AM    Alkaline Phosphatase 63 05/22/2025 11:25 AM    Total Protein 7.4 05/22/2025 11:25 AM    Albumin 3.7 05/22/2025 11:25 AM    Total Bilirubin 1.44 (H) 05/22/2025 11:25 AM    eGFR 120 05/22/2025 11:25 AM      Lab Results   Component Value Date/Time    Iron 41 (L) 05/22/2025 11:25 AM    Iron Saturation 6 (L) 05/22/2025 11:25 AM    Ferritin 4 (L) 05/22/2025 11:25 AM    Vitamin B-12 96 (L) 05/22/2025 11:25 AM    Folate 15.3 05/22/2025 11:25 AM        Radiology Results Review: I have reviewed radiology reports from relevant studies including: Ultrasound(s).    Administrative Statements

## 2025-07-07 NOTE — ASSESSMENT & PLAN NOTE
I explained to the patient that most common causes of postpartum hemorrhage are uterine atony followed by trauma followed by retained placenta and in around 3% of cases of primary hematological disorder like a coagulopathy.  In her case on review of medical records from her prior pregnancies it seems she has had uterine atony.  However given history of heavy menstrual cycles and gum bleeding we will look for any primary hematological disorder-this workup was essentially unremarkable in terms of normal PT PTT elevated fibrinogen normal thrombin time and normal Willebrand profile.   I did  her that during pregnancy the levels of some of the coagulation parameters may be affected and sometimes may be falsely elevated as well hence test results may not be completely accurate.   management will be per OB/GYN for uterine atony.  Tranexamic acid or can also be considered as she has no history of any thromboembolic disorders in the past.  A hematological consultation can also be considered as inpatient.  Active monitoring of coagulation factors especially fibrinogen PT PTT and viscoelastic testing is recommended to look for any consumptive coagulopathy if postpartum hemorrhage recurs so as to guide factor replacement accordingly.      Deep Sutures: 5-0 PDS

## 2025-07-09 ENCOUNTER — APPOINTMENT (OUTPATIENT)
Dept: LAB | Facility: CLINIC | Age: 40
End: 2025-07-09
Payer: COMMERCIAL

## 2025-07-09 DIAGNOSIS — E53.8 VITAMIN B 12 DEFICIENCY: ICD-10-CM

## 2025-07-09 DIAGNOSIS — O09.299 HISTORY OF POSTPARTUM HEMORRHAGE, CURRENTLY PREGNANT: ICD-10-CM

## 2025-07-09 DIAGNOSIS — D50.8 OTHER IRON DEFICIENCY ANEMIA: Primary | ICD-10-CM

## 2025-07-09 LAB
BASOPHILS # BLD AUTO: 0.02 THOUSANDS/ÂΜL (ref 0–0.1)
BASOPHILS NFR BLD AUTO: 0 % (ref 0–1)
EOSINOPHIL # BLD AUTO: 0.05 THOUSAND/ÂΜL (ref 0–0.61)
EOSINOPHIL NFR BLD AUTO: 1 % (ref 0–6)
ERYTHROCYTE [DISTWIDTH] IN BLOOD BY AUTOMATED COUNT: 15.2 % (ref 11.6–15.1)
FERRITIN SERPL-MCNC: 5 NG/ML (ref 30–307)
FOLATE SERPL-MCNC: 11.2 NG/ML
HCT VFR BLD AUTO: 32.9 % (ref 34.8–46.1)
HGB BLD-MCNC: 10.7 G/DL (ref 11.5–15.4)
IMM GRANULOCYTES # BLD AUTO: 0.05 THOUSAND/UL (ref 0–0.2)
IMM GRANULOCYTES NFR BLD AUTO: 1 % (ref 0–2)
IRON SATN MFR SERPL: 8 % (ref 15–50)
IRON SERPL-MCNC: 57 UG/DL (ref 50–212)
LYMPHOCYTES # BLD AUTO: 1.57 THOUSANDS/ÂΜL (ref 0.6–4.47)
LYMPHOCYTES NFR BLD AUTO: 19 % (ref 14–44)
MCH RBC QN AUTO: 28.5 PG (ref 26.8–34.3)
MCHC RBC AUTO-ENTMCNC: 32.5 G/DL (ref 31.4–37.4)
MCV RBC AUTO: 88 FL (ref 82–98)
MONOCYTES # BLD AUTO: 0.95 THOUSAND/ÂΜL (ref 0.17–1.22)
MONOCYTES NFR BLD AUTO: 11 % (ref 4–12)
NEUTROPHILS # BLD AUTO: 5.67 THOUSANDS/ÂΜL (ref 1.85–7.62)
NEUTS SEG NFR BLD AUTO: 68 % (ref 43–75)
NRBC BLD AUTO-RTO: 0 /100 WBCS
PLATELET # BLD AUTO: 322 THOUSANDS/UL (ref 149–390)
PMV BLD AUTO: 11.2 FL (ref 8.9–12.7)
RBC # BLD AUTO: 3.76 MILLION/UL (ref 3.81–5.12)
TIBC SERPL-MCNC: 676.2 UG/DL (ref 250–450)
TRANSFERRIN SERPL-MCNC: 483 MG/DL (ref 203–362)
UIBC SERPL-MCNC: 619 UG/DL (ref 155–355)
VIT B12 SERPL-MCNC: 84 PG/ML (ref 180–914)
WBC # BLD AUTO: 8.31 THOUSAND/UL (ref 4.31–10.16)

## 2025-07-09 PROCEDURE — 86340 INTRINSIC FACTOR ANTIBODY: CPT

## 2025-07-09 PROCEDURE — 83540 ASSAY OF IRON: CPT

## 2025-07-09 PROCEDURE — 82728 ASSAY OF FERRITIN: CPT

## 2025-07-09 PROCEDURE — 83516 IMMUNOASSAY NONANTIBODY: CPT

## 2025-07-09 PROCEDURE — 82746 ASSAY OF FOLIC ACID SERUM: CPT

## 2025-07-09 PROCEDURE — 82607 VITAMIN B-12: CPT

## 2025-07-09 PROCEDURE — 85025 COMPLETE CBC W/AUTO DIFF WBC: CPT

## 2025-07-09 PROCEDURE — 36415 COLL VENOUS BLD VENIPUNCTURE: CPT

## 2025-07-09 PROCEDURE — 83550 IRON BINDING TEST: CPT

## 2025-07-09 NOTE — PROGRESS NOTES
Prenatal Visit  Name: Alexandra Espinosa      : 1985      MRN: [id number]  Encounter Provider: Hoda Woodward PA-C  Encounter Date: 7/10/2025   Encounter department: [facility] [place]    :  Assessment & Plan  35 weeks gestation of pregnancy      Pap 2/3/25 NILM GC/CT:neg  PN1 Labs: UTD  Blood Type:  A+  MFM Level 1:  MFM Level 2:7/3/25  AFP:  neg  28 Week Labs:UTD  TDap: 25   Flu: 2/3/25  GBS: Will collect at next visit  Blue Folder: given   Yellow Folder: received  Ped Referral : has  Breast pump: ordered  L&D forms: signed  Delivery consent: completed  Patient desires a bilateral salpingectomy only if she needs a     Prenatal care, third trimester     No S/S Labor, fetus remains active.  GBS culture was not collected at this visit.  Should be collected at next visit   I have reviewed the signs and symptoms of labor with the patient, including contractions q4-5 minutes for greater than 1 hour, vaginal bleeding, leaking fluid and decreased fetal movement.  I have emphasized the continued importance of paying close attention to the baby's movements.  I have instructed the patient to call the office with any of the above symptoms prior to coming to the hospital.   RTO in 1 week or sooner if needed    History of postpartum hemorrhage, currently pregnant     Patient seen by hematology/oncology on 2025     AMA (advanced maternal age) multigravida 35+, third trimester     Patient was not taking her ASA    Obesity affecting pregnancy, antepartum, unspecified obesity type     TW lbs    History of gestational diabetes in prior pregnancy, currently pregnant     1 hr GTT WNL    Vitamin B 12 deficiency    2025 B12 84  Patient was advised to start IM B12 injections 3 times weekly for 2 weeks by hematology    Other iron deficiency anemia    Hematology oncology ordered iron infusions for iron deficiency anemia            History of Present Illness    History of Present Illness     Alexandra is a 40 y.o.  35w3d here for a routine PNC  Denies unusual vaginal discharge, LOF, VB, or ctx. Reports Fetus is active.     Patient notes a pelvic pressure sensation    Objective   /78   Pulse 89   Wt 99.6 kg (219 lb 9.6 oz)   LMP 2024 (Exact Date)   SpO2 98%   BMI 34.39 kg/m²     Pregravid Weight/BMI: 93.9 kg (207 lb) (BMI 32.41)  Total Weight Gain: 5.715 kg (12 lb 9.6 oz)     Physical Exam  Constitutional:       Appearance: Normal appearance.   HENT:      Head: Normocephalic and atraumatic.     Eyes:      Extraocular Movements: Extraocular movements intact.     Pulmonary:      Effort: Pulmonary effort is normal.     Musculoskeletal:         General: Normal range of motion.     Neurological:      Mental Status: She is alert.     Skin:     General: Skin is warm and dry.     Psychiatric:         Mood and Affect: Mood normal.         Behavior: Behavior normal.       Fetal Heart Rate: 135 , Fundal Height (cm): 35 cm   Hoda Woodward PA-C

## 2025-07-09 NOTE — ASSESSMENT & PLAN NOTE
Pap 2/3/25 NILM GC/CT:neg  PN1 Labs: UTD  Blood Type:  A+  MFM Level 1:  MFM Level 2:7/3/25  AFP:  neg  28 Week Labs:UTD  TDap: 25   Flu: 2/3/25  GBS: Will collect at next visit  Blue Folder: given   Yellow Folder: received  Ped Referral : has  Breast pump: ordered  L&D forms: signed  Delivery consent: completed  Patient desires a bilateral salpingectomy only if she needs a

## 2025-07-10 ENCOUNTER — RESULTS FOLLOW-UP (OUTPATIENT)
Dept: HEMATOLOGY ONCOLOGY | Facility: CLINIC | Age: 40
End: 2025-07-10

## 2025-07-10 ENCOUNTER — ROUTINE PRENATAL (OUTPATIENT)
Dept: OBGYN CLINIC | Facility: CLINIC | Age: 40
End: 2025-07-10

## 2025-07-10 ENCOUNTER — TELEPHONE (OUTPATIENT)
Age: 40
End: 2025-07-10

## 2025-07-10 VITALS
WEIGHT: 219.6 LBS | SYSTOLIC BLOOD PRESSURE: 114 MMHG | BODY MASS INDEX: 34.39 KG/M2 | HEART RATE: 89 BPM | DIASTOLIC BLOOD PRESSURE: 78 MMHG | OXYGEN SATURATION: 98 %

## 2025-07-10 DIAGNOSIS — D50.8 OTHER IRON DEFICIENCY ANEMIA: ICD-10-CM

## 2025-07-10 DIAGNOSIS — E53.8 VITAMIN B 12 DEFICIENCY: ICD-10-CM

## 2025-07-10 DIAGNOSIS — Z86.32 HISTORY OF GESTATIONAL DIABETES IN PRIOR PREGNANCY, CURRENTLY PREGNANT: ICD-10-CM

## 2025-07-10 DIAGNOSIS — Z34.93 PRENATAL CARE, THIRD TRIMESTER: ICD-10-CM

## 2025-07-10 DIAGNOSIS — Z3A.35 35 WEEKS GESTATION OF PREGNANCY: Primary | ICD-10-CM

## 2025-07-10 DIAGNOSIS — O09.299 HISTORY OF POSTPARTUM HEMORRHAGE, CURRENTLY PREGNANT: ICD-10-CM

## 2025-07-10 DIAGNOSIS — Z3A.34 34 WEEKS GESTATION OF PREGNANCY: Primary | ICD-10-CM

## 2025-07-10 DIAGNOSIS — O99.210 OBESITY AFFECTING PREGNANCY, ANTEPARTUM, UNSPECIFIED OBESITY TYPE: ICD-10-CM

## 2025-07-10 DIAGNOSIS — O09.523 AMA (ADVANCED MATERNAL AGE) MULTIGRAVIDA 35+, THIRD TRIMESTER: ICD-10-CM

## 2025-07-10 DIAGNOSIS — O09.299 HISTORY OF GESTATIONAL DIABETES IN PRIOR PREGNANCY, CURRENTLY PREGNANT: ICD-10-CM

## 2025-07-10 PROCEDURE — PNV

## 2025-07-10 RX ORDER — CYANOCOBALAMIN 1000 UG/ML
1000 INJECTION, SOLUTION INTRAMUSCULAR; SUBCUTANEOUS ONCE
Status: CANCELLED | OUTPATIENT
Start: 2025-07-14

## 2025-07-10 RX ORDER — SODIUM CHLORIDE 9 MG/ML
20 INJECTION, SOLUTION INTRAVENOUS ONCE
Status: CANCELLED | OUTPATIENT
Start: 2025-07-14

## 2025-07-10 NOTE — TELEPHONE ENCOUNTER
Patient calling to return call from Vinicio Oreilly RN, I relayed message and informed patient of possible side effects from Iron infusion, she is aware.  I informed her that she will be contacted once appointments for infusion/injections are made. She thanked me.

## 2025-07-10 NOTE — TELEPHONE ENCOUNTER
Patient has routine OB appointment today. Asking if she will need to have NST done due to advanced maternal age. Informed patient she is not scheduled for NST today and there is no mention of NST in prior OB or MFM notes. Informed patient per MFM note, follow up growth US is recommended for advanced maternal age. Advised she can discuss this further with provider at appointment today. Patient verbalized understanding.

## 2025-07-10 NOTE — RESULT ENCOUNTER NOTE
Called and spoke to patiently directly. Answered patient questions and review infusion policies in response to questions. Patient can only confirm childcare for tomorrow. Patient initially added to [place] infusion 7/14, but moved up to tomorrow due to childcare needs. Sent messages to [facility] IN Basket and [facility] Tech pool. Patient scheduled out but unsure if she can commit to certain dates and will begin looking for help with additional childcare.

## 2025-07-10 NOTE — ASSESSMENT & PLAN NOTE
7/9/2025 B12 84  Patient was advised to start IM B12 injections 3 times weekly for 2 weeks by hematology

## 2025-07-10 NOTE — TELEPHONE ENCOUNTER
Infusion- Please schedule infusion/ injections. Thank you!    Call to patient to inform of below. No answer. Left message to call us back so we can inform of below. When patient calls back, please review. Thanks!    ----- Message from Meghann Caldwell PA-C sent at 7/10/2025  3:37 PM EDT -----  I am covering Dr Wiley in basket. This patient is 35 weeks pregnant, she has severe b12 deficiency and NAZARIO. Needs to begin on IM b12 injections 3x/week for 2 weeks and IV iron asap. I ordered   Feraheme 510mg weekly x 2 since she is in third trimester. Please make pt aware of above and review side effects of IV iron (include but are not limited to IV site reactions, tattooing, hypotension,   arthralgias, headache, nausea, and anaphylaxis.) Both medications are generally safe during 3rd trimester of pregnancy. Please also reach out to infusion room to expedite appt and PA process.   Thank you   ----- Message -----  From: Lab, Background User  Sent: 7/9/2025   6:36 PM EDT  To: Maria Luz Gong

## 2025-07-11 ENCOUNTER — HOSPITAL ENCOUNTER (OUTPATIENT)
Dept: INFUSION CENTER | Facility: CLINIC | Age: 40
End: 2025-07-11
Attending: INTERNAL MEDICINE
Payer: COMMERCIAL

## 2025-07-11 VITALS
RESPIRATION RATE: 16 BRPM | TEMPERATURE: 96.7 F | HEART RATE: 86 BPM | DIASTOLIC BLOOD PRESSURE: 79 MMHG | SYSTOLIC BLOOD PRESSURE: 119 MMHG

## 2025-07-11 DIAGNOSIS — E53.8 VITAMIN B 12 DEFICIENCY: ICD-10-CM

## 2025-07-11 DIAGNOSIS — Z3A.34 34 WEEKS GESTATION OF PREGNANCY: Primary | ICD-10-CM

## 2025-07-11 LAB — PCA AB SER-ACNC: <1 UNITS (ref 0–20)

## 2025-07-11 PROCEDURE — 96365 THER/PROPH/DIAG IV INF INIT: CPT

## 2025-07-11 PROCEDURE — 96372 THER/PROPH/DIAG INJ SC/IM: CPT

## 2025-07-11 RX ORDER — SODIUM CHLORIDE 9 MG/ML
20 INJECTION, SOLUTION INTRAVENOUS ONCE
Status: COMPLETED | OUTPATIENT
Start: 2025-07-11 | End: 2025-07-11

## 2025-07-11 RX ORDER — CYANOCOBALAMIN 1000 UG/ML
1000 INJECTION, SOLUTION INTRAMUSCULAR; SUBCUTANEOUS ONCE
Status: CANCELLED | OUTPATIENT
Start: 2025-07-16

## 2025-07-11 RX ORDER — CYANOCOBALAMIN 1000 UG/ML
1000 INJECTION, SOLUTION INTRAMUSCULAR; SUBCUTANEOUS ONCE
Status: COMPLETED | OUTPATIENT
Start: 2025-07-11 | End: 2025-07-11

## 2025-07-11 RX ORDER — SODIUM CHLORIDE 9 MG/ML
20 INJECTION, SOLUTION INTRAVENOUS ONCE
Status: CANCELLED | OUTPATIENT
Start: 2025-07-18

## 2025-07-11 RX ADMIN — SODIUM CHLORIDE 20 ML/HR: 9 INJECTION, SOLUTION INTRAVENOUS at 10:40

## 2025-07-11 RX ADMIN — CYANOCOBALAMIN 1000 MCG: 1000 INJECTION INTRAMUSCULAR; SUBCUTANEOUS at 10:37

## 2025-07-11 RX ADMIN — FERUMOXYTOL 510 MG: 510 INJECTION INTRAVENOUS at 10:54

## 2025-07-11 NOTE — PROGRESS NOTES
Pt here for IV Feraheme and B12, offering no complaints. PIV established with positive blood return noted. Tolerated entire infusion and B12 L deltoid without complications. PIV flushed and removed. AVS declined. Next appt 7/16 1030am. Walked out in stable condition.

## 2025-07-13 LAB — IF BLOCK AB SER QL RIA: 0.9 AU/ML (ref 0–1.1)

## 2025-07-14 ENCOUNTER — HOSPITAL ENCOUNTER (OUTPATIENT)
Dept: INFUSION CENTER | Facility: CLINIC | Age: 40
End: 2025-07-14
Attending: INTERNAL MEDICINE

## 2025-07-15 NOTE — PROGRESS NOTES
Prenatal Visit  Name: Alexandra Espinosa      : 1985      MRN: [id number]  Encounter Provider: Hoda Woodward PA-C  Encounter Date: 2025   Encounter department: [facility] [place]    :  Assessment & Plan  36 weeks gestation of pregnancy  pap 2/3/25 NILM GC/CT:neg  PN1 Labs: UTD  Blood Type:  A+  MFM Level 1:  MFM Level 2:7/3/25  AFP:  neg  28 Week Labs:UTD  TDap: 25   Flu: 2/3/25  GBS: collected today   Blue Folder: given   Yellow Folder: received  Ped Referral : has  Breast pump: ordered  L&D forms: signed  Delivery consent: completed  Patient desires a bilateral salpingectomy only if she needs a   Orders:    Strep B DNA probe, amplification    Prenatal care, third trimester     No S/S Labor, fetus remains active.  GBS culture was collected at this visit.   Labor: I have reviewed the signs and symptoms of labor with the patient, including contractions q4-5 minutes for greater than 1 hour, vaginal bleeding, leaking fluid and decreased fetal movement.  I have emphasized the continued importance of paying close attention to the baby's movements.  I have instructed the patient to call the office with any of the above symptoms prior to coming to the hospital.   Reminded pt to continue to do perineal massage  RTO in 1 week or sooner if needed     Other iron deficiency anemia     Patient is receiving Venofer infusions    Vitamin B 12 deficiency     2025 B12 level 84  Patient was advised by hematology to start IM B12 injections for 2 weeks    History of gestational diabetes in prior pregnancy, currently pregnant       1 hour GTT WNL  Obesity affecting pregnancy, antepartum, unspecified obesity type     TW lbs    AMA (advanced maternal age) multigravida 35+, third trimester         History of postpartum hemorrhage, currently pregnant     Patient had appointment with hematology oncology 2025        History of Present Illness   History of Present  "Jenn Morris is a 40 y.o.  36w2d here for a routine PNV    Denies unusual vaginal discharge, LOF, VB, or ctx. Reports Fetus is active.     Patient states it \"smells like my water broke\". No itching, no gushes of fluid, no unusual appearing discharge    Objective   LMP 2024 (Exact Date)     Pregravid Weight/BMI: 93.9 kg (207 lb) (BMI 32.41)  Total Weight Gain: 5.715 kg (12 lb 9.6 oz)     Physical Exam  Constitutional:       Appearance: Normal appearance.   HENT:      Head: Normocephalic and atraumatic.     Eyes:      Extraocular Movements: Extraocular movements intact.     Pulmonary:      Effort: Pulmonary effort is normal.     Musculoskeletal:         General: Normal range of motion.     Neurological:      Mental Status: She is alert.     Skin:     General: Skin is warm and dry.     Psychiatric:         Mood and Affect: Mood normal.         Behavior: Behavior normal.       Fetal Heart Rate: 140 , Fundal Height (cm): 36 cm    Hoda Woodward PA-C  "

## 2025-07-16 ENCOUNTER — HOSPITAL ENCOUNTER (OUTPATIENT)
Dept: INFUSION CENTER | Facility: CLINIC | Age: 40
Discharge: HOME | End: 2025-07-16
Attending: INTERNAL MEDICINE
Payer: COMMERCIAL

## 2025-07-16 DIAGNOSIS — Z3A.34 34 WEEKS GESTATION OF PREGNANCY: Primary | ICD-10-CM

## 2025-07-16 DIAGNOSIS — E53.8 VITAMIN B 12 DEFICIENCY: ICD-10-CM

## 2025-07-16 PROCEDURE — 96372 THER/PROPH/DIAG INJ SC/IM: CPT

## 2025-07-16 RX ORDER — CYANOCOBALAMIN 1000 UG/ML
1000 INJECTION, SOLUTION INTRAMUSCULAR; SUBCUTANEOUS ONCE
Status: COMPLETED | OUTPATIENT
Start: 2025-07-16 | End: 2025-07-16

## 2025-07-16 RX ORDER — CYANOCOBALAMIN 1000 UG/ML
1000 INJECTION, SOLUTION INTRAMUSCULAR; SUBCUTANEOUS ONCE
Status: CANCELLED | OUTPATIENT
Start: 2025-07-18

## 2025-07-16 RX ADMIN — CYANOCOBALAMIN 1000 MCG: 1000 INJECTION INTRAMUSCULAR; SUBCUTANEOUS at 10:17

## 2025-07-16 NOTE — ASSESSMENT & PLAN NOTE
pap 2/3/25 NILM GC/CT:neg  PN1 Labs: UTD  Blood Type:  A+  MFM Level 1:  MFM Level 2:7/3/25  AFP:  neg  28 Week Labs:UTD  TDap: 25   Flu: 2/3/25  GBS: collected today   Blue Folder: given   Yellow Folder: received  Ped Referral : has  Breast pump: ordered  L&D forms: signed  Delivery consent: completed  Patient desires a bilateral salpingectomy only if she needs a   Orders:    Strep B DNA probe, amplification

## 2025-07-16 NOTE — PROGRESS NOTES
Patient presents today for B12 injection offering no complaints. Patient tolerated injection well in the right arm.  AVS declined, next appointment 7/18 at 1:30..

## 2025-07-16 NOTE — ASSESSMENT & PLAN NOTE
7/9/2025 B12 level 84  Patient was advised by hematology to start IM B12 injections for 2 weeks

## 2025-07-17 ENCOUNTER — DOCUMENTATION (OUTPATIENT)
Dept: HEMATOLOGY ONCOLOGY | Facility: CLINIC | Age: 40
End: 2025-07-17

## 2025-07-17 ENCOUNTER — ROUTINE PRENATAL (OUTPATIENT)
Dept: OBGYN CLINIC | Facility: CLINIC | Age: 40
End: 2025-07-17
Payer: COMMERCIAL

## 2025-07-17 ENCOUNTER — TELEPHONE (OUTPATIENT)
Dept: HEMATOLOGY ONCOLOGY | Facility: CLINIC | Age: 40
End: 2025-07-17

## 2025-07-17 VITALS
BODY MASS INDEX: 34.58 KG/M2 | DIASTOLIC BLOOD PRESSURE: 84 MMHG | SYSTOLIC BLOOD PRESSURE: 112 MMHG | WEIGHT: 220.8 LBS | HEART RATE: 101 BPM | OXYGEN SATURATION: 99 %

## 2025-07-17 DIAGNOSIS — D50.8 OTHER IRON DEFICIENCY ANEMIA: ICD-10-CM

## 2025-07-17 DIAGNOSIS — O09.523 AMA (ADVANCED MATERNAL AGE) MULTIGRAVIDA 35+, THIRD TRIMESTER: ICD-10-CM

## 2025-07-17 DIAGNOSIS — O09.299 HISTORY OF POSTPARTUM HEMORRHAGE, CURRENTLY PREGNANT: ICD-10-CM

## 2025-07-17 DIAGNOSIS — Z3A.36 36 WEEKS GESTATION OF PREGNANCY: Primary | ICD-10-CM

## 2025-07-17 DIAGNOSIS — O99.210 OBESITY AFFECTING PREGNANCY, ANTEPARTUM, UNSPECIFIED OBESITY TYPE: ICD-10-CM

## 2025-07-17 DIAGNOSIS — Z86.32 HISTORY OF GESTATIONAL DIABETES IN PRIOR PREGNANCY, CURRENTLY PREGNANT: ICD-10-CM

## 2025-07-17 DIAGNOSIS — N89.8 VAGINAL ODOR: ICD-10-CM

## 2025-07-17 DIAGNOSIS — Z34.93 PRENATAL CARE, THIRD TRIMESTER: ICD-10-CM

## 2025-07-17 DIAGNOSIS — O09.299 HISTORY OF GESTATIONAL DIABETES IN PRIOR PREGNANCY, CURRENTLY PREGNANT: ICD-10-CM

## 2025-07-17 DIAGNOSIS — E53.8 VITAMIN B 12 DEFICIENCY: ICD-10-CM

## 2025-07-17 DIAGNOSIS — Z3A.34 34 WEEKS GESTATION OF PREGNANCY: Primary | ICD-10-CM

## 2025-07-17 PROCEDURE — 87150 DNA/RNA AMPLIFIED PROBE: CPT

## 2025-07-17 PROCEDURE — 87480 CANDIDA DNA DIR PROBE: CPT

## 2025-07-17 PROCEDURE — 87660 TRICHOMONAS VAGIN DIR PROBE: CPT

## 2025-07-17 PROCEDURE — 87510 GARDNER VAG DNA DIR PROBE: CPT

## 2025-07-17 PROCEDURE — 87210 SMEAR WET MOUNT SALINE/INK: CPT

## 2025-07-17 PROCEDURE — PNV

## 2025-07-17 RX ORDER — SODIUM CHLORIDE 9 MG/ML
20 INJECTION, SOLUTION INTRAVENOUS ONCE
Status: CANCELLED | OUTPATIENT
Start: 2025-07-18

## 2025-07-17 NOTE — PROGRESS NOTES
Plan needs to change because of authorization purposes.   Plan changed from Feraheme to Venofer 200mg x 5 per Dr Gong

## 2025-07-18 ENCOUNTER — HOSPITAL ENCOUNTER (OUTPATIENT)
Dept: INFUSION CENTER | Facility: CLINIC | Age: 40
End: 2025-07-18
Attending: INTERNAL MEDICINE
Payer: COMMERCIAL

## 2025-07-18 ENCOUNTER — RESULTS FOLLOW-UP (OUTPATIENT)
Dept: OBGYN CLINIC | Facility: CLINIC | Age: 40
End: 2025-07-18

## 2025-07-18 VITALS
SYSTOLIC BLOOD PRESSURE: 120 MMHG | TEMPERATURE: 98.5 F | DIASTOLIC BLOOD PRESSURE: 58 MMHG | HEART RATE: 87 BPM | RESPIRATION RATE: 18 BRPM

## 2025-07-18 DIAGNOSIS — Z3A.34 34 WEEKS GESTATION OF PREGNANCY: Primary | ICD-10-CM

## 2025-07-18 DIAGNOSIS — E53.8 VITAMIN B 12 DEFICIENCY: ICD-10-CM

## 2025-07-18 DIAGNOSIS — B96.89 BV (BACTERIAL VAGINOSIS): Primary | ICD-10-CM

## 2025-07-18 DIAGNOSIS — N76.0 BV (BACTERIAL VAGINOSIS): Primary | ICD-10-CM

## 2025-07-18 LAB
BV WHIFF TEST VAG QL: NEGATIVE
CANDIDA RRNA VAG QL PROBE: NOT DETECTED
CLUE CELLS SPEC QL WET PREP: NEGATIVE
G VAGINALIS RRNA GENITAL QL PROBE: DETECTED
PH SMN: 4.5 [PH]
SL AMB POCT WET MOUNT: NORMAL
T VAGINALIS RRNA GENITAL QL PROBE: NOT DETECTED
T VAGINALIS VAG QL WET PREP: NEGATIVE
YEAST VAG QL WET PREP: NEGATIVE

## 2025-07-18 PROCEDURE — 96372 THER/PROPH/DIAG INJ SC/IM: CPT

## 2025-07-18 PROCEDURE — 96365 THER/PROPH/DIAG IV INF INIT: CPT

## 2025-07-18 RX ORDER — CYANOCOBALAMIN 1000 UG/ML
1000 INJECTION, SOLUTION INTRAMUSCULAR; SUBCUTANEOUS ONCE
Status: COMPLETED | OUTPATIENT
Start: 2025-07-18 | End: 2025-07-18

## 2025-07-18 RX ORDER — CYANOCOBALAMIN 1000 UG/ML
1000 INJECTION, SOLUTION INTRAMUSCULAR; SUBCUTANEOUS ONCE
Status: CANCELLED | OUTPATIENT
Start: 2025-07-23

## 2025-07-18 RX ORDER — SODIUM CHLORIDE 9 MG/ML
20 INJECTION, SOLUTION INTRAVENOUS ONCE
Status: COMPLETED | OUTPATIENT
Start: 2025-07-18 | End: 2025-07-18

## 2025-07-18 RX ORDER — SODIUM CHLORIDE 9 MG/ML
20 INJECTION, SOLUTION INTRAVENOUS ONCE
Status: CANCELLED | OUTPATIENT
Start: 2025-07-25

## 2025-07-18 RX ORDER — METRONIDAZOLE 7.5 MG/G
1 GEL VAGINAL DAILY
Qty: 70 G | Refills: 0 | Status: SHIPPED | OUTPATIENT
Start: 2025-07-18 | End: 2025-07-24 | Stop reason: ALTCHOICE

## 2025-07-18 RX ADMIN — SODIUM CHLORIDE 200 MG: 9 INJECTION, SOLUTION INTRAVENOUS at 13:54

## 2025-07-18 RX ADMIN — SODIUM CHLORIDE 20 ML/HR: 9 INJECTION, SOLUTION INTRAVENOUS at 13:49

## 2025-07-18 RX ADMIN — CYANOCOBALAMIN 1000 MCG: 1000 INJECTION INTRAMUSCULAR; SUBCUTANEOUS at 13:54

## 2025-07-18 NOTE — PROGRESS NOTES
Alexandra Espinosa presents for  IV Venofer Infusion. Patient offers no complaints or antibiotic use. Peripheral IV established with blood return noted. Patient tolerated infusion with no complications. Peripheral IV flushed and removed. AVS declined. Next appointment on 7/23 at 10:30am reviewed with patient. Patient walked out of infusion center in stable condition.

## 2025-07-18 NOTE — TELEPHONE ENCOUNTER
Patient called back, reviewed TRACE Woodward's lab note and recommendations for BV treatment. Discussed how to use gel and to call back if any symptoms after treatment or if any questions or concerns. Patient verbalzied understanding and thankful for follow up.

## 2025-07-21 LAB — GP B STREP DNA SPEC QL NAA+PROBE: NEGATIVE

## 2025-07-21 NOTE — PATIENT INSTRUCTIONS
"Patient Education     Your baby's movement before birth   The Basics   Written by the doctors and editors at [facility]   When should I start feeling my baby move? -- It depends. Most people first feel their baby moving in the uterus between about 16 and 20 weeks of pregnancy. It might take longer to feel movement if this is your first pregnancy or if the placenta is in the front of your uterus.  What kinds of movements should I feel? -- When you first feel your baby move, it might feel like a gentle flutter in your belly. This is sometimes called \"quickening.\" As the baby grows, their movements will get stronger. You will probably feel them kicking, rolling, and stretching. Later in pregnancy, you might be able to see and feel the baby moving from the outside.  You might notice that your baby is more active at certain times of the day or night. Even before birth, babies have periods of being asleep and awake. When your baby is sleeping, you might notice that they do not move as much.  Should I keep track of my baby's movements? -- If your pregnancy is healthy, you probably do not need to count or record your baby's movements. Feeling regular movement is a good sign that the baby is doing well.  In some cases, your doctor or midwife might ask you to keep track of your baby's movements. If so, they will tell you how to do this and when to call them.  A change in your baby's movements does not always mean that there is a problem. But in some cases, it can be a sign that the baby is having trouble. If your doctor or midwife is concerned, they can do tests to check on the baby.  If I am asked to track movement, how should I do it? -- There are different ways of tracking your baby's movement. This is sometimes called \"kick counting.\"  Your doctor or midwife will tell you exactly what to track. For example, they might ask you to write down:   How long it takes to feel 10 kicks or movements   How many times your baby moves " in 1 hour  Many experts consider at least 10 movements in 2 hours to be a sign that the baby is doing well. But there is no specific cutoff for exactly how much movement is healthy or unhealthy. Some babies are more active than others, and some pregnant people feel movement more easily than others. The main goal of kick counting is to get to know your baby's normal patterns so you can tell if anything changes.  If you are doing kick counting:   Choose a time of day when your baby is usually active.   Find a quiet place where you will not be distracted.   Lie down on your side in a comfortable position.   Check the clock, or set a timer.   Each time you feel your baby move or kick, write down the time. Some people use a smartphone magdalena to keep track.   If your baby seems less active than usual, try moving around, eating a snack, and emptying your bladder. This can help wake the baby up if they are asleep.   Stop counting after you have felt 10 kicks, or after the length of time your doctor or midwife told you.  When should I call the doctor? -- Call your doctor or midwife for advice if:   You have concerns about your baby's movement.   Your baby is moving less than they normally do.   You notice a sudden change in the pattern of your baby's movements.   You have any other symptoms that worry you.  All topics are updated as new evidence becomes available and our peer review process is complete.  This topic retrieved from [employer] on: Feb 26, 2024.  Topic 570161 Version 1.0  Release: 32.2.4 - C32.56  © 2024 UpToDate, Inc. and/or its affiliates. All rights reserved.  Consumer Information Use and Disclaimer   Disclaimer: This generalized information is a limited summary of diagnosis, treatment, and/or medication information. It is not meant to be comprehensive and should be used as a tool to help the user understand and/or assess potential diagnostic and treatment options. It does NOT include all information about  conditions, treatments, medications, side effects, or risks that may apply to a specific patient. It is not intended to be medical advice or a substitute for the medical advice, diagnosis, or treatment of a health care provider based on the health care provider's examination and assessment of a patient's specific and unique circumstances. Patients must speak with a health care provider for complete information about their health, medical questions, and treatment options, including any risks or benefits regarding use of medications. This information does not endorse any treatments or medications as safe, effective, or approved for treating a specific patient. UpToDate, Inc. and its affiliates disclaim any warranty or liability relating to this information or the use thereof.The use of this information is governed by the Terms of Use, available at https://www.NSC.com/en/know/clinical-effectiveness-terms. © UpToDate, Inc. and its affiliates and/or licensors. All rights reserved.  Copyright   ©  UpToDate, Inc. and/or its affiliates. All rights reserved.  Thank you for choosing us for your  care today.  If you have any questions about your ultrasound or care, please do not hesitate to contact us or your primary obstetrician.        Some general instructions for your pregnancy are:    Exercise: Aim for 150 minutes per week of regular exercise.  Walking is great!  Nutrition: Choose healthy sources of calcium, iron, and protein.  Avoid ultraprocessed foods and added sugar.  Learn about Preeclampsia: preeclampsia is a common, potentially serious high blood pressure complication in pregnancy.  A blood pressure of 140mmHg (systolic or top number) or 90mmHg (diastolic or bottom number) should be evaluated by your doctor.  Aspirin is sometimes prescribed in early pregnancy to prevent preeclampsia in women with risk factors - ask your obstetrician if you should be on this medication.  For more  resources, visit:  https://www.highriskpregnancyinfo.org/preeclampsia  If you smoke, please try to quit completely but also try to reduce your smoking by as much as possible (as soon as possible).  Do not vape.  Please also avoid cannabis products.  Other warning signs to watch out for in pregnancy or postpartum: chest pain, obstructed breathing or shortness of breath, seizures, thoughts of hurting yourself or your baby, bleeding, a painful or swollen leg, fever, or headache (see [employer] POST-BIRTH Warning Signs campaign).  If these happen call 911.  Itching is also not normal in pregnancy and if you experience this, especially over your hands and feet, potentially worse at night, notify your doctors.

## 2025-07-22 ENCOUNTER — NURSE TRIAGE (OUTPATIENT)
Age: 40
End: 2025-07-22

## 2025-07-22 NOTE — TELEPHONE ENCOUNTER
"REASON FOR CONVERSATION: Contractions    SYMPTOMS: 37w2d EDC 8/10/25 Patient states this is her third baby, she is worried that her labors go very fast. Has been having irreg contractions for a few days, this morning she has been having contractions that are stronger, about 3 in the past hour.Patient states contractions take her breath away and she needs to concentrate through them. Pain is 6/10 w/ rectal pressure  Baby is moving well.   Feet swollen but not unchanged from last few weeks.  She lives an hour away.      OTHER HEALTH INFORMATION: last office visit , patient receives venofer infusion     PROTOCOL DISPOSITION: L&D Ilya     CARE ADVICE PROVIDED: L&D triage Ilya, call back any questions or concerns    PRACTICE FOLLOW-UP: ESC Dr. STROUD and charge RN       Reason for Disposition   MODERATE-SEVERE abdominal pain    Answer Assessment - Initial Assessment Questions  1. ONSET: \"When did the symptoms begin?\"         For last several days she has been having contractions, this AM more intense pain 2. CONTRACTIONS: \"Describe the contractions that you are having.\" (e.g., duration, frequency, regularity, severity)      Strong contractions over the last hour   3. PREGNANCY: \"How many weeks pregnant are you?\"      37 weeks 2 days   4. SHAWN: \"What date are you expecting to deliver?\"      8/10/2025  5. PARITY: \"Have you had a baby before?\" If Yes, ask: \"How long did the labor last?\"       per patient has quick labors after ROM  6. FETAL MOVEMENT: \"Has the baby's movement decreased or changed significantly from normal?\"      Baby is moving well   7. OTHER SYMPTOMS: \"Do you have any other symptoms?\" (e.g., leaking fluid from vagina, vaginal bleeding, fever, hand/facial swelling)      Denies lof, denies bleeding    Protocols used: Pregnancy - Labor-Adult-[place]    "

## 2025-07-23 ENCOUNTER — HOSPITAL ENCOUNTER (OUTPATIENT)
Dept: INFUSION CENTER | Facility: CLINIC | Age: 40
Discharge: HOME | End: 2025-07-23
Attending: INTERNAL MEDICINE
Payer: COMMERCIAL

## 2025-07-23 DIAGNOSIS — E53.8 VITAMIN B 12 DEFICIENCY: ICD-10-CM

## 2025-07-23 DIAGNOSIS — Z3A.34 34 WEEKS GESTATION OF PREGNANCY: Primary | ICD-10-CM

## 2025-07-23 PROCEDURE — 96372 THER/PROPH/DIAG INJ SC/IM: CPT

## 2025-07-23 RX ORDER — CYANOCOBALAMIN 1000 UG/ML
1000 INJECTION, SOLUTION INTRAMUSCULAR; SUBCUTANEOUS ONCE
Status: CANCELLED | OUTPATIENT
Start: 2025-07-25

## 2025-07-23 RX ORDER — CYANOCOBALAMIN 1000 UG/ML
1000 INJECTION, SOLUTION INTRAMUSCULAR; SUBCUTANEOUS ONCE
Status: COMPLETED | OUTPATIENT
Start: 2025-07-23 | End: 2025-07-23

## 2025-07-23 RX ADMIN — CYANOCOBALAMIN 1000 MCG: 1000 INJECTION INTRAMUSCULAR; SUBCUTANEOUS at 09:45

## 2025-07-23 NOTE — PROGRESS NOTES
Patient presents to clinic for B12 injection offering no complaints. Injection given in L deltoid without complications. AVS declined. Aware of next appt on 7/24/25 at 4PM

## 2025-07-23 NOTE — PROGRESS NOTES
Prenatal Visit  Name: Alexandra Espinosa      : 1985      MRN: [id number]  Encounter Provider: Hoda Woodward PA-C  Encounter Date: 2025   Encounter department: [facility]    :  Assessment & Plan  Prenatal care, third trimester     37w4d Doing well, reports fetus remains active.    Cervix Was checked today. /-1.     Reviewed benefits of perineal massage - to be done 1-4 times per week x 5-10 minutes- education in AVS given    3rd trimester precautions reviewed to reports including LOF, VB or s/s labor. Additionally emphasized the continued importance of paying close attention to the baby's movements and call with decrease/change in fetal activity.  Advised the patient is up to her whether or not she would like to be further evaluated at the hospital versus close monitoring at home with evaluation at the hospital if symptoms become persistent.  Patient plans to monitor symptoms at home and will report to the hospital if she has any signs/symptoms of active labor.  Additional education regarding labor symptoms provided after visit summary    Patient would like to sign a induction consent today  Induction of labor:  Discussed indication for induction such as elective (> or equal to 39 weeks), medical/obstetric indications and alternatives which is to await spontaneous labor.  Discussed cervical ripening if cervix is unfavorable with prostaglandin (vaginal misoprostol) or mechanical dilation with insertion of balloon catheter.  Discussed that when electively induced nulliparous or multiparous women are compared with expectantly managed women, there is no evidence that elective induction is associated with increased risk of .  Discussed cervical ripening if cervix is unfavorable with prostaglandin (vaginal misoprostol) or mechanical dilation with insertion of balloon catheter.     37 weeks gestation of pregnancy     pap 2/3/25 NILM GC/CT:neg  PN1  Labs: UTD  Blood Type:  A+  MFM Level 1:  MFM Level 2:7/3/25  AFP:  neg  28 Week Labs:UTD  TDap: 25   Flu: 2/3/25  GBS: neg  Blue Folder: given   Yellow Folder: received  Ped Referral : has  Breast pump: ordered  L&D forms: signed  Delivery consent: completed  IOL signed   Patient desires a bilateral salpingectomy only if she needs a     Obesity affecting pregnancy, antepartum, unspecified obesity type     TW lbs    History of gestational diabetes in prior pregnancy, currently pregnant     1 hr GTT WNL    History of postpartum hemorrhage, currently pregnant     Pt had heme onc appt 27    AMA (advanced maternal age) multigravida 35+, third trimester         Other iron deficiency anemia     Patient received venofer infusions    Vitamin B 12 deficiency     Patient had received B12 injections        Subjective:   History of Present Illness   History of Present Illness     Routine Prenatal Visit (37w4d)    37w4d   Denies unusual vaginal discharge, LOF, VB. Reports Fetus is active.     Patient completed [employer] on Tuesday .  Patient has had contractions on and off since Tuesday (3 days ago) she had 3-4 contractions an hour at 1 point that were intense but they resolved on their own.  Patient states that they feel much more intense compared to [place] contractions.  Patient also noted on Tuesday that her feet started to swell so she checked her blood pressure which was normotensive and that has also improved.  Patient states that 2 nights ago she had another bout of intense contractions but it once again resolved.  Today she is not currently experiencing contractions.  Patient's only concern is that she lives far from the hospital.  Patient is otherwise feeling okay      Pregravid Weight/BMI: 93.9 kg (207 lb) (BMI 32.41)  Current Weight: 100 kg (220 lb 6.4 oz)   Total Weight Gain: 6.078 kg (13 lb 6.4 oz)    Objective   /78   Pulse 82   Wt 100 kg (220 lb 6.4 oz)   LMP 2024 (Exact  Date)   SpO2 98%   BMI 34.52 kg/m²     Fetal Heart Rate: 140 ,    Physical Exam  Constitutional:       Appearance: Normal appearance.   HENT:      Head: Normocephalic and atraumatic.     Eyes:      Extraocular Movements: Extraocular movements intact.     Pulmonary:      Effort: Pulmonary effort is normal.     Musculoskeletal:         General: Normal range of motion.     Neurological:      Mental Status: She is alert.     Skin:     General: Skin is warm and dry.     Psychiatric:         Mood and Affect: Mood normal.         Behavior: Behavior normal.     Hoda Woodward PA-C

## 2025-07-24 ENCOUNTER — ULTRASOUND (OUTPATIENT)
Dept: PERINATAL CARE | Facility: OTHER | Age: 40
End: 2025-07-24
Payer: COMMERCIAL

## 2025-07-24 ENCOUNTER — ANCILLARY PROCEDURE (OUTPATIENT)
Dept: PERINATAL CARE | Facility: OTHER | Age: 40
End: 2025-07-24
Attending: STUDENT IN AN ORGANIZED HEALTH CARE EDUCATION/TRAINING PROGRAM
Payer: COMMERCIAL

## 2025-07-24 ENCOUNTER — ROUTINE PRENATAL (OUTPATIENT)
Dept: OBGYN CLINIC | Facility: CLINIC | Age: 40
End: 2025-07-24

## 2025-07-24 VITALS
HEIGHT: 67 IN | HEART RATE: 81 BPM | BODY MASS INDEX: 34.56 KG/M2 | DIASTOLIC BLOOD PRESSURE: 78 MMHG | WEIGHT: 220.2 LBS | SYSTOLIC BLOOD PRESSURE: 116 MMHG

## 2025-07-24 VITALS
WEIGHT: 220.4 LBS | DIASTOLIC BLOOD PRESSURE: 78 MMHG | OXYGEN SATURATION: 98 % | SYSTOLIC BLOOD PRESSURE: 116 MMHG | BODY MASS INDEX: 34.52 KG/M2 | HEART RATE: 82 BPM

## 2025-07-24 DIAGNOSIS — E53.8 VITAMIN B 12 DEFICIENCY: ICD-10-CM

## 2025-07-24 DIAGNOSIS — Z34.93 PRENATAL CARE, THIRD TRIMESTER: ICD-10-CM

## 2025-07-24 DIAGNOSIS — Z3A.37 37 WEEKS GESTATION OF PREGNANCY: Primary | ICD-10-CM

## 2025-07-24 DIAGNOSIS — O09.299 HISTORY OF GESTATIONAL DIABETES IN PRIOR PREGNANCY, CURRENTLY PREGNANT: ICD-10-CM

## 2025-07-24 DIAGNOSIS — O99.213 MATERNAL OBESITY, ANTEPARTUM, THIRD TRIMESTER: ICD-10-CM

## 2025-07-24 DIAGNOSIS — O09.523 AMA (ADVANCED MATERNAL AGE) MULTIGRAVIDA 35+, THIRD TRIMESTER: ICD-10-CM

## 2025-07-24 DIAGNOSIS — Z86.32 HISTORY OF GESTATIONAL DIABETES IN PRIOR PREGNANCY, CURRENTLY PREGNANT: ICD-10-CM

## 2025-07-24 DIAGNOSIS — E66.811 CLASS 1 OBESITY: ICD-10-CM

## 2025-07-24 DIAGNOSIS — O09.299 HISTORY OF POSTPARTUM HEMORRHAGE, CURRENTLY PREGNANT: ICD-10-CM

## 2025-07-24 DIAGNOSIS — O99.210 OBESITY AFFECTING PREGNANCY, ANTEPARTUM, UNSPECIFIED OBESITY TYPE: ICD-10-CM

## 2025-07-24 DIAGNOSIS — Z3A.37 37 WEEKS GESTATION OF PREGNANCY: ICD-10-CM

## 2025-07-24 DIAGNOSIS — O09.523 ELDERLY MULTIGRAVIDA, THIRD TRIMESTER: ICD-10-CM

## 2025-07-24 DIAGNOSIS — D50.8 OTHER IRON DEFICIENCY ANEMIA: ICD-10-CM

## 2025-07-24 DIAGNOSIS — O36.63X0 LARGE FOR GESTATIONAL AGE FETUS AFFECTING MOTHER, ANTEPARTUM, THIRD TRIMESTER, SINGLE GESTATION: ICD-10-CM

## 2025-07-24 PROCEDURE — NC001 PR NO CHARGE: Performed by: OBSTETRICS & GYNECOLOGY

## 2025-07-24 PROCEDURE — 76816 OB US FOLLOW-UP PER FETUS: CPT | Performed by: OBSTETRICS & GYNECOLOGY

## 2025-07-24 PROCEDURE — PNV

## 2025-07-24 NOTE — ASSESSMENT & PLAN NOTE
pap 2/3/25 NILM GC/CT:neg  PN1 Labs: UTD  Blood Type:  A+  MFM Level 1:  MFM Level 2:7/3/25  AFP:  neg  28 Week Labs:UTD  TDap: 25   Flu: 2/3/25  GBS: neg  Blue Folder: given   Yellow Folder: received  Ped Referral : has  Breast pump: ordered  L&D forms: signed  Delivery consent: completed  IOL signed   Patient desires a bilateral salpingectomy only if she needs a

## 2025-07-24 NOTE — PROGRESS NOTES
"FOLLOW-UP: MATERNAL-FETAL MEDICINE  Name: Alexandra Espinosa      : 1985      MRN: [id number]  Encounter Provider:  US 1 MO  Encounter Date: 2025   Encounter department: [facility]  :  Assessment & Plan  37 weeks gestation of pregnancy         Elderly multigravida, third trimester         Maternal obesity, antepartum, third trimester         Class 1 obesity         Large for gestational age fetus affecting mother, antepartum, third trimester, single gestation         Estimated fetal weight placed at the 94th percentile for gestational age today  Follow-up MFM ultrasound evaluation recommended as clinically indicated    History of Present Illness     Alexandra Espinosa is a 40 y.o. female  at 37w3d who presents for fetal growth assessment ultrasound.  Objective   LMP 2024 (Exact Date)      Patient's last menstrual period was 2024 (exact date).  Estimated Delivery Date: 8/10/2025, by Last Menstrual Period      Please refer to \"Imaging\" for ultrasound report from today's visit.       "

## 2025-07-24 NOTE — LETTER
"2025     Danay Jacobsen DO  [address]  [address]  [place] [address]    Patient: Alexandra Espinosa   YOB: 1985   Date of Visit: 2025       Dear Dr. Danay Jacobsen DO:    Thank you for referring Alexandra Espinosa to me for evaluation. Below are my notes for this consultation.    If you have questions, please do not hesitate to call me. I look forward to following your patient along with you.         Sincerely,        Florentino Brewster MD        CC: No Recipients    Florentino Brewster MD  2025 10:51 AM  Sign when Signing Visit  FOLLOW-UP: MATERNAL-FETAL MEDICINE  Name: Alexandra Espinosa      : 1985      MRN: [id number]  Encounter Provider:  US 1 MO  Encounter Date: 2025   Encounter department: [facility]  :  Assessment & Plan  37 weeks gestation of pregnancy         Elderly multigravida, third trimester         Maternal obesity, antepartum, third trimester         Class 1 obesity         Large for gestational age fetus affecting mother, antepartum, third trimester, single gestation         Estimated fetal weight placed at the 94th percentile for gestational age today  Follow-up MFM ultrasound evaluation recommended as clinically indicated    History of Present Illness    Alexandra Espinosa is a 40 y.o. female  at 37w3d who presents for fetal growth assessment ultrasound.  Objective  LMP 2024 (Exact Date)      Patient's last menstrual period was 2024 (exact date).  Estimated Delivery Date: 8/10/2025, by Last Menstrual Period      Please refer to \"Imaging\" for ultrasound report from today's visit.       "

## 2025-07-25 ENCOUNTER — HOSPITAL ENCOUNTER (OUTPATIENT)
Dept: INFUSION CENTER | Facility: CLINIC | Age: 40
End: 2025-07-25
Attending: INTERNAL MEDICINE
Payer: COMMERCIAL

## 2025-07-25 VITALS
DIASTOLIC BLOOD PRESSURE: 64 MMHG | RESPIRATION RATE: 18 BRPM | HEART RATE: 76 BPM | SYSTOLIC BLOOD PRESSURE: 117 MMHG | TEMPERATURE: 98 F

## 2025-07-25 DIAGNOSIS — E53.8 VITAMIN B 12 DEFICIENCY: ICD-10-CM

## 2025-07-25 DIAGNOSIS — Z3A.34 34 WEEKS GESTATION OF PREGNANCY: Primary | ICD-10-CM

## 2025-07-25 PROCEDURE — 96365 THER/PROPH/DIAG IV INF INIT: CPT

## 2025-07-25 PROCEDURE — 96372 THER/PROPH/DIAG INJ SC/IM: CPT

## 2025-07-25 RX ORDER — SODIUM CHLORIDE 9 MG/ML
20 INJECTION, SOLUTION INTRAVENOUS ONCE
OUTPATIENT
Start: 2025-08-01

## 2025-07-25 RX ORDER — CYANOCOBALAMIN 1000 UG/ML
1000 INJECTION, SOLUTION INTRAMUSCULAR; SUBCUTANEOUS ONCE
Status: COMPLETED | OUTPATIENT
Start: 2025-07-25 | End: 2025-07-25

## 2025-07-25 RX ORDER — CYANOCOBALAMIN 1000 UG/ML
1000 INJECTION, SOLUTION INTRAMUSCULAR; SUBCUTANEOUS ONCE
OUTPATIENT
Start: 2025-07-26

## 2025-07-25 RX ORDER — SODIUM CHLORIDE 9 MG/ML
20 INJECTION, SOLUTION INTRAVENOUS ONCE
Status: COMPLETED | OUTPATIENT
Start: 2025-07-25 | End: 2025-07-25

## 2025-07-25 RX ADMIN — SODIUM CHLORIDE 200 MG: 9 INJECTION, SOLUTION INTRAVENOUS at 15:49

## 2025-07-25 RX ADMIN — SODIUM CHLORIDE 20 ML/HR: 9 INJECTION, SOLUTION INTRAVENOUS at 15:49

## 2025-07-25 RX ADMIN — CYANOCOBALAMIN 1000 MCG: 1000 INJECTION INTRAMUSCULAR; SUBCUTANEOUS at 15:49

## 2025-07-25 NOTE — PROGRESS NOTES
Patient arrives to infusion center for IV Venofer infusion + B-12 injection. Patient offers no complaints today. PIV established, patient tolerating infusion at this time. B-12 administered in right arm. Care handoff given to Mary Zaldivar RN.     Next appointment: 8/1/25 @ 8482

## 2025-07-30 ENCOUNTER — ANESTHESIA EVENT (INPATIENT)
Dept: ANESTHESIOLOGY | Facility: HOSPITAL | Age: 40
End: 2025-07-30
Payer: COMMERCIAL

## 2025-07-30 ENCOUNTER — ROUTINE PRENATAL (OUTPATIENT)
Dept: OBGYN CLINIC | Facility: CLINIC | Age: 40
End: 2025-07-30
Payer: COMMERCIAL

## 2025-07-30 ENCOUNTER — ANESTHESIA (INPATIENT)
Dept: ANESTHESIOLOGY | Facility: HOSPITAL | Age: 40
End: 2025-07-30
Payer: COMMERCIAL

## 2025-07-30 ENCOUNTER — HOSPITAL ENCOUNTER (INPATIENT)
Facility: HOSPITAL | Age: 40
LOS: 3 days | Discharge: HOME | End: 2025-08-02
Attending: OBSTETRICS & GYNECOLOGY | Admitting: OBSTETRICS & GYNECOLOGY
Payer: COMMERCIAL

## 2025-07-30 VITALS — DIASTOLIC BLOOD PRESSURE: 78 MMHG | BODY MASS INDEX: 34.46 KG/M2 | SYSTOLIC BLOOD PRESSURE: 108 MMHG | WEIGHT: 220 LBS

## 2025-07-30 DIAGNOSIS — Z3A.38 38 WEEKS GESTATION OF PREGNANCY: Primary | ICD-10-CM

## 2025-07-30 DIAGNOSIS — O09.523 AMA (ADVANCED MATERNAL AGE) MULTIGRAVIDA 35+, THIRD TRIMESTER: ICD-10-CM

## 2025-07-30 DIAGNOSIS — O09.299 HISTORY OF GESTATIONAL DIABETES IN PRIOR PREGNANCY, CURRENTLY PREGNANT: ICD-10-CM

## 2025-07-30 DIAGNOSIS — Z86.32 HISTORY OF GESTATIONAL DIABETES IN PRIOR PREGNANCY, CURRENTLY PREGNANT: ICD-10-CM

## 2025-07-30 DIAGNOSIS — O09.299 HISTORY OF POSTPARTUM HEMORRHAGE, CURRENTLY PREGNANT: ICD-10-CM

## 2025-07-30 DIAGNOSIS — O99.210 OBESITY AFFECTING PREGNANCY, ANTEPARTUM, UNSPECIFIED OBESITY TYPE: ICD-10-CM

## 2025-07-30 PROBLEM — O24.419 GESTATIONAL DIABETES MELLITUS: Status: ACTIVE | Noted: 2025-07-30

## 2025-07-30 PROCEDURE — 87210 SMEAR WET MOUNT SALINE/INK: CPT

## 2025-07-30 PROCEDURE — PNV

## 2025-07-31 ENCOUNTER — TELEPHONE (OUTPATIENT)
Dept: OBGYN CLINIC | Facility: CLINIC | Age: 40
End: 2025-07-31

## 2025-08-01 ENCOUNTER — HOSPITAL ENCOUNTER (OUTPATIENT)
Dept: INFUSION CENTER | Facility: CLINIC | Age: 40
Discharge: HOME | End: 2025-08-01
Attending: INTERNAL MEDICINE

## 2025-08-01 PROBLEM — M25.552 LEFT HIP PAIN: Status: ACTIVE | Noted: 2025-08-01

## 2025-08-02 PROBLEM — O09.523 AMA (ADVANCED MATERNAL AGE) MULTIGRAVIDA 35+, THIRD TRIMESTER: Status: RESOLVED | Noted: 2025-01-31 | Resolved: 2025-08-02

## 2025-08-02 PROBLEM — O36.63X0 LARGE FOR GESTATIONAL AGE FETUS AFFECTING MOTHER, ANTEPARTUM, THIRD TRIMESTER, SINGLE GESTATION: Status: RESOLVED | Noted: 2025-07-24 | Resolved: 2025-08-02

## 2025-08-04 DIAGNOSIS — Z13.9 ENCOUNTER FOR SCREENING INVOLVING SOCIAL DETERMINANTS OF HEALTH (SDOH): Primary | ICD-10-CM

## 2025-08-05 ENCOUNTER — PATIENT OUTREACH (OUTPATIENT)
Dept: OBGYN CLINIC | Facility: CLINIC | Age: 40
End: 2025-08-05

## 2025-08-06 ENCOUNTER — TELEPHONE (OUTPATIENT)
Dept: OBGYN CLINIC | Facility: CLINIC | Age: 40
End: 2025-08-06

## 2025-08-06 DIAGNOSIS — F41.9 ANXIETY AND DEPRESSION: ICD-10-CM

## 2025-08-06 DIAGNOSIS — F32.A ANXIETY AND DEPRESSION: ICD-10-CM

## 2025-08-06 RX ORDER — SERTRALINE HYDROCHLORIDE 25 MG/1
25 TABLET, FILM COATED ORAL DAILY
Qty: 30 TABLET | Refills: 0 | Status: SHIPPED | OUTPATIENT
Start: 2025-08-06

## 2025-08-07 RX ORDER — SODIUM CHLORIDE 9 MG/ML
20 INJECTION, SOLUTION INTRAVENOUS ONCE
Status: CANCELLED | OUTPATIENT
Start: 2025-08-08

## 2025-08-12 ENCOUNTER — PATIENT OUTREACH (OUTPATIENT)
Dept: OBGYN CLINIC | Facility: CLINIC | Age: 40
End: 2025-08-12

## 2025-08-13 ENCOUNTER — TELEPHONE (OUTPATIENT)
Age: 40
End: 2025-08-13

## 2025-08-13 ENCOUNTER — POSTPARTUM VISIT (OUTPATIENT)
Dept: OBGYN CLINIC | Facility: CLINIC | Age: 40
End: 2025-08-13

## 2025-08-15 ENCOUNTER — HOSPITAL ENCOUNTER (OUTPATIENT)
Dept: INFUSION CENTER | Facility: CLINIC | Age: 40
End: 2025-08-15
Attending: INTERNAL MEDICINE

## 2025-08-19 ENCOUNTER — PATIENT OUTREACH (OUTPATIENT)
Dept: OBGYN CLINIC | Facility: CLINIC | Age: 40
End: 2025-08-19

## 2025-08-22 ENCOUNTER — TELEPHONE (OUTPATIENT)
Dept: HEMATOLOGY ONCOLOGY | Facility: CLINIC | Age: 40
End: 2025-08-22

## 2025-08-27 PROBLEM — O09.523 ELDERLY MULTIGRAVIDA, THIRD TRIMESTER: Status: RESOLVED | Noted: 2025-07-24 | Resolved: 2025-08-27

## 2025-08-27 PROBLEM — Z86.32 HISTORY OF GESTATIONAL DIABETES IN PRIOR PREGNANCY, CURRENTLY PREGNANT: Status: RESOLVED | Noted: 2023-02-27 | Resolved: 2025-08-27

## 2025-08-27 PROBLEM — O99.210 MATERNAL OBESITY AFFECTING PREGNANCY, ANTEPARTUM: Status: RESOLVED | Noted: 2021-05-05 | Resolved: 2025-08-27

## 2025-08-27 PROBLEM — O99.213 MATERNAL OBESITY, ANTEPARTUM, THIRD TRIMESTER: Status: RESOLVED | Noted: 2025-07-24 | Resolved: 2025-08-27

## 2025-08-27 PROBLEM — O24.419 GESTATIONAL DIABETES MELLITUS: Status: RESOLVED | Noted: 2025-07-30 | Resolved: 2025-08-27

## 2025-08-27 PROBLEM — O09.299 HISTORY OF POSTPARTUM HEMORRHAGE, CURRENTLY PREGNANT: Status: RESOLVED | Noted: 2025-01-24 | Resolved: 2025-08-27

## 2025-08-27 PROBLEM — O09.299 HISTORY OF GESTATIONAL DIABETES IN PRIOR PREGNANCY, CURRENTLY PREGNANT: Status: RESOLVED | Noted: 2023-02-27 | Resolved: 2025-08-27
